# Patient Record
Sex: FEMALE | Race: WHITE | Employment: OTHER | URBAN - METROPOLITAN AREA
[De-identification: names, ages, dates, MRNs, and addresses within clinical notes are randomized per-mention and may not be internally consistent; named-entity substitution may affect disease eponyms.]

---

## 2017-05-26 ENCOUNTER — OFFICE VISIT (OUTPATIENT)
Dept: FAMILY MEDICINE | Facility: CLINIC | Age: 27
End: 2017-05-26
Payer: COMMERCIAL

## 2017-05-26 VITALS
HEIGHT: 63 IN | RESPIRATION RATE: 16 BRPM | WEIGHT: 142.25 LBS | OXYGEN SATURATION: 100 % | TEMPERATURE: 98.5 F | SYSTOLIC BLOOD PRESSURE: 119 MMHG | DIASTOLIC BLOOD PRESSURE: 82 MMHG | HEART RATE: 84 BPM | BODY MASS INDEX: 25.2 KG/M2

## 2017-05-26 DIAGNOSIS — H66.001 ACUTE SUPPURATIVE OTITIS MEDIA OF RIGHT EAR WITHOUT SPONTANEOUS RUPTURE OF TYMPANIC MEMBRANE, RECURRENCE NOT SPECIFIED: ICD-10-CM

## 2017-05-26 DIAGNOSIS — Z23 NEED FOR TDAP VACCINATION: ICD-10-CM

## 2017-05-26 DIAGNOSIS — Z00.00 ROUTINE GENERAL MEDICAL EXAMINATION AT A HEALTH CARE FACILITY: ICD-10-CM

## 2017-05-26 DIAGNOSIS — Z13.0 SCREENING, ANEMIA, DEFICIENCY, IRON: Primary | ICD-10-CM

## 2017-05-26 DIAGNOSIS — Z13.1 SCREENING FOR DIABETES MELLITUS: ICD-10-CM

## 2017-05-26 DIAGNOSIS — Z13.6 ENCOUNTER FOR SCREENING FOR CARDIOVASCULAR DISORDERS: ICD-10-CM

## 2017-05-26 DIAGNOSIS — Z71.85 HPV VACCINE COUNSELING: ICD-10-CM

## 2017-05-26 PROCEDURE — 90715 TDAP VACCINE 7 YRS/> IM: CPT | Performed by: FAMILY MEDICINE

## 2017-05-26 PROCEDURE — 99385 PREV VISIT NEW AGE 18-39: CPT | Mod: 25 | Performed by: FAMILY MEDICINE

## 2017-05-26 PROCEDURE — 90471 IMMUNIZATION ADMIN: CPT | Performed by: FAMILY MEDICINE

## 2017-05-26 PROCEDURE — 99212 OFFICE O/P EST SF 10 MIN: CPT | Mod: 25 | Performed by: FAMILY MEDICINE

## 2017-05-26 RX ORDER — AMOXICILLIN 875 MG
875 TABLET ORAL 2 TIMES DAILY
Qty: 20 TABLET | Refills: 0 | Status: SHIPPED | OUTPATIENT
Start: 2017-05-26 | End: 2017-12-05

## 2017-05-26 NOTE — NURSING NOTE
Screening Questionnaire for Adult Immunization    Are you sick today?   No   Do you have allergies to medications, food, a vaccine component or latex?   No   Have you ever had a serious reaction after receiving a vaccination?   No   Do you have a long-term health problem with heart disease, lung disease, asthma, kidney disease, metabolic disease (e.g. diabetes), anemia, or other blood disorder?   No   Do you have cancer, leukemia, HIV/AIDS, or any other immune system problem?   No   In the past 3 months, have you taken medications that affect  your immune system, such as prednisone, other steroids, or anticancer drugs; drugs for the treatment of rheumatoid arthritis, Crohn s disease, or psoriasis; or have you had radiation treatments?   No   Have you had a seizure, or a brain or other nervous system problem?   No   During the past year, have you received a transfusion of blood or blood     products, or been given immune (gamma) globulin or antiviral drug?   No   For women: Are you pregnant or is there a chance you could become        pregnant during the next month?   No   Have you received any vaccinations in the past 4 weeks?   No     Immunization questionnaire answers were all negative.      MNVFC doesn't apply on this patient    Per orders of Dr. Alvin MD, injection of Tdap (Adacel) given by Fátima Carey. Patient instructed to remain in clinic for 20 minutes afterwards, and to report any adverse reaction to me immediately.       Screening performed by Fátima Carey on 5/26/2017 at 12:00 PM.

## 2017-05-26 NOTE — NURSING NOTE
"Chief Complaint   Patient presents with     Physical     Initial /82 (BP Location: Left arm, Patient Position: Chair)  Pulse 84  Temp 98.5  F (36.9  C) (Oral)  Resp 16  Ht 5' 3\" (1.6 m)  Wt 142 lb 4 oz (64.5 kg)  LMP 05/17/2017 (Exact Date)  SpO2 100%  BMI 25.2 kg/m2 Estimated body mass index is 25.2 kg/(m^2) as calculated from the following:    Height as of this encounter: 5' 3\" (1.6 m).    Weight as of this encounter: 142 lb 4 oz (64.5 kg)..  BP completed using cuff size: rachel Alcazar MA   "

## 2017-05-26 NOTE — Clinical Note
Pap smear done on this date: 03/2015 (approximately), by this group: [place], [place]  results were normal.

## 2017-05-26 NOTE — PROGRESS NOTES
SUBJECTIVE:     CC: Ofelia Will is an 27 year old woman who presents for preventive health visit.     Healthy Habits:  Answers for HPI/ROS submitted by the patient on 5/26/2017   Annual Exam:  Getting at least 3 servings of Calcium per day:: NO  Bi-annual eye exam:: NO  Dental care twice a year:: NO  Sleep apnea or symptoms of sleep apnea:: Daytime drowsiness  Diet:: Regular (no restrictions)  Frequency of exercise:: None  Taking medications regularly:: Yes  Medication side effects:: None  Additional concerns today:: YES  PHQ-2 Score: 0    Moved from [place] in 2015 due to husbands job. 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   Mother had breast caner . Diagnosed age 51 . No genetic testing known     No personal breast issues    Brought in shot record, up to date with HPV, needs tdap , per records not sure if has had Hep A , will await official records    Not sure of coverage will check with her insurance before doing fasting lipids, CMP, cbc    CONCERN  Complain of sore throat 1 week , right ear clogged and painful. No fever or chills, no double or blurry vision, no facial pain, runny nose and sneezing positive. 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     Today's PHQ-2 Score:   PHQ-2 (  [employer]) 2017   Q1: Little interest or pleasure in doing things 0   Q2: Feeling down, depressed or hopeless 0   PHQ-2 Score 0   Q1: Little interest or pleasure in doing things Not at all   Q2: Feeling down, depressed or hopeless Not at all   PHQ-2 Score 0       Abuse: Current or Past(Physical, Sexual or Emotional)- No  Do you feel safe in your environment - Yes    Social History   Substance Use Topics     Smoking status: Not on file     Smokeless tobacco: Not on file     Alcohol use Not on file     The patient does not drink >3 drinks per day nor >7 drinks per week.    No results for input(S): CHOL, HDL, LDL, TRIG, CHOLHDLRATIO, NHDL in the last 18372 hours.    Reviewed orders with patient.  Reviewed health maintenance and updated orders accordingly - Yes    Mammo Decision Support:  Mammogram not appropriate for this patient based on age.    Pertinent mammograms are reviewed under the imaging tab.  History of abnormal Pap smear: NO - age 30- 65 PAP every 3 years recommended  Last 3 Pap Results: No results found for: PAP    Reviewed and updated as needed this visit by clinical staff         Reviewed and updated as needed this visit by Provider        Past Medical History:   Diagnosis Date     ASD (atrial septal defect)     s/p surgical closer age 11 to 13 , asymptomatic       Past Surgical History:   Procedure Laterality Date     REPAIR ATRIAL SEPTAL DEFECT      at age 11 to 13      Obstetric History       T0      L0     SAB0   TAB0   Ectopic0   Multiple0   Live Births0           ROS:  C: NEGATIVE for fever, chills, change in weight  I: NEGATIVE for worrisome rashes, moles or lesions  E: NEGATIVE for vision changes or irritation  ENT: as in HPI   R: NEGATIVE for significant cough or SOB  B: NEGATIVE for masses, tenderness or discharge  CV: NEGATIVE for chest pain, palpitations or peripheral edema  GI: NEGATIVE for nausea, abdominal  pain, heartburn, or change in bowel habits  : NEGATIVE for unusual urinary or vaginal symptoms. Periods are regular.  M: NEGATIVE for significant arthralgias or myalgia  N: NEGATIVE for weakness, dizziness or paresthesias  E: NEGATIVE for temperature intolerance, skin/hair changes  H: NEGATIVE for bleeding problems  P: NEGATIVE for changes in mood or affect    Problem list, Medication list, Allergies, and Medical/Social/Surgical histories reviewed in [facility] and updated as appropriate.  Labs reviewed in EPIC  BP Readings from Last 3 Encounters:   05/26/17 119/82    Wt Readings from Last 3 Encounters:   05/26/17 142 lb 4 oz (64.5 kg)                  There is no problem list on file for this patient.    Past Surgical History:   Procedure Laterality Date     REPAIR ATRIAL SEPTAL DEFECT      at age 11 to 13        Social History   Substance Use Topics     Smoking status: Never Smoker     Smokeless tobacco: Never Used     Alcohol use Yes      Comment: 3 beverages on a average week      Family History   Problem Relation Age of Onset     Breast Cancer Mother      CEREBROVASCULAR DISEASE Father      loss of viison one eye      Myocardial Infarction Maternal Grandfather      Myocardial Infarction Paternal Grandfather          Current Outpatient Prescriptions   Medication Sig Dispense Refill     amoxicillin (AMOXIL) 875 MG tablet Take 1 tablet (875 mg) by mouth 2 times daily 20 tablet 0     No Known Allergies  No lab results found.   OBJECTIVE:     There were no vitals taken for this visit.  EXAM:  GENERAL: healthy, alert and no distress  EYES: Eyes grossly normal to inspection, PERRL and conjunctivae and sclerae normal  HENT: normal cephalic/atraumatic, right ear: erythematous, left ear: clear effusion, nose and mouth without ulcers or lesions, oropharynx clear other than clear post nasal drainage, oral mucous membranes moist and sinuses: not tender  NECK: no adenopathy, no asymmetry, masses, or scars and thyroid normal to  palpation  RESP: lungs clear to auscultation - no rales, rhonchi or wheezes  BREAST: normal without masses, tenderness or nipple discharge and no palpable axillary masses or adenopathy  CV: regular rate and rhythm, normal S1 S2, no S3 or S4, no murmur, click or rub, no peripheral edema and peripheral pulses strong  ABDOMEN: soft, non tender, no hepatosplenomegaly, no masses and bowel sounds normal  MS: no gross musculoskeletal defects noted, no edema  SKIN: no suspicious lesions or rashes  NEURO: Normal strength and tone, mentation intact and speech normal  PSYCH: mentation appears normal, affect normal/bright  LYMPH: no cervical or supraclavicular adenopathy    ASSESSMENT/PLAN:     1. Routine general medical examination at a health care facility  New to me and no records to review prior to apt. apt time limited, here for physical and establish care. [place]. Will Sign a release of information so we can get records. Breast exam done benign. Breast exam monthly. mammogram starting age 40 given family history. To ask mom about if had genetic testing. Amoxil twice a day for ear infection for 10 days. Take with food. Use a probiotic / yoghurt while on the med. Pap not due till 2018. Tdap today. HPV utd . Consider hep a in the future. May try Flonase 1 spray each nose daily 1 to 2 times a day along with antihistamine OTC daily 2 weeks to help decongest sinus and help with eustachian tube dysfunction. Can start this prior to flights to see if will help along wit decongestant if needed and chewing on gum on ascent and descent to prevent eustachian tube dysfunction. If planning to get pregnant start pnv 3 months prior to plan to get [pregnant , no history of congenital or neural tube defects. Stop alcohol few weeks before trying to get pregnant. Fasting labs once checks with her insurance.   - CBC with platelets differential; Future  - Comprehensive metabolic panel; Future  - Lipid panel  reflex to direct LDL; Future  - VACCINE ADMINISTRATION, INITIAL  - TDAP VACCINE (ADACEL)    2. Screening, anemia, deficiency, iron  - CBC with platelets differential; Future    3. Screening for diabetes mellitus  - Comprehensive metabolic panel; Future    4. Encounter for screening for cardiovascular disorders  - Lipid panel reflex to direct LDL; Future    5. Need for Tdap vaccination  - VACCINE ADMINISTRATION, INITIAL  - TDAP VACCINE (ADACEL)    6. HPV vaccine counseling  UTD in [place]     7. Acute suppurative otitis media of right ear without spontaneous rupture of tympanic membrane, recurrence not specified  - amoxicillin (AMOXIL) 875 MG tablet; Take 1 tablet (875 mg) by mouth 2 times daily  Dispense: 20 tablet; Refill: 0  - OFFICE/OUTPT VISIT,EST,LEVL II    COUNSELING:   Reviewed preventive health counseling, as reflected in patient instructions       Regular exercise       Healthy diet/nutrition       Immunizations    Vaccinated for: TDAP           Contraception       Family planning       Safe sex practices/STD prevention       Colon cancer screening       Consider Hep C screening for patients born between 1945 and 1965       HIV screeninx in teen years, 1x in adult years, and at intervals if high risk         has no tobacco history on file.    There is no height or weight on file to calculate BMI.       Counseling Resources:  ATP IV Guidelines  Pooled Cohorts Equation Calculator  Breast Cancer Risk Calculator  FRAX Risk Assessment  ICSI Preventive Guidelines  Dietary Guidelines for Americans,   [employer] My Plate  ASA Prophylaxis  Lung CA Screening    Guerda Esquivel MD  [facility]

## 2017-05-26 NOTE — MR AVS SNAPSHOT
After Visit Summary   5/26/2017    Ofelia Will    MRN: [id number]           Patient Information     Date Of Birth          1990        Visit Information        Provider Department      5/26/2017 11:00 AM Guerda Esquivel MD [facility]        Today's Diagnoses     Screening, anemia, deficiency, iron    -  1    Routine general medical examination at a health care facility        Screening for diabetes mellitus        Encounter for screening for cardiovascular disorders        Need for Tdap vaccination        HPV vaccine counseling        Acute suppurative otitis media of right ear without spontaneous rupture of tympanic membrane, recurrence not specified          Care Instructions    Breast exam monthly   mammogram starting age 40 given family history   Ask mom about if had genetic testing   Amoxil twice a day for ear infection for 10 days  Take with food   Use a probiotic / yoghurt while on the med  Pap not due till 2018   Will await records if sign release of information  Tdap today   HPv utd     Preventive Health Recommendations  Female Ages 26 - 39  Yearly exam:   See your health care provider every year in order to    Review health changes.     Discuss preventive care.      Review your medicines if you your doctor has prescribed any.    Until age 30: Get a Pap test every three years (more often if you have had an abnormal result).    After age 30: Talk to your doctor about whether you should have a Pap test every 3 years or have a Pap test with HPV screening every 5 years.   You do not need a Pap test if your uterus was removed (hysterectomy) and you have not had cancer.  You should be tested each year for STDs (sexually transmitted diseases), if you're at risk.   Talk to your provider about how often to have your cholesterol checked.  If you are at risk for diabetes, you should have a diabetes test (fasting glucose).  Shots: Get a flu shot each year. Get a tetanus shot every 10  years.   Nutrition:     Eat at least 5 servings of fruits and vegetables each day.    Eat whole-grain bread, whole-wheat pasta and brown rice instead of white grains and rice.    Talk to your provider about Calcium and Vitamin D.     Lifestyle    Exercise at least 150 minutes a week (30 minutes a day, 5 days of the week). This will help you control your weight and prevent disease.    Limit alcohol to one drink per day.    No smoking.     Wear sunscreen to prevent skin cancer.    See your dentist every six months for an exam and cleaning.            Follow-ups after your visit        Future tests that were ordered for you today     Open Future Orders        Priority Expected Expires Ordered    CBC with platelets differential Routine 6/9/2017 8/26/2017 5/26/2017    Comprehensive metabolic panel Routine 6/9/2017 8/26/2017 5/26/2017    Lipid panel reflex to direct LDL Routine 6/9/2017 8/26/2017 5/26/2017            Who to contact     If you have questions or need follow up information about today's clinic visit or your schedule please contact [facility] directly at [phone].  Normal or non-critical lab and imaging results will be communicated to you by [employer], letter or phone within 4 business days after the clinic has received the results. If you do not hear from us within 7 days, please contact the clinic through [employer] or phone. If you have a critical or abnormal lab result, we will notify you by phone as soon as possible.  Submit refill requests through [employer] or call your pharmacy and they will forward the refill request to us. Please allow 3 business days for your refill to be completed.          Additional Information About Your Visit        [employer]ate Information     [employer] gives you secure access to your electronic health record. If you see a primary care provider, you can also send messages to your care team and make appointments. If you have questions, please call your primary care clinic.  " If you do not have a primary care provider, please call [phone] and they will assist you.        Care EveryWhere ID     This is your Care EveryWhere ID. This could be used by other organizations to access your [facility] medical records  [id number]        Your Vitals Were     Pulse Temperature Respirations Height Last Period Pulse Oximetry    84 98.5  F (36.9  C) (Oral) 16 5' 3\" (1.6 m) 05/17/2017 (Exact Date) 100%    BMI (Body Mass Index)                   25.2 kg/m2            Blood Pressure from Last 3 Encounters:   05/26/17 119/82    Weight from Last 3 Encounters:   05/26/17 142 lb 4 oz (64.5 kg)              We Performed the Following     TDAP VACCINE (ADACEL)     VACCINE ADMINISTRATION, INITIAL          Today's Medication Changes          These changes are accurate as of: 5/26/17 11:47 AM.  If you have any questions, ask your nurse or doctor.               Start taking these medicines.        Dose/Directions    amoxicillin 875 MG tablet   Commonly known as:  AMOXIL   Used for:  Acute suppurative otitis media of right ear without spontaneous rupture of tympanic membrane, recurrence not specified   Started by:  Guerda Esquivel MD        Dose:  875 mg   Take 1 tablet (875 mg) by mouth 2 times daily   Quantity:  20 tablet   Refills:  0            Where to get your medicines      These medications were sent to [facility] [place], [place] - [address]  [address] [facility] [facility] [address]     Phone:  [phone]     amoxicillin 875 MG tablet                Primary Care Provider    None Specified       No primary provider on file.        Thank you!     Thank you for choosing [facility]  for your care. Our goal is always to provide you with excellent care. Hearing back from our patients is one way we can continue to improve our services. Please take a few minutes to complete the written survey that you may receive in the mail after your visit with us. Thank you!           "   Your Updated Medication List - Protect others around you: Learn how to safely use, store and throw away your medicines at www.disposemymeds.org.          This list is accurate as of: 5/26/17 11:47 AM.  Always use your most recent med list.                   Brand Name Dispense Instructions for use    amoxicillin 875 MG tablet    AMOXIL    20 tablet    Take 1 tablet (875 mg) by mouth 2 times daily

## 2017-12-05 ENCOUNTER — OFFICE VISIT (OUTPATIENT)
Dept: FAMILY MEDICINE | Facility: CLINIC | Age: 27
End: 2017-12-05
Payer: COMMERCIAL

## 2017-12-05 VITALS
SYSTOLIC BLOOD PRESSURE: 128 MMHG | TEMPERATURE: 98.7 F | HEART RATE: 101 BPM | BODY MASS INDEX: 25.51 KG/M2 | OXYGEN SATURATION: 98 % | RESPIRATION RATE: 18 BRPM | WEIGHT: 144 LBS | DIASTOLIC BLOOD PRESSURE: 81 MMHG

## 2017-12-05 DIAGNOSIS — Z86.59 HISTORY OF DEPRESSION: ICD-10-CM

## 2017-12-05 DIAGNOSIS — R53.83 OTHER FATIGUE: ICD-10-CM

## 2017-12-05 DIAGNOSIS — Z86.2 HISTORY OF ANEMIA: ICD-10-CM

## 2017-12-05 DIAGNOSIS — R40.0 DAYTIME SLEEPINESS: ICD-10-CM

## 2017-12-05 DIAGNOSIS — G47.00 INSOMNIA, UNSPECIFIED TYPE: Primary | ICD-10-CM

## 2017-12-05 DIAGNOSIS — Z23 NEED FOR HEPATITIS A IMMUNIZATION: ICD-10-CM

## 2017-12-05 DIAGNOSIS — Z23 NEED FOR PROPHYLACTIC VACCINATION AND INOCULATION AGAINST INFLUENZA: ICD-10-CM

## 2017-12-05 DIAGNOSIS — F43.9 STRESS: ICD-10-CM

## 2017-12-05 DIAGNOSIS — Z86.39 HISTORY OF VITAMIN D DEFICIENCY: ICD-10-CM

## 2017-12-05 DIAGNOSIS — Z13.6 ENCOUNTER FOR SCREENING FOR CARDIOVASCULAR DISORDERS: ICD-10-CM

## 2017-12-05 LAB
BASOPHILS # BLD AUTO: 0 10E9/L (ref 0–0.2)
BASOPHILS NFR BLD AUTO: 0.5 %
DIFFERENTIAL METHOD BLD: NORMAL
EOSINOPHIL # BLD AUTO: 0.1 10E9/L (ref 0–0.7)
EOSINOPHIL NFR BLD AUTO: 0.9 %
ERYTHROCYTE [DISTWIDTH] IN BLOOD BY AUTOMATED COUNT: 11.9 % (ref 10–15)
HCT VFR BLD AUTO: 42.4 % (ref 35–47)
HGB BLD-MCNC: 14.7 G/DL (ref 11.7–15.7)
LYMPHOCYTES # BLD AUTO: 2.2 10E9/L (ref 0.8–5.3)
LYMPHOCYTES NFR BLD AUTO: 27.8 %
MCH RBC QN AUTO: 30.5 PG (ref 26.5–33)
MCHC RBC AUTO-ENTMCNC: 34.7 G/DL (ref 31.5–36.5)
MCV RBC AUTO: 88 FL (ref 78–100)
MONOCYTES # BLD AUTO: 0.5 10E9/L (ref 0–1.3)
MONOCYTES NFR BLD AUTO: 6.3 %
NEUTROPHILS # BLD AUTO: 5.2 10E9/L (ref 1.6–8.3)
NEUTROPHILS NFR BLD AUTO: 64.5 %
PLATELET # BLD AUTO: 316 10E9/L (ref 150–450)
RBC # BLD AUTO: 4.82 10E12/L (ref 3.8–5.2)
WBC # BLD AUTO: 8 10E9/L (ref 4–11)

## 2017-12-05 PROCEDURE — 99214 OFFICE O/P EST MOD 30 MIN: CPT | Mod: 25 | Performed by: FAMILY MEDICINE

## 2017-12-05 PROCEDURE — 36415 COLL VENOUS BLD VENIPUNCTURE: CPT | Performed by: FAMILY MEDICINE

## 2017-12-05 PROCEDURE — 80061 LIPID PANEL: CPT | Performed by: FAMILY MEDICINE

## 2017-12-05 PROCEDURE — 82306 VITAMIN D 25 HYDROXY: CPT | Performed by: FAMILY MEDICINE

## 2017-12-05 PROCEDURE — 90686 IIV4 VACC NO PRSV 0.5 ML IM: CPT | Performed by: FAMILY MEDICINE

## 2017-12-05 PROCEDURE — 90471 IMMUNIZATION ADMIN: CPT | Performed by: FAMILY MEDICINE

## 2017-12-05 PROCEDURE — 80050 GENERAL HEALTH PANEL: CPT | Performed by: FAMILY MEDICINE

## 2017-12-05 RX ORDER — TRAZODONE HYDROCHLORIDE 50 MG/1
50 TABLET, FILM COATED ORAL
Qty: 30 TABLET | Refills: 1 | Status: CANCELLED | OUTPATIENT
Start: 2017-12-05

## 2017-12-05 RX ORDER — LANOLIN ALCOHOL/MO/W.PET/CERES
3 CREAM (GRAM) TOPICAL
Qty: 30 TABLET | Refills: 0 | Status: SHIPPED | OUTPATIENT
Start: 2017-12-05 | End: 2018-03-01

## 2017-12-05 ASSESSMENT — ANXIETY QUESTIONNAIRES
7. FEELING AFRAID AS IF SOMETHING AWFUL MIGHT HAPPEN: NOT AT ALL
5. BEING SO RESTLESS THAT IT IS HARD TO SIT STILL: NOT AT ALL
GAD7 TOTAL SCORE: 4
2. NOT BEING ABLE TO STOP OR CONTROL WORRYING: SEVERAL DAYS
6. BECOMING EASILY ANNOYED OR IRRITABLE: NOT AT ALL
1. FEELING NERVOUS, ANXIOUS, OR ON EDGE: SEVERAL DAYS
IF YOU CHECKED OFF ANY PROBLEMS ON THIS QUESTIONNAIRE, HOW DIFFICULT HAVE THESE PROBLEMS MADE IT FOR YOU TO DO YOUR WORK, TAKE CARE OF THINGS AT HOME, OR GET ALONG WITH OTHER PEOPLE: SOMEWHAT DIFFICULT
3. WORRYING TOO MUCH ABOUT DIFFERENT THINGS: SEVERAL DAYS

## 2017-12-05 ASSESSMENT — PATIENT HEALTH QUESTIONNAIRE - PHQ9
SUM OF ALL RESPONSES TO PHQ QUESTIONS 1-9: 4
5. POOR APPETITE OR OVEREATING: SEVERAL DAYS

## 2017-12-05 NOTE — PROGRESS NOTES
Ilana Ms. Will,  Your results came back and are within acceptable limits. -Normal red blood cell (hgb) levels, normal white blood cell count and normal platelet levels.. If you have any further concerns please do not hesitate to contact us by message, phone or making an appointment.  Have a good day   Dr Alvin HOLDEN

## 2017-12-05 NOTE — MR AVS SNAPSHOT
After Visit Summary   12/5/2017    Ofelia Will    MRN: [id number]           Patient Information     Date Of Birth          1990        Visit Information        Provider Department      12/5/2017 1:20 PM Guerda Esquivel MD [facility]        Today's Diagnoses     Insomnia, unspecified type    -  1    Other fatigue        Stress        Daytime sleepiness        History of depression        History of anemia        History of vitamin D deficiency        Need for prophylactic vaccination and inoculation against influenza        Need for hepatitis A immunization        Encounter for screening for cardiovascular disorders          Care Instructions    Labs today   Try melatonin 3 mg bedtimes, start with 1/2 tab 1 hour prior to sleep   If that patel snot work call about trazodone  If no help see sleep specialist  Stress can do this too so consider counseling   Flu shot today   Pap due 2018  mammogram age 41   Consider hep a shots     General recommendations for sleep problems (Insomnia)  Allow 2-4 weeks to see results     Establish a regular sleep schedule   Go to bed at same time each night, get up at same time each day, avoid sleeping-in on weekends.  Cut down time in bed (if not asleep, get up, go in other room, do something calming and boring such as sorting papers, making warm milk, knitting, dusting)   Avoid trying to force yourself to sleep.  Use your bed only for sleep. Do not read or watch Television in bed.     Make the bedroom comfortable  Keepthe temperature in your bedroom comfortable, keep bedroom quiet when sleeping, and consider ear plugs (silicon) especially if you have partner who snores.  Keep bedroom dark enough:  use dark blinds or wear an eye mask if needed.    Relax at bedtime.    Do not watch tv or play video games or surf on computer right before bed; the full spectrum light actually stimulates your brain!  Relax each muscle group individually ; begin with your  feet, work toward your head. Deal with your worries before bedtime by setting aside a worry time for 30 minutes earlier or journal your thoughts.  Listen to relaxation tapes such as Classical Music or Nature sounds or imagine a tranquil scene (e.g. waterfall or beach)   Back Massage : Gentle 5-minute back rub prior to bedtime can help relax you.    Perform measures to make you tired at bedtime.   Get regular Exercise each day (at least 6 hours before bedtime)   Take medications only as directed   Eat a light bedtime snack or warm drink, such as milk or herbal tea (non-caffeinated)   No Napping during day     Stimulus Control   Do not lie awake for more than 30 minutes.   Get out of bed and perform a quiet activity, then return to bed when sleepy.  Repeat as many times per night as needed     Things to avoid   Do not Exercise just before bedtime   No overstimulating activities just before bed, such as competitive games or exciting Television programs  Avoid caffeine (coffee, tea, soda), chocolate after lunchtime   Do not use alcohol to induce sleep (worsens Insomnia)   Do not take someone else's sleeping pills   Do not look at the clock when awakening   Do not turn on light when getting up to use bathroom     Instructions for treating Delayed Sleep Phase Syndrome:    Delayed Sleep Phase Syndrome (DSPS) means that your body's internal timing is set late compared to the 24 hour day. Therefore, it is often difficult to get up on time for work in the morning and sometimes difficult to fall asleep on time, in order to get enough sleep. People with DSPS often tend to like to stay up late on weekends and sleep in until between 10 AM and noon, sometimes even later.This is actually a bad habit that will perpetuate the problem. It reinforces your body's tendency to be on that later schedule.    You should go to bed when you are sleepy and ready to sleep. During this entire process, you should not engage in activities that may  make it worse, such as watching TV in bed, leaving the TV on all night, drinking any caffeine 6 hours before bed or exercising 1-2 hours before bed.     Start taking Melatonin, 1 mg tablet 3-5 hours before the time that you fall asleep on average (not your desired bedtime or time that you get in bed, but the time you normally fall asleep on your own).      Upon awakening, get exposure to sun-light for about 30-45 minutes. You do not need to look at the sun, in fact, this is dangerous. Reading the paper with the sun shining on you is adequate.  Alternatively, you may use a Seasonal Affective Disorder Lamp (intensity 10,000 Lux) instead of the sun. The lamp should be positioned 1-2 arms lengths away from you. They lamps are sold at [employer] such as [employer] or [employer]. A prescription can be written to get insurance coverage in some cases. They are also sold on [employer].com.    Using the light and melatonin should help march your internal clock (known as your circadian rhythms) gradually earlier. As your bedtime advances, remember to take your melatonin earlier, keeping it 5 hours before your fall asleep time.    Avoid naps and sleeping in because sleeping during the day will delay your body's clock and you will have to start from scratch.     More information about light therapy:  If the cost of any light box is too much, you can also purchase a compact fluorescent all spectrum light bulb at a local hardware store for about $8.  The most commonly available bulb is 1400 lumen.  You would need two of these positioned within 1 meter of yourself to be equivalent to 2,500 lux.  The bulbs can be placed in a standard light fixture.  Additionally, they can be placed in a mountable fixture that is used in greenhouses.  Mountable fixtures are also available at hardware stores for about $9.  Do not look directly at the light.  If you have any concerns regarding the safety of bright light therapy  for you, it is recommended that you consult an ophthalmologist before using a light box.  If you have a condition that makes your eyes very sensitive to light, macular degeneration, a family history of such problems, or diabetic changes to your eyes, consult an ophthalmologist before using a light box. If you have anxiety disorder and have an increase in anxiety discontinue use.  Make a relaxing routine prior to bedtime  Relaxation exercises:              Progressive muscle relaxation: Relax each muscle group individually                Begin with your feet, flex, then relax. Try to imagine your feet feeling heavy and sinking into the bed. Move to your calves, do the same thing. Work through each muscle group toward your head.              Relaxing Mental Imagery: Try to imagine a trip that you took and found relaxing, or imagine a day at the beach. Try to walk yourself through the day in your mind as if you were dreaming it. Try to imagine sensing the different experiences, such as feeling sand between your toes, the heat of the sun on your skin, seeing the waves crashing the shore, the smell of the salt water, etc.     Deal with your worries before bedtime                Set aside a worry time around dinner time for 10-15 minutes. Write down the                things that are on your mind. Plan time in the coming days to address those                issues. Brainstorm ideas on how you will deal with them. Try to identify issues                that are out of your control, and try to let those issues go.  Listen to relaxation tapes    Classical Music or Nature sounds    Back Massage    Get regular exercise each day (at least 1-2 hours before bedtime)    Take medications only as directed    Eat a light bedtime snack or warm drink    Warm milk    Warm herbal tea (non-caffeinated)       Things to avoid    No overstimulating activities just before bed    No competitive games before bedtime    No exciting television  programs before bedtime    Avoid caffeine after lunchtime    Avoid chocolate    Do not use alcohol to induce sleep (worsens Insomnia)    Do not take someone else's sleeping pills    Do not look at the clock when awakening    Do not turn on light when getting up to use bathroom, use a nightlight     Online Programs     www.SHUTi.me (pronounced shut eye). There is a fee for this program. Enter the code  [place]  if you decide to enroll in this program.        www.sleepIO.com (pronounced sleep ee oh). There is a fee for this program. Enter the code  [place]  if you decide to enroll in this program.    Suggested Resources  Insomnia Treatment Books      Overcoming Insomnia by Jean Marie Garay and Wendie Oliver (2008)    No More Sleepless Nights by Oziel Ortiz and Ching Zaragoza (1996)    Say Debbie to Insomnia by David Padgett (2009)    The Insomnia Workbook by Adri Lima and Harish Davis (2009)    The Insomnia Answer by Alvin Stephenson and German Santana (2006)      Stress Management and Relaxation Books    The Relaxation and Stress Reduction Workbook by Desirae Mendez, Nery Angeles and Jeremiah Feldman (2008)    Stress Management Workbook: Techniques and Self-Assessment Procedures by Kat Boo and Desmond Palencia (1997)    A Mindfulness-Based Stress Reduction Workbook by Andreas Asher and Charlene Ortiz (2010)    The Complete Stress Management Workbook by Cas Kelly and Aric Harvey (1996)    Assert Yourself by Denice Dawson and Benjamin Dawson (1977)    Relaxation Resources for Computer Download   These websites offer resources to help you relax. This list is for information only. [facility] is not responsible for the quality of services or the actions of any person or organization.  Progressive Muscle Relaxation (PMR):       http://www.[employer]/progressive-muscle-relaxation-exercise.html    http://studentsupport.[facility]/counseling/resources/self-help/relaxation-and-stress-management/  Deep Breathing Exercises:    http://www.[employer]/breathing-awareness.html    Meditation:         wwwMedgenome Labs    www.[employer] InnovationsguidedUSEUMmeditationUSEUMsite.[employer] You may have to pay for some of these resources.    Guided Imagery:    http://www.[employer]/guided-imagery-scripts.html      http://[employer]/library/kedoblgpnu-cxfddo-ayzwhsf/    Counseling / Behavioral Health  [facility]  Visit [email or web address] or call [phone] to find a clinic close to you.  Or call [phone] for [facility].              Follow-ups after your visit        Additional Services     MENTAL HEALTH REFERRAL  - Adult; Assessments and Testing; General Psychological Testing; [facility]: [facility] [phone]; We will contact you to schedule the appointment or please call with any questions       All scheduling is subject to the client's specific insurance plan & benefits, provider/location availability, and provider clinical specialities.  Please arrive 15 minutes early for your first appointment and bring your completed paperwork. THERAPY     Please be aware that coverage of these services is subject to the terms and limitations of your health insurance plan.  Call member services at your health plan with any benefit or coverage questions.            SLEEP EVALUATION & MANAGEMENT REFERRAL - ADULT -[facility] [phone]  (Age 18 and up)       Please be aware that coverage of these services is subject to the terms and limitations of your health insurance plan.  Call member services at your health plan with any benefit or coverage questions.      Please bring the following to your appointment:    >>   List of current medications   >>    This referral request   >>   Any documents/labs given to you for this referral                      Future tests that were ordered for you today     Open Future Orders        Priority Expected Expires Ordered    SLEEP EVALUATION & MANAGEMENT REFERRAL - ADULT -[facility] - [place] [phone]  (Age 18 and up) Routine  12/5/2018 12/5/2017            Who to contact     If you have questions or need follow up information about today's clinic visit or your schedule please contact [facility] directly at [phone].  Normal or non-critical lab and imaging results will be communicated to you by Drive YOYOhart, letter or phone within 4 business days after the clinic has received the results. If you do not hear from us within 7 days, please contact the clinic through [employer] or phone. If you have a critical or abnormal lab result, we will notify you by phone as soon as possible.  Submit refill requests through [employer]-Squared or call your pharmacy and they will forward the refill request to us. Please allow 3 business days for your refill to be completed.          Additional Information About Your Visit        Drive YOYOhart Information     [employer]ared gives you secure access to your electronic health record. If you see a primary care provider, you can also send messages to your care team and make appointments. If you have questions, please call your primary care clinic.  If you do not have a primary care provider, please call [phone] and they will assist you.        Care EveryWhere ID     This is your Care EveryWhere ID. This could be used by other organizations to access your [place] medical records  [id number]        Your Vitals Were     Pulse Temperature Respirations Pulse Oximetry BMI (Body Mass Index)       101 98.7  F (37.1  C) (Oral) 18 98% 25.51 kg/m2        Blood Pressure from Last 3 Encounters:   12/05/17 128/81   05/26/17 119/82    Weight from Last 3 Encounters:   12/05/17 144 lb (65.3 kg)   05/26/17  142 lb 4 oz (64.5 kg)              We Performed the Following     CBC with platelets differential     Comprehensive metabolic panel     HC FLU VAC PRESRV FREE QUAD SPLIT VIR 3+YRS IM     Lipid panel reflex to direct LDL Fasting     MENTAL HEALTH REFERRAL  - Adult; Assessments and Testing; General Psychological Testing; [facility] [phone]; We will contact you to schedule the appointment or please call with any questions     TSH     VACCINE ADMINISTRATION, INITIAL     Vitamin D deficiency screening          Today's Medication Changes          These changes are accurate as of: 12/5/17  2:05 PM.  If you have any questions, ask your nurse or doctor.               Start taking these medicines.        Dose/Directions    melatonin 3 MG tablet   Used for:  Insomnia, unspecified type   Started by:  Guerda Esquivel MD        Dose:  3 mg   Take 1 tablet (3 mg) by mouth nightly as needed for sleep   Quantity:  30 tablet   Refills:  0            Where to get your medicines      [facility] [address] [place] [address] AT [address] [address]     Phone:  [phone]     melatonin 3 MG tablet                Primary Care Provider Office Phone # Fax #    Guerda Esquivel [phone] [address] [facility] [address]        Equal Access to Services     MAHIN STEARNS AH: Frida vasqueso Sokg, waaxda luqadaha, qaybta kaalmada adeegyada, viki gonzalez. So [facility] [phone].    ATENCIÓN: Si habla español, tiene a ferris disposición servicios gratuitos de asistencia lingüística. Llame al [phone].    We comply with applicable federal civil rights laws and [place] laws. We do not discriminate on the basis of race, color, national origin, age, disability, sex, sexual orientation, or gender identity.            Thank you!     Thank you for choosing  [facility]  for your care. Our goal is always to provide you with excellent care. Hearing back from our patients is one way we can continue to improve our services. Please take a few minutes to complete the written survey that you may receive in the mail after your visit with us. Thank you!             Your Updated Medication List - Protect others around you: Learn how to safely use, store and throw away your medicines at www.disposemymeds.org.          This list is accurate as of: 12/5/17  2:05 PM.  Always use your most recent med list.                   Brand Name Dispense Instructions for use Diagnosis    melatonin 3 MG tablet     30 tablet    Take 1 tablet (3 mg) by mouth nightly as needed for sleep    Insomnia, unspecified type

## 2017-12-05 NOTE — NURSING NOTE
Ofelia Will      1.  Has the patient received the information for the influenza vaccine? YES    2.  Does the patient have any of the following contraindications?     Allergy to eggs? No     Allergic reaction to previous influenza vaccines? No     Any other problems to previous influenza vaccines? No     Paralyzed by Guillain-[place] syndrome? No     Currently pregnant? NO     Current moderate or severe illness? No     Allergy to contact lens solution? No    3.  The vaccine has been administered in the usual fashion and the patient was instructed to wait 20 minutes before leaving the building in the event of an allergic reaction: YES    Vaccination given by Lilia Cisse MA  .  Recorded by Lilia Cisse

## 2017-12-05 NOTE — PATIENT INSTRUCTIONS
Labs today   Try melatonin 3 mg bedtimes, start with 1/2 tab 1 hour prior to sleep   If that patel snot work call about trazodone  If no help see sleep specialist  Stress can do this too so consider counseling   Flu shot today   Pap due 2018  mammogram age 41   Consider hep a shots     General recommendations for sleep problems (Insomnia)  Allow 2-4 weeks to see results     Establish a regular sleep schedule   Go to bed at same time each night, get up at same time each day, avoid sleeping-in on weekends.  Cut down time in bed (if not asleep, get up, go in other room, do something calming and boring such as sorting papers, making warm milk, knitting, dusting)   Avoid trying to force yourself to sleep.  Use your bed only for sleep. Do not read or watch Television in bed.     Make the bedroom comfortable  Keepthe temperature in your bedroom comfortable, keep bedroom quiet when sleeping, and consider ear plugs (silicon) especially if you have partner who snores.  Keep bedroom dark enough:  use dark blinds or wear an eye mask if needed.    Relax at bedtime.    Do not watch tv or play video games or surf on computer right before bed; the full spectrum light actually stimulates your brain!  Relax each muscle group individually ; begin with your feet, work toward your head. Deal with your worries before bedtime by setting aside a worry time for 30 minutes earlier or journal your thoughts.  Listen to relaxation tapes such as Classical Music or Nature sounds or imagine a tranquil scene (e.g. waterfall or beach)   Back Massage : Gentle 5-minute back rub prior to bedtime can help relax you.    Perform measures to make you tired at bedtime.   Get regular Exercise each day (at least 6 hours before bedtime)   Take medications only as directed   Eat a light bedtime snack or warm drink, such as milk or herbal tea (non-caffeinated)   No Napping during day     Stimulus Control   Do not lie awake for more than 30 minutes.   Get out of  bed and perform a quiet activity, then return to bed when sleepy.  Repeat as many times per night as needed     Things to avoid   Do not Exercise just before bedtime   No overstimulating activities just before bed, such as competitive games or exciting Television programs  Avoid caffeine (coffee, tea, soda), chocolate after lunchtime   Do not use alcohol to induce sleep (worsens Insomnia)   Do not take someone else's sleeping pills   Do not look at the clock when awakening   Do not turn on light when getting up to use bathroom     Instructions for treating Delayed Sleep Phase Syndrome:    Delayed Sleep Phase Syndrome (DSPS) means that your body's internal timing is set late compared to the 24 hour day. Therefore, it is often difficult to get up on time for work in the morning and sometimes difficult to fall asleep on time, in order to get enough sleep. People with DSPS often tend to like to stay up late on weekends and sleep in until between 10 AM and noon, sometimes even later.This is actually a bad habit that will perpetuate the problem. It reinforces your body's tendency to be on that later schedule.    You should go to bed when you are sleepy and ready to sleep. During this entire process, you should not engage in activities that may make it worse, such as watching TV in bed, leaving the TV on all night, drinking any caffeine 6 hours before bed or exercising 1-2 hours before bed.     Start taking Melatonin, 1 mg tablet 3-5 hours before the time that you fall asleep on average (not your desired bedtime or time that you get in bed, but the time you normally fall asleep on your own).      Upon awakening, get exposure to sun-light for about 30-45 minutes. You do not need to look at the sun, in fact, this is dangerous. Reading the paper with the sun shining on you is adequate.  Alternatively, you may use a Seasonal Affective Disorder Lamp (intensity 10,000 Lux) instead of the sun. The lamp should be positioned 1-2  arms lengths away from you. They lamps are sold at Home [employer] Companies such as [employer], [employer] or [employer]. A prescription can be written to get insurance coverage in some cases. They are also sold on [employer].com.    Using the light and melatonin should help march your internal clock (known as your circadian rhythms) gradually earlier. As your bedtime advances, remember to take your melatonin earlier, keeping it 5 hours before your fall asleep time.    Avoid naps and sleeping in because sleeping during the day will delay your body's clock and you will have to start from scratch.     More information about light therapy:  If the cost of any light box is too much, you can also purchase a compact fluorescent all spectrum light bulb at a local hardware store for about $8.  The most commonly available bulb is 1400 lumen.  You would need two of these positioned within 1 meter of yourself to be equivalent to 2,500 lux.  The bulbs can be placed in a standard light fixture.  Additionally, they can be placed in a mountable fixture that is used in greenhouses.  Mountable fixtures are also available at hardware stores for about $9.  Do not look directly at the light.  If you have any concerns regarding the safety of bright light therapy for you, it is recommended that you consult an ophthalmologist before using a light box.  If you have a condition that makes your eyes very sensitive to light, macular degeneration, a family history of such problems, or diabetic changes to your eyes, consult an ophthalmologist before using a light box. If you have anxiety disorder and have an increase in anxiety discontinue use.  Make a relaxing routine prior to bedtime  Relaxation exercises:              Progressive muscle relaxation: Relax each muscle group individually                Begin with your feet, flex, then relax. Try to imagine your feet feeling heavy and sinking into the bed. Move to your calves, do the same thing.  Work through each muscle group toward your head.              Relaxing Mental Imagery: Try to imagine a trip that you took and found relaxing, or imagine a day at the beach. Try to walk yourself through the day in your mind as if you were dreaming it. Try to imagine sensing the different experiences, such as feeling sand between your toes, the heat of the sun on your skin, seeing the waves crashing the shore, the smell of the salt water, etc.     Deal with your worries before bedtime                Set aside a worry time around dinner time for 10-15 minutes. Write down the                things that are on your mind. Plan time in the coming days to address those                issues. Brainstorm ideas on how you will deal with them. Try to identify issues                that are out of your control, and try to let those issues go.  Listen to relaxation tapes    Classical Music or Nature sounds    Back Massage    Get regular exercise each day (at least 1-2 hours before bedtime)    Take medications only as directed    Eat a light bedtime snack or warm drink    Warm milk    Warm herbal tea (non-caffeinated)       Things to avoid    No overstimulating activities just before bed    No competitive games before bedtime    No exciting television programs before bedtime    Avoid caffeine after lunchtime    Avoid chocolate    Do not use alcohol to induce sleep (worsens Insomnia)    Do not take someone else's sleeping pills    Do not look at the clock when awakening    Do not turn on light when getting up to use bathroom, use a nightlight     Online Programs     www.[employer].me (pronounced shut eye). There is a fee for this program. Enter the code  Rusk  if you decide to enroll in this program.        www.sleepIO.com (pronounced sleep ee oh). There is a fee for this program. Enter the code  Rusk  if you decide to enroll in this program.    Suggested Resources  Insomnia Treatment Books      Overcoming Insomnia by Jean Marie CHA  Jarrod and Wendie Oliver (2008)    No More Sleepless Nights by Oziel Ortiz and Ching Zaragoza (1996)    Say Debbie to Insomnia by David Padgett (2009)    The Insomnia Workbook by Adri Lima and Harish Davis (2009)    The Insomnia Answer by Alvin Stephenson and German Santana (2006)      Stress Management and Relaxation Books    The Relaxation and Stress Reduction Workbook by Desirae Mendez, Nery Angeles and Jeremiah Feldman (2008)    Stress Management Workbook: Techniques and Self-Assessment Procedures by Kat Boo and Desmond Palencia (1997)    A Mindfulness-Based Stress Reduction Workbook by Andreas Asher and Charlene Ortiz (2010)    The Complete Stress Management Workbook by Juliocesar Olson, Cas Skelton and Aric Harvey (1996)    Assert Yourself by Denice Dawson and Benjamin Dawson (1977)    Relaxation Resources for Computer Download   These websites offer resources to help you relax. This list is for information only. [facility] is not responsible for the quality of services or the actions of any person or organization.  Progressive Muscle Relaxation (PMR):      http://www.Rypple/progressive-muscle-relaxation-exercise.html    http://studentsupport.[facility]/counseling/resources/self-help/relaxation-and-stress-management/  Deep Breathing Exercises:    http://www.InVivioLink.Panorama9/breathing-awareness.html    Meditation:         www.Brayola    www.theZignal LabsguidedZignal Labsmeditation-site.com You may have to pay for some of these resources.    Guided Imagery:    http://www.Rypple/guided-imagery-scripts.html      http://Surfkitchen/library/xtvilkmvuy-estgii-vgroshj/    Counseling / Behavioral Health  [facility]  Visit [email or web address] or call [phone] to find a clinic close to you.  Or call [phone] for [facility].

## 2017-12-06 LAB
ALBUMIN SERPL-MCNC: 4.4 G/DL (ref 3.4–5)
ALP SERPL-CCNC: 73 U/L (ref 40–150)
ALT SERPL W P-5'-P-CCNC: 21 U/L (ref 0–50)
ANION GAP SERPL CALCULATED.3IONS-SCNC: 7 MMOL/L (ref 3–14)
AST SERPL W P-5'-P-CCNC: 19 U/L (ref 0–45)
BILIRUB SERPL-MCNC: 0.6 MG/DL (ref 0.2–1.3)
BUN SERPL-MCNC: 9 MG/DL (ref 7–30)
CALCIUM SERPL-MCNC: 9.5 MG/DL (ref 8.5–10.1)
CHLORIDE SERPL-SCNC: 106 MMOL/L (ref 94–109)
CHOLEST SERPL-MCNC: 186 MG/DL
CO2 SERPL-SCNC: 26 MMOL/L (ref 20–32)
CREAT SERPL-MCNC: 0.66 MG/DL (ref 0.52–1.04)
DEPRECATED CALCIDIOL+CALCIFEROL SERPL-MC: 34 UG/L (ref 20–75)
GFR SERPL CREATININE-BSD FRML MDRD: >90 ML/MIN/1.7M2
GLUCOSE SERPL-MCNC: 84 MG/DL (ref 70–99)
HDLC SERPL-MCNC: 65 MG/DL
LDLC SERPL CALC-MCNC: 101 MG/DL
NONHDLC SERPL-MCNC: 121 MG/DL
POTASSIUM SERPL-SCNC: 3.9 MMOL/L (ref 3.4–5.3)
PROT SERPL-MCNC: 8.4 G/DL (ref 6.8–8.8)
SODIUM SERPL-SCNC: 139 MMOL/L (ref 133–144)
TRIGL SERPL-MCNC: 98 MG/DL
TSH SERPL DL<=0.005 MIU/L-ACNC: 1.18 MU/L (ref 0.4–4)

## 2017-12-06 ASSESSMENT — ANXIETY QUESTIONNAIRES: GAD7 TOTAL SCORE: 4

## 2017-12-06 NOTE — PROGRESS NOTES
Ilana Ms. Will,  Your results came back and are within acceptable limits. -Vitamin D level is normal, 1000 IU daily in diet or supplements is recommended. . If you have any further concerns please do not hesitate to contact us by message, phone or making an appointment.  Have a good day   Dr Alvin HOLDEN

## 2017-12-06 NOTE — PROGRESS NOTES
Ilana Ms. Will,  Your results came back and are within acceptable limits. -Liver and gallbladder tests are normal. (ALT,AST, Alk phos, bilirubin), kidney function is normal (Cr, GFR), Sodium is normal, Potassium is normal, Calcium is normal, Glucose is normal (diabetes screening test).   -Cholesterol levels (LDL,HDL, Triglycerides) are normal.  ADVISE: rechecking in 1 year.  -TSH (thyroid stimulating hormone) level is normal which indicates normal thyroid function.. If you have any further concerns please do not hesitate to contact us by message, phone or making an appointment.  Have a good day   Dr Alvin HOLDEN

## 2017-12-07 ENCOUNTER — TELEPHONE (OUTPATIENT)
Dept: SLEEP MEDICINE | Facility: CLINIC | Age: 27
End: 2017-12-07

## 2017-12-07 NOTE — PROGRESS NOTES
SUBJECTIVE:   Ofelia Will is a 27 year old female who presents to clinic today for the following health issues:      Insomnia      Duration: sep 2017    Description  Frequency of insomnia:  several times a week  Time to fall asleep: 6 hrs  Middle of night awakening:  several times a week  Early morning awakening:  Not sure pt have an alarm to wake her up to work    Accompanying signs and symptoms:  excessive daytime sleepiness, fatigue, morning headache and no recent weight gain    History  Similar episodes in past:  YES- not as bad  Previous evaluation/sleep study:  no     Precipitating or alleviating factors:  New stressful situation: YES  Caffeine intake after lunchtime: no   OTC decongestants: no   Any new medications: no     Therapies tried and outcome: OCT sleep aid    Stressed. Buying and closing on new house in [facility], Interviewing for jobs in the area Trying to get pregnant last 5 months. Tried oct sleep aid like Nyquil which sometimes worked but getting expensive.   LMP 2 weeks ago. Not pregnant currently     usually has TV going for noise at night as when not on feels thinks too much .   Tries to go to bed 8 rs to sleep and 1 hour to fall asleep    Would like flu shot     Agreeable to getting lipids today not done earlier    PHQ-9 ([employer]) 12/5/2017   1.  Little interest or pleasure in doing things 0   2.  Feeling down, depressed, or hopeless 0   3.  Trouble falling or staying asleep, or sleeping too much 2   4.  Feeling tired or having little energy 2   5.  Poor appetite or overeating 0   6.  Feeling bad about yourself 0   7.  Trouble concentrating 0   8.  Moving slowly or restless 0   9.  Suicidal or self-harm thoughts 0   PHQ-9 Total Score 4   Difficulty at work, home, or with people Somewhat difficult   TATE-7   [employer], 2002; Used with Permission) 12/5/2017   1. Feeling nervous, anxious, or on edge 1   2. Not being able to stop or control worrying 1   3. Worrying too much about different  things 1   4. Trouble relaxing 1   5. Being so restless that it is hard to sit still 0   6. Becoming easily annoyed or irritable 0   7. Feeling afraid, as if something awful might happen 0   TATE-7 Total Score 4   If you checked any problems, how difficult have they made it for you to do your work, take care of things at home, or get along with other people? Somewhat difficult       Problem list and histories reviewed & adjusted, as indicated.  Additional history: as documented    There is no problem list on file for this patient.    Past Surgical History:   Procedure Laterality Date     REPAIR ATRIAL SEPTAL DEFECT      at age 11 to 13        Social History   Substance Use Topics     Smoking status: Never Smoker     Smokeless tobacco: Never Used     Alcohol use Yes      Comment: 3 beverages on a average week      Family History   Problem Relation Age of Onset     Breast Cancer Mother      Chronic Obstructive Pulmonary Disease Mother      Hypertension Mother      Depression Mother      CEREBROVASCULAR DISEASE Father      loss of vision one eye , age 54     Hyperlipidemia Father      Myocardial Infarction Maternal Grandfather      age 50     Myocardial Infarction Paternal Grandfather          Current Outpatient Prescriptions   Medication Sig Dispense Refill     melatonin 3 MG tablet Take 1 tablet (3 mg) by mouth nightly as needed for sleep 30 tablet 0     No Known Allergies  Recent Labs   Lab Test  12/05/17   1414   LDL  101*   HDL  65   TRIG  98   ALT  21   CR  0.66   GFRESTIMATED  >90   GFRESTBLACK  >90   POTASSIUM  3.9   TSH  1.18      BP Readings from Last 3 Encounters:   12/05/17 128/81   05/26/17 119/82    Wt Readings from Last 3 Encounters:   12/05/17 144 lb (65.3 kg)   05/26/17 142 lb 4 oz (64.5 kg)                  Labs reviewed in EPIC          Reviewed and updated as needed this visit by clinical staff     Reviewed and updated as needed this visit by Provider         ROS:  Constitutional, HEENT,  cardiovascular, pulmonary, GI, , musculoskeletal, neuro, skin, endocrine and psych systems are negative, except as otherwise noted.      OBJECTIVE:   /81 (BP Location: Left arm, Patient Position: Chair, Cuff Size: Adult Regular)  Pulse 101  Temp 98.7  F (37.1  C) (Oral)  Resp 18  Wt 144 lb (65.3 kg)  SpO2 98%  BMI 25.51 kg/m2  Body mass index is 25.51 kg/(m^2).  GENERAL: healthy, alert and no distress  EYES: Eyes grossly normal to inspection, PERRL and conjunctivae and sclerae normal  HENT: ear canals and TM's normal, nose and mouth without ulcers or lesions  NECK: no adenopathy, no asymmetry, masses, or scars and thyroid normal to palpation  RESP: lungs clear to auscultation - no rales, rhonchi or wheezes  BREAST: normal without masses, tenderness or nipple discharge and no palpable axillary masses or adenopathy  CV: regular rate and rhythm, normal S1 S2, no S3 or S4, no murmur, click or rub, no peripheral edema and peripheral pulses strong  ABDOMEN: soft, non tender, no hepatosplenomegaly, no masses and bowel sounds normal  MS: no gross musculoskeletal defects noted, no edema  SKIN: no suspicious lesions or rashes  NEURO: Normal strength and tone, mentation intact and speech normal  PSYCH: mentation appears normal, affect normal/bright    Diagnostic Test Results:  No results found for this or any previous visit (from the past 24 hour(s)).    ASSESSMENT/PLAN:   Moved from [place] in 2015 and works with [employer], Prior ASD repair, left otitis media, vit d deficiency, hx of depression & anemia,Fh breast cancer mother, UTD with HPV shots out of state, pap 2015 normal due in 2018, seen for a physical 5/26/17, tdap given, labs ordered not done, treated for right otitis media with amoxil, has dry skin, chronically decreased sense of smell. Rare headaches, here for     1. Insomnia, unspecified type  Suspect stress regarding new house job hunt, trying to get pregnant all playing a role in insomnia. Maybe some  anxiety too given thoughts at night requiring TV to prevent thinking too much. Blue light from TV and sleep habits likely too affecting sleep. Sleep hygiene discussed & resources given as below. .check Labs to check other causes.today. Try melatonin 3 mg bedtimes, start with 1/2 tab 1 hour prior to sleep. If that does not work call about trazodone. Neither should affect ovulation but may need to be stopped once gets pregnant.If no help see sleep specialist. Advised that Stress could be making insomnia worse and may benefit from counseling. Flu shot today . Pap due 2018. mammogram age 41 given family hx. Records from [place] received did not show immunization status. To Consider hep a shots     - [place] [phone]  (Age 18 and [place] Future  - TSH  - Vitamin D deficiency screening  - CBC with platelets differential  - Comprehensive metabolic panel  - MENTAL HEALTH REFERRAL  - Adult; Assessments and Testing; General Psychological Testing; [facility]: [facility] [phone]; We will contact you to schedule the appointment or please call with any questions  - melatonin 3 MG tablet; Take 1 tablet (3 mg) by mouth nightly as needed for sleep  Dispense: 30 tablet; Refill: 0    2. Other fatigue  Unclear etiology/diagnosis with uncertain prognosis requiring further workup below.  - SLEEP [place]ALUATION & MANAG[place] [phone]  (Age 18 and up); Future  - TSH  - Vitamin D deficiency screening  - CBC with platelets differential  - Comprehensive metabolic panel  - [facility] HEALTH REFERRAL  - Adult; Assessments and Testing; General Psychological Testing; [facility]: [facility] [phone]; We will contact you to schedule the appointment or please call with any questions    3. Stress  - TSH  - MENTAL HEALTH REFERRAL  - Adult; Assessments and Testing; General Psychological Testing;  [facility]: [facility] [phone]; We will contact you to schedule the appointment or please call with any questions    4. Daytime sleepiness  - SLEEP EVALUATION & MANAGEMENT REFERRAL - ADULT -[facility] [place] [phone]  (Age 18 and up); Future    5. History of depression  - TSH    6. History of anemia  - CBC with platelets differential    7. History of vitamin D deficiency  - Vitamin D deficiency screening    8. Need for prophylactic vaccination and inoculation against influenza  - VACCINE ADMINISTRATION, INITIAL  - HC FLU VAC PRESRV FREE QUAD SPLIT VIR 3+YRS IM    9. Need for hepatitis A immunization  Will think about it     10. Encounter for screening for cardiovascular disorders  - Lipid panel reflex to direct LDL Fasting    See Patient Instructions  General recommendations for sleep problems (Insomnia)  Allow 2-4 weeks to see results     Establish a regular sleep schedule   Go to bed at same time each night, get up at same time each day, avoid sleeping-in on weekends.  Cut down time in bed (if not asleep, get up, go in other room, do something calming and boring such as sorting papers, making warm milk, knitting, dusting)   Avoid trying to force yourself to sleep.  Use your bed only for sleep. Do not read or watch Television in bed.     Make the bedroom comfortable  Keepthe temperature in your bedroom comfortable, keep bedroom quiet when sleeping, and consider ear plugs (silicon) especially if you have partner who snores.  Keep bedroom dark enough:  use dark blinds or wear an eye mask if needed.    Relax at bedtime.    Do not watch TV or play video games or surf on computer right before bed; the full spectrum light actually stimulates your brain!  Relax each muscle group individually ; begin with your feet, work toward your head. Deal with your worries before bedtime by setting aside a worry time for 30 minutes earlier or journal your thoughts.  Listen to relaxation tapes such  as Classical Music or Nature sounds or imagine a tranquil scene (e.G. waterfall or beach)   Back Massage : Gentle 5-minute back rub prior to bedtime can help relax you.    Perform measures to make you tired at bedtime.   Get regular Exercise each day (at least 6 hours before bedtime)   Take medications only as directed   Eat a light bedtime snack or warm drink, such as milk or herbal tea (non-caffeinated)   No Napping during day     Stimulus Control   Do not lie awake for more than 30 minutes.   Get out of bed and perform a quiet activity, then return to bed when sleepy.  Repeat as many times per night as needed     Things to avoid   Do not Exercise just before bedtime   No overstimulating activities just before bed, such as competitive games or exciting Television programs  Avoid caffeine (coffee, tea, soda), chocolate after lunchtime   Do not use alcohol to induce sleep (worsens Insomnia)   Do not take someone else's sleeping pills   Do not look at the clock when awakening   Do not turn on light when getting up to use bathroom     Instructions for treating Delayed Sleep Phase Syndrome:    Delayed Sleep Phase Syndrome (DSPS) means that your body's internal timing is set late compared to the 24 hour day. Therefore, it is often difficult to get up on time for work in the morning and sometimes difficult to fall asleep on time, in order to get enough sleep. People with DSPS often tend to like to stay up late on weekends and sleep in until between 10 AM and noon, sometimes even later.This is actually a bad habit that will perpetuate the problem. It reinforces your body's tendency to be on that later schedule.    You should go to bed when you are sleepy and ready to sleep. During this entire process, you should not engage in activities that may make it worse, such as watching TV in bed, leaving the TV on all night, drinking any caffeine 6 hours before bed or exercising 1-2 hours before bed.     Start taking Melatonin, 1  mg tablet 3-5 hours before the time that you fall asleep on average (not your desired bedtime or time that you get in bed, but the time you normally fall asleep on your own).      Upon awakening, get exposure to sun-light for about 30-45 minutes. You do not need to look at the sun, in fact, this is dangerous. Reading the paper with the sun shining on you is adequate.  Alternatively, you may use a Seasonal Affective Disorder Lamp (intensity 10,000 Lux) instead of the sun. The lamp should be positioned 1-2 arms lengths away from you. They lamps are sold at [employer] such as [employer], [employer] or [employer]. A prescription can be written to get insurance coverage in some cases. They are also sold on Amazon.com.    Using the light and melatonin should help march your internal clock (known as your circadian rhythms) gradually earlier. As your bedtime advances, remember to take your melatonin earlier, keeping it 5 hours before your fall asleep time.    Avoid naps and sleeping in because sleeping during the day will delay your body's clock and you will have to start from scratch.     More information about light therapy:  If the cost of any light box is too much, you can also purchase a compact fluorescent all spectrum light bulb at a local hardware store for about $8.  The most commonly available bulb is 1400 lumen.  You would need two of these positioned within 1 meter of yourself to be equivalent to 2,500 lux.  The bulbs can be placed in a standard light fixture.  Additionally, they can be placed in a mountable fixture that is used in greenhouses.  Mountable fixtures are also available at hardware stores for about $9.  Do not look directly at the light.  If you have any concerns regarding the safety of bright light therapy for you, it is recommended that you consult an ophthalmologist before using a light box.  If you have a condition that makes your eyes very sensitive to light, macular  degeneration, a family history of such problems, or diabetic changes to your eyes, consult an ophthalmologist before using a light box. If you have anxiety disorder and have an increase in anxiety discontinue use.  Make a relaxing routine prior to bedtime  Relaxation exercises:              Progressive muscle relaxation: Relax each muscle group individually                Begin with your feet, flex, then relax. Try to imagine your feet feeling heavy and sinking into the bed. Move to your calves, do the same thing. Work through each muscle group toward your head.              Relaxing Mental Imagery: Try to imagine a trip that you took and found relaxing, or imagine a day at the beach. Try to walk yourself through the day in your mind as if you were dreaming it. Try to imagine sensing the different experiences, such as feeling sand between your toes, the heat of the sun on your skin, seeing the waves crashing the shore, the smell of the salt water, etc.     Deal with your worries before bedtime                Set aside a worry time around dinner time for 10-15 minutes. Write down the                things that are on your mind. Plan time in the coming days to address those                issues. Brainstorm ideas on how you will deal with them. Try to identify issues                that are out of your control, and try to let those issues go.  Listen to relaxation tapes    Classical Music or Nature sounds    Back Massage    Get regular exercise each day (at least 1-2 hours before bedtime)    Take medications only as directed    Eat a light bedtime snack or warm drink    Warm milk    Warm herbal tea (non-caffeinated)       Things to avoid    No overstimulating activities just before bed    No competitive games before bedtime    No exciting television programs before bedtime    Avoid caffeine after lunchtime    Avoid chocolate    Do not use alcohol to induce sleep (worsens Insomnia)    Do not take someone else's  sleeping pills    Do not look at the clock when awakening    Do not turn on light when getting up to use bathroom, use a nightlight     Online Programs     www.SHUTi.me (pronounced shut eye). There is a fee for this program. Enter the code  [place]  if you decide to enroll in this program.        www.sleepIO.com (pronounced sleep ee oh). There is a fee for this program. Enter the code  [place]  if you decide to enroll in this program.    Suggested Resources  Insomnia Treatment Books      Overcoming Insomnia by Jean Marie Garay and Wendie Oliver (2008)    No More Sleepless Nights by Oziel Ortiz and Ching Zaragoza (1996)    Say Debbie to Insomnia by David Padgett (2009)    The Insomnia Workbook by Adri Lima and Harish Davis (2009)    The Insomnia Answer by Alvin Stephenson and German Santana (2006)      Stress Management and Relaxation Books    The Relaxation and Stress Reduction Workbook by Desirae Mendez, Nery Angeles and Jeremiah Feldman (2008)    Stress Management Workbook: Techniques and Self-Assessment Procedures by Kat Boo and Desmond Palencia (1997)    A Mindfulness-Based Stress Reduction Workbook by Andreas Asher and Charlene Ortiz (2010)    The Complete Stress Management Workbook by Juliocesar Olson, Cas Skelton and Aric Harvey (1996)    Assert Yourself by Denice Dawson and Benjamin Dawson (1977)    Relaxation Resources for Computer Download   These websites offer resources to help you relax. This list is for information only. [place] is not responsible for the quality of services or the actions of any person or organization.  Progressive Muscle Relaxation (PMR):      http://www.[employer].com/progressive-muscle-relaxation-exercise.html    http://student support.[facility]/counseling/resources/self-help/relaxation-and-stress-management/  Deep Breathing Exercises:    http://www.[employer]/breathing-awareness.html    Meditation:          www.[employer]    www.the-guided-meditation-site.com You may have to pay for some of these resources.    Guided Imagery:    http://www.[employer]/guided-imagery-scripts.html      http://[employer]/library/uyryoymiux-fewxgx-ivqjjyj/    Counseling / Behavioral Health  [facility]  Visit www.[email or web address].[email or web address] or call [phone] to find a clinic close to you.  Or call [phone] for [facility].      Guerda Esquivel MD  [facility]

## 2017-12-07 NOTE — TELEPHONE ENCOUNTER
I called the patient to schedule a consult patient asked that we not call her back as she will call us to schedule. That is, if she wants to come in.    Bibiana Rice

## 2018-02-19 DIAGNOSIS — Z32.01 PREGNANCY TEST POSITIVE: Primary | ICD-10-CM

## 2018-03-01 ENCOUNTER — PRENATAL OFFICE VISIT (OUTPATIENT)
Dept: NURSING | Facility: CLINIC | Age: 28
End: 2018-03-01
Payer: COMMERCIAL

## 2018-03-01 DIAGNOSIS — Z34.00 SUPERVISION OF NORMAL FIRST PREGNANCY, ANTEPARTUM: ICD-10-CM

## 2018-03-01 DIAGNOSIS — Z32.01 PREGNANCY TEST POSITIVE: Primary | ICD-10-CM

## 2018-03-01 LAB
ABO + RH BLD: NORMAL
ABO + RH BLD: NORMAL
BETA HCG QUAL IFA URINE: POSITIVE
BLD GP AB SCN SERPL QL: NORMAL
BLOOD BANK CMNT PATIENT-IMP: NORMAL
ERYTHROCYTE [DISTWIDTH] IN BLOOD BY AUTOMATED COUNT: 11.8 % (ref 10–15)
HCT VFR BLD AUTO: 36.8 % (ref 35–47)
HGB BLD-MCNC: 12.3 G/DL (ref 11.7–15.7)
MCH RBC QN AUTO: 30.2 PG (ref 26.5–33)
MCHC RBC AUTO-ENTMCNC: 33.4 G/DL (ref 31.5–36.5)
MCV RBC AUTO: 90 FL (ref 78–100)
PLATELET # BLD AUTO: 271 10E9/L (ref 150–450)
RBC # BLD AUTO: 4.07 10E12/L (ref 3.8–5.2)
SPECIMEN EXP DATE BLD: NORMAL
WBC # BLD AUTO: 8.9 10E9/L (ref 4–11)

## 2018-03-01 PROCEDURE — 84703 CHORIONIC GONADOTROPIN ASSAY: CPT | Performed by: OBSTETRICS & GYNECOLOGY

## 2018-03-01 PROCEDURE — 86850 RBC ANTIBODY SCREEN: CPT | Performed by: OBSTETRICS & GYNECOLOGY

## 2018-03-01 PROCEDURE — 87086 URINE CULTURE/COLONY COUNT: CPT | Performed by: OBSTETRICS & GYNECOLOGY

## 2018-03-01 PROCEDURE — 87340 HEPATITIS B SURFACE AG IA: CPT | Performed by: OBSTETRICS & GYNECOLOGY

## 2018-03-01 PROCEDURE — 87389 HIV-1 AG W/HIV-1&-2 AB AG IA: CPT | Performed by: OBSTETRICS & GYNECOLOGY

## 2018-03-01 PROCEDURE — 86780 TREPONEMA PALLIDUM: CPT | Performed by: OBSTETRICS & GYNECOLOGY

## 2018-03-01 PROCEDURE — 86901 BLOOD TYPING SEROLOGIC RH(D): CPT | Performed by: OBSTETRICS & GYNECOLOGY

## 2018-03-01 PROCEDURE — 86900 BLOOD TYPING SEROLOGIC ABO: CPT | Performed by: OBSTETRICS & GYNECOLOGY

## 2018-03-01 PROCEDURE — 85027 COMPLETE CBC AUTOMATED: CPT | Performed by: OBSTETRICS & GYNECOLOGY

## 2018-03-01 PROCEDURE — 36415 COLL VENOUS BLD VENIPUNCTURE: CPT | Performed by: OBSTETRICS & GYNECOLOGY

## 2018-03-01 PROCEDURE — 86762 RUBELLA ANTIBODY: CPT | Performed by: OBSTETRICS & GYNECOLOGY

## 2018-03-01 PROCEDURE — 99207 ZZC NO CHARGE NURSE ONLY: CPT

## 2018-03-01 RX ORDER — PRENATAL VIT/IRON FUM/FOLIC AC 27MG-0.8MG
1 TABLET ORAL DAILY
Qty: 100 TABLET | Refills: 3 | COMMUNITY
Start: 2018-03-01

## 2018-03-01 NOTE — NURSING NOTE
NPN nurse visit. 1st dr visit scheduled for 3/5/18 with Kaila Bacon D.O. Pap due. Last pap 3/2015.  10w1d.   FRANKIE Martinez RN

## 2018-03-02 LAB
BACTERIA SPEC CULT: NORMAL
HBV SURFACE AG SERPL QL IA: NONREACTIVE
HIV 1+2 AB+HIV1 P24 AG SERPL QL IA: NONREACTIVE
RUBV IGG SERPL IA-ACNC: 8 IU/ML
SPECIMEN SOURCE: NORMAL
T PALLIDUM IGG+IGM SER QL: NEGATIVE

## 2018-03-05 ENCOUNTER — PRENATAL OFFICE VISIT (OUTPATIENT)
Dept: OBGYN | Facility: CLINIC | Age: 28
End: 2018-03-05
Payer: COMMERCIAL

## 2018-03-05 ENCOUNTER — RADIANT APPOINTMENT (OUTPATIENT)
Dept: ULTRASOUND IMAGING | Facility: CLINIC | Age: 28
End: 2018-03-05
Payer: COMMERCIAL

## 2018-03-05 VITALS — BODY MASS INDEX: 25.86 KG/M2 | WEIGHT: 146 LBS | DIASTOLIC BLOOD PRESSURE: 62 MMHG | SYSTOLIC BLOOD PRESSURE: 118 MMHG

## 2018-03-05 DIAGNOSIS — M54.41 ACUTE BILATERAL LOW BACK PAIN WITH RIGHT-SIDED SCIATICA: ICD-10-CM

## 2018-03-05 DIAGNOSIS — K21.9 GASTROESOPHAGEAL REFLUX DISEASE WITHOUT ESOPHAGITIS: ICD-10-CM

## 2018-03-05 DIAGNOSIS — O21.9 NAUSEA/VOMITING IN PREGNANCY: ICD-10-CM

## 2018-03-05 DIAGNOSIS — Z86.59 H/O: DEPRESSION: ICD-10-CM

## 2018-03-05 DIAGNOSIS — Z34.01 SUPERVISION OF NORMAL FIRST PREGNANCY IN FIRST TRIMESTER: Primary | ICD-10-CM

## 2018-03-05 DIAGNOSIS — Z87.74 HX OF ATRIAL SEPTAL DEFECT: ICD-10-CM

## 2018-03-05 DIAGNOSIS — Z32.01 PREGNANCY TEST POSITIVE: ICD-10-CM

## 2018-03-05 LAB — MISCELLANEOUS TEST: NORMAL

## 2018-03-05 PROCEDURE — 99207 ZZC FIRST OB VISIT: CPT | Performed by: OBSTETRICS & GYNECOLOGY

## 2018-03-05 PROCEDURE — 76815 OB US LIMITED FETUS(S): CPT | Performed by: OBSTETRICS & GYNECOLOGY

## 2018-03-05 PROCEDURE — 87491 CHLMYD TRACH DNA AMP PROBE: CPT | Performed by: OBSTETRICS & GYNECOLOGY

## 2018-03-05 PROCEDURE — 40000791 ZZHCL STATISTIC VERIFI PRENATAL TRISOMY 21,18,13: Mod: 90 | Performed by: OBSTETRICS & GYNECOLOGY

## 2018-03-05 PROCEDURE — 87591 N.GONORRHOEAE DNA AMP PROB: CPT | Performed by: OBSTETRICS & GYNECOLOGY

## 2018-03-05 PROCEDURE — 99000 SPECIMEN HANDLING OFFICE-LAB: CPT | Performed by: OBSTETRICS & GYNECOLOGY

## 2018-03-05 PROCEDURE — G0145 SCR C/V CYTO,THINLAYER,RESCR: HCPCS | Performed by: OBSTETRICS & GYNECOLOGY

## 2018-03-05 PROCEDURE — 36415 COLL VENOUS BLD VENIPUNCTURE: CPT | Performed by: OBSTETRICS & GYNECOLOGY

## 2018-03-05 NOTE — NURSING NOTE
"Chief Complaint   Patient presents with     Prenatal Care       Initial /62  Wt 146 lb (66.2 kg)  LMP 2017  BMI 25.86 kg/m2 Estimated body mass index is 25.86 kg/(m^2) as calculated from the following:    Height as of 17: 5' 3\" (1.6 m).    Weight as of this encounter: 146 lb (66.2 kg).  Medication Reconciliation: complete         +nausea and heartburn    Tracee Barron CMA      "

## 2018-03-05 NOTE — MR AVS SNAPSHOT
After Visit Summary   3/5/2018    Ofelia Will    MRN: [id number]           Patient Information     Date Of Birth          1990        Visit Information        Provider Department      3/5/2018 10:45 AM Kaila Bacon DO [facility]        Today's Diagnoses     Supervision of normal first pregnancy in first trimester    -  1    Acute bilateral low back pain with right-sided sciatica        Gastroesophageal reflux disease without esophagitis        Hx of atrial septal defect          Care Instructions    Unisom, B6  - for nausea          Follow-ups after your visit        Additional Services     CARDIOLOGY EVAL ADULT REFERRAL       Preferred location:  [facility] [phone]   [email or web address]    Please be aware that coverage of these services is subject to the terms and limitations of your health insurance plan.  Call member services at your health plan with any benefit or coverage questions.      Please bring the following to your appointment:  Any x-rays, CTs or MRIs which have been performed. Contact the facility where they were done to arrange for  prior to your scheduled appointment.    List of current medications  This referral request   Any documents/labs given to you for this referral            [facility] PT, HAND, AND CHIROPRACTIC REFERRAL       **This order will print in the [facility] Scheduling Office**    Physical Therapy, Hand Therapy and Chiropractic Care are available through:    *[facility]  *[facility]  *[facility]    Call one number to schedule at any of the above locations: [phone].    Your provider has referred you to: Chiropractic at [facility] or [facility]    Indication/Reason for Referral: Low Back Pain  Onset of Illness: 2 months ago  Therapy Orders: Evaluate and Treat  Special Programs: None  Special Request:  None    Donte Kent      Additional Comments for the Therapist or Chiropractor: With Sciatica    Please be aware that coverage of these services is subject to the terms and limitations of your health insurance plan.  Call member services at your health plan with any benefit or coverage questions.      Please bring the following to your appointment:    *Your personal calendar for scheduling future appointments  *Comfortable clothing                  Follow-up notes from your care team     Return in about 4 weeks (around 4/2/2018).      Who to contact     If you have questions or need follow up information about today's clinic visit or your schedule please contact [facility] directly at [phone].  Normal or non-critical lab and imaging results will be communicated to you by EarthLinkhart, letter or phone within 4 business days after the clinic has received the results. If you do not hear from us within 7 days, please contact the clinic through frentingt or phone. If you have a critical or abnormal lab result, we will notify you by phone as soon as possible.  Submit refill requests through Crowdnetic or call your pharmacy and they will forward the refill request to us. Please allow 3 business days for your refill to be completed.          Additional Information About Your Visit        EarthLinkhart Information     Crow[employer]etic gives you secure access to your electronic health record. If you see a primary care provider, you can also send messages to your care team and make appointments. If you have questions, please call your primary care clinic.  If you do not have a primary care provider, please call [phone] and they will assist you.        Care EveryWhere ID     This is your Care EveryWhere ID. This could be used by other organizations to access your [place] medical records  [id number]        Your Vitals Were     Last Period BMI (Body Mass Index)                12/20/2017 25.86 kg/m2           Blood  Pressure from Last 3 Encounters:   03/05/18 118/62   12/05/17 128/81   05/26/17 119/82    Weight from Last 3 Encounters:   03/05/18 146 lb (66.2 kg)   12/05/17 144 lb (65.3 kg)   05/26/17 142 lb 4 oz (64.5 kg)              We Performed the Following     CARDIOLOGY EVAL ADULT REFERRAL     [facility] PT, HAND, AND CHIROPRACTIC REFERRAL          Today's Medication Changes          These changes are accurate as of 3/5/18 11:24 AM.  If you have any questions, ask your nurse or doctor.               Start taking these medicines.        Dose/Directions    ranitidine 75 MG tablet   Commonly known as:  ZANTAC   Used for:  Gastroesophageal reflux disease without esophagitis   Started by:  Kaila Bacon,         Dose:  150 mg   Take 2 tablets (150 mg) by mouth 2 times daily   Quantity:  30 tablet   Refills:  3            Where to get your medicines      These medications were sent to [facility] [address] [place] [address] 42  AT [address] [address]     Phone:  [phone]     ranitidine 75 MG tablet                Primary Care Provider Office Phone # Fax #    Guerda Esquivel [phone] [address] [place] [address]        Equal Access to Services     MAHIN STEARNS AH: Hadii cierra grace hadasho Sokg, waaxda luqadaha, qaybta kaalmada adeegyada, viki gamez haypatricio gonzalez. So [facility] [phone].    ATENCIÓN: Si habla español, tiene a ferris disposición servicios gratuitos de asistencia lingüística. Llame al [phone].    We comply with applicable federal civil rights laws and [place] laws. We do not discriminate on the basis of race, color, national origin, age, disability, sex, sexual orientation, or gender identity.            Thank you!     Thank you for choosing [facility]  for your care. Our goal is always to provide you with excellent care. Hearing back from our patients is one way we can  continue to improve our services. Please take a few minutes to complete the written survey that you may receive in the mail after your visit with us. Thank you!             Your Updated Medication List - Protect others around you: Learn how to safely use, store and throw away your medicines at www.disposemymeds.org.          This list is accurate as of 3/5/18 11:24 AM.  Always use your most recent med list.                   Brand Name Dispense Instructions for use Diagnosis    prenatal multivitamin plus iron 27-0.8 MG Tabs per tablet     100 tablet    Take 1 tablet by mouth daily    Supervision of normal first pregnancy, antepartum       ranitidine 75 MG tablet    ZANTAC    30 tablet    Take 2 tablets (150 mg) by mouth 2 times daily    Gastroesophageal reflux disease without esophagitis

## 2018-03-06 LAB
C TRACH DNA SPEC QL NAA+PROBE: NEGATIVE
N GONORRHOEA DNA SPEC QL NAA+PROBE: NEGATIVE
SPECIMEN SOURCE: NORMAL
SPECIMEN SOURCE: NORMAL

## 2018-03-06 NOTE — PROGRESS NOTES
Initial Obstetrics Visit    Subjective: 27 year old  at 10w6d dated by LMP here today for initial OB visit. Patient reports Nausea, Symptoms of sciatica and acid reflux. Denies cramping and vaginal spotting.     OB History:   None, 1st preganncy    Gyn History:   Menses: LMP: Patient's last menstrual period was 2017.   Sexually transmitted disease history:None    Exercise: Occasional  Diet: Well balanced per patient  Immunizations: Received  H/o Chicken Pox or Varicella Vaccination: Yes (Vaccination)    Past Medical History:   Diagnosis Date     ASD (atrial septal defect)     s/p surgical closer age 11 to 13 in [place], asymptomatic several follow ups , last one  neg no further follow up      History of anemia     treatred with iron      History of depression     resolved     History of vitamin D deficiency      Initiation of Depo Provera      Left otitis media 2014    treated iwth augmentin     Past Surgical History:   Procedure Laterality Date     REPAIR ATRIAL SEPTAL DEFECT      at age 11 to 13      family history includes Breast Cancer in her mother; CEREBROVASCULAR DISEASE in her father; Chronic Obstructive Pulmonary Disease in her mother; Depression in her mother; Hyperlipidemia in her father; Hypertension in her mother; Myocardial Infarction in her maternal grandfather and paternal grandfather.  Social History     Social History     Marital status:      Spouse name: Chris     Number of children: N/A     Years of education: N/A     Occupational History     Teller      Social History Main Topics     Smoking status: Never Smoker     Smokeless tobacco: Never Used     Alcohol use No     Drug use: No     Sexual activity: Yes     Partners: Male     Other Topics Concern     Parent/Sibling W/ Cabg, Mi Or Angioplasty Before 65f 55m? No     Social History Narrative     to  , 2 cats macrotabby , long hair black cat , Moved from [place] in  due to husbands job. 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    Current Outpatient Prescriptions on File Prior to Visit:  Prenatal Vit-Fe Fumarate-FA (PRENATAL MULTIVITAMIN PLUS IRON) 27-0.8 MG TABS per tablet Take 1 tablet by mouth daily     No current facility-administered medications on file prior to visit.      Review Of Systems: Negative except as mentioned in HPI    Exam:  Vitals: /62  Wt 146 lb (66.2 kg)  LMP 2017  BMI 25.86 kg/m2  BMI= Body mass index is 25.86 kg/(m^2).    Constitutional: Healthy appearing  female. No acute distress.  HEENT: Normal appearance. Neck supple, thyroid normal in size without nodularity or masses.  Cardiovascular: Regular rate and rhythm without murmurs, clicks, gallops or rub.  Respiratory: Clear to auscultation bilaterally without crackles or wheeze.  Breast Exam: No visible masses or suspicious skin changes.  No discrete or dominant masses to palpation.  No axillary lymphadenopathy.  Gastrointestinal: Abdomen soft, non-tender. BS normal. No masses or organomegaly.  Pelvic Exam -    Vulva: Normal genitalia   Vagina: Normal mucosa, no abnormal discharge   Cervix: Nulliparous, closed, mobile,  no discharge, thin prep PAP and GC/Chlamydia obtained   Uterus: mildly enlarged   Adnexa: No masses, nodularity, tenderness  Skin: No suspicious lesions or rashes  Psychiatric: Mentation appears normal and affect normal      Lab Results   Component Value Date    HGB 12.3 2018    HGB 14.7 2017       Recent Labs   Lab Test  18   0949   ABO  O   RH  Pos   AS  Neg     Rhogam not indicated     Recent Labs   Lab Test  18   0949   HEPBANG  Nonreactive   HIAGAB  Nonreactive   RUQIGG  8         Assessment: 27 year old  at 10w6d here today for initial prenatal visit. Doing well overall.       1. Supervision of normal first pregnancy in first trimester  - Flu received, Tdap at 27+ weeks  -  CARDIOLOGY EVAL ADULT REFERRAL  - Has paternal uncle with Down Syndrome -- wants testing:  - Non Invasive Prenatal Test Cell Free DNA  - Trio panel: Laboratory Miscellaneous Order  - NEISSERIA GONORRHOEA PCR  - CHLAMYDIA TRACHOMATIS PCR  - Pap imaged thin layer screen reflex to HPV if ASCUS - recommended age 25 - 29 years  - Send outs misc test    2. Acute bilateral low back pain with right-sided sciatica  - reviewed stretches for improvement of syptoms  - desires chiropractic referral  - [facility] PT, HAND, AND CHIROPRACTIC REFERRAL    3. Gastroesophageal reflux disease without esophagitis  - Tums with minimal improvement  - ranitidine (ZANTAC) 75 MG tablet; Take 2 tablets (150 mg) by mouth 2 times daily  Dispense: 30 tablet; Refill: 3    4. Hx of atrial septal defect  - Had repair with mesh in [facility]  - CARDIOLOGY EVAL ADULT REFERRAL  - plan referral to [facility] for fetal echo    5. H/o Depression  - no current medications    6. Nausea in pregnancy  Unisom, B6 PRN    Additional Plan:  - Labs: Prenatal labs reviewed. GC/Chlam, PAP collected.    - Prenatal testing: Discussed options for screening for and diagnosis of chromosomal anomalies, including first trimester screen, noninvasive prenatal testing/cell-free fetal DNA testing, CVS/amniocentesis, quad screen, and ultrasound or comprehensive Level II US at 18-20 weeks. She is electing noninvasive prenatal testing and ultrasound at 18-20 weeks with fetal echo due to h/o ASD.    - Education: Reviewed early pregnancy education, diet, exercise, prenatal vitamins, intercourse. Reviewed the call schedule, labor and delivery, and the schedule of prenatal visits.     - Other/Follow-up: Follow up in 4 weeks.  Normal exercise.  Normal sexual activity.  Prenatal vitamins..         Kaila Bacon, DO  Obstetrics and Gynecology

## 2018-03-07 DIAGNOSIS — Q21.10 ASD (ATRIAL SEPTAL DEFECT): Primary | ICD-10-CM

## 2018-03-07 LAB
COPATH REPORT: NORMAL
PAP: NORMAL

## 2018-03-12 ENCOUNTER — TELEPHONE (OUTPATIENT)
Dept: OBGYN | Facility: CLINIC | Age: 28
End: 2018-03-12

## 2018-03-12 DIAGNOSIS — Z14.1 CYSTIC FIBROSIS CARRIER: ICD-10-CM

## 2018-03-12 DIAGNOSIS — Z87.74 H/O ATRIAL SEPTAL DEFECT: Primary | ICD-10-CM

## 2018-03-12 NOTE — TELEPHONE ENCOUNTER
Discussed results with patient. + for Cystic Fibrosis Carrier. Will have her see MFM & .     Gender placed in envelope by MA and brought to  for patient to .     Kaila Bacon, DO  OB/GYN

## 2018-03-12 NOTE — TELEPHONE ENCOUNTER
Results received from [facility] testing in [place] triage.  Testing done:  Innatal Prenatal Screen and Preparent Carrier Screen    Action:  Made a copy of ABNORMAL results (preparent) and placed them on provider desk (a copy will remain in triage).  Please advise.    Gender: **GIRL**  Will ask pt if they wish to know the gender.    FRANKIE Martinez RN

## 2018-04-03 PROBLEM — Q24.9 CONGENITAL HEART ANOMALY: Status: ACTIVE | Noted: 2018-04-03

## 2018-04-05 ENCOUNTER — OFFICE VISIT (OUTPATIENT)
Dept: PEDIATRIC CARDIOLOGY | Facility: CLINIC | Age: 28
End: 2018-04-05
Payer: COMMERCIAL

## 2018-04-05 ENCOUNTER — PRENATAL OFFICE VISIT (OUTPATIENT)
Dept: OBGYN | Facility: CLINIC | Age: 28
End: 2018-04-05
Payer: COMMERCIAL

## 2018-04-05 ENCOUNTER — HOSPITAL ENCOUNTER (OUTPATIENT)
Dept: CARDIOLOGY | Facility: CLINIC | Age: 28
Discharge: HOME | End: 2018-04-05
Payer: COMMERCIAL

## 2018-04-05 VITALS — BODY MASS INDEX: 25.69 KG/M2 | DIASTOLIC BLOOD PRESSURE: 60 MMHG | SYSTOLIC BLOOD PRESSURE: 114 MMHG | WEIGHT: 145 LBS

## 2018-04-05 VITALS
DIASTOLIC BLOOD PRESSURE: 78 MMHG | HEIGHT: 63 IN | SYSTOLIC BLOOD PRESSURE: 116 MMHG | OXYGEN SATURATION: 100 % | HEART RATE: 85 BPM | WEIGHT: 145.06 LBS | BODY MASS INDEX: 25.7 KG/M2

## 2018-04-05 DIAGNOSIS — Z34.92 PRENATAL CARE IN SECOND TRIMESTER: Primary | ICD-10-CM

## 2018-04-05 DIAGNOSIS — Z87.74 H/O ATRIAL SEPTAL DEFECT REPAIR: Primary | ICD-10-CM

## 2018-04-05 DIAGNOSIS — Z87.74 H/O ATRIAL SEPTAL DEFECT REPAIR: ICD-10-CM

## 2018-04-05 PROCEDURE — 99207 ZZC PRENATAL VISIT: CPT | Performed by: FAMILY MEDICINE

## 2018-04-05 PROCEDURE — 93005 ELECTROCARDIOGRAM TRACING: CPT | Mod: ZF

## 2018-04-05 PROCEDURE — 93320 DOPPLER ECHO COMPLETE: CPT

## 2018-04-05 PROCEDURE — G0463 HOSPITAL OUTPT CLINIC VISIT: HCPCS | Mod: ZF

## 2018-04-05 PROCEDURE — 94760 N-INVAS EAR/PLS OXIMETRY 1: CPT | Mod: ZF

## 2018-04-05 ASSESSMENT — PAIN SCALES - GENERAL: PAINLEVEL: NO PAIN (0)

## 2018-04-05 NOTE — NURSING NOTE
"Informant-    Ofelia is accompanied by self    Reason for Visit-  F/u ASD    Vitals signs-  /78 (BP Location: Right arm)  Pulse 85  Ht 1.596 m (5' 2.84\")  Wt 65.8 kg (145 lb 1 oz)  LMP 12/20/2017  SpO2 100%  BMI 25.83 kg/m2    There are concerns about the child's exposure to violence in the home: No    Face to Face time:  5 minutes    LUCERO Hunter, RN, CPN        "

## 2018-04-05 NOTE — NURSING NOTE
Chief Complaint   Patient presents with     RECHECK     f/u ASD        Med Reconcile: Reviewed and verified all current medications with the patient. The updated medication list was printed and given to the patient.  Return Appointment: Patient given instructions regarding scheduling next clinic visit. Patient demonstrated understanding of this information and agreed to call with further questions or concerns.  Patient stated she understood all health information given and agreed to call with further questions or concerns.     Elio Singh RN, BSN  Cardiology Care Coordinator  [facility]  [email or web address]  [phone]

## 2018-04-05 NOTE — PROGRESS NOTES
Adult Congenital Cardiology Clinic Note    Patient:  Ofelia Will MRN:  [id number]   YOB: 1990 Age:  27 year old   Date of Visit:  Apr 5, 2018 PCP:  Guerda Esquivel MD     Dear Dr. Esquivel,    I had the pleasure of seeing your patient Ofelia Will at the [facility] for a consultation on Apr 5, 2018 for follow up for ASD following repair.      History of Present Illness:     Ofelia Will is a 27 year old female with:     1. History of ASD, status post Amplatz septal occluder placed in 2006  2. Currently 15 weeks pregnant with first child    Ofelia is a 27-year-old female with past medical history of what sounds like fenestrated atrial septal defects which was diagnosed at birth by echocardiogram.  This was followed for a number of years as an outpatient and she underwent device closure in 2006 at the [facility] due to what sounds like  Right heart  heart enlargement. We do not have those records available.   Ofelia has done well since that time.  She denies chest pain, dizziness, fainting, shortness of breath, exertional dyspnea or cyanosis. There have been no recent infections or prolonged fever.  She does note that she has had a few episodes of what feels like irregular heart rhythm over the past several years.  This occurs only for a few seconds every few months.  There has been one episodes that occurred 2-3 years ago that lasted 30 minutes.  She does not participate in significant exercise but does note that she does not feel limited in ADL by cardiac/respiratory symptoms.  She was followed for a few years after device closure and was told she no longer needed cardiology follow up.  She is referred back to cardiology clinic today due to her first pregnancy.     Ofelia had significant nausea and vomiting during her first trimester. She has had minimal weight gain.    She is currently working as a  and is excited about her pregnancy with her .      Past Medical History:     PMH:  Past medical history was reviewed with Ofelia and updated. She has been healthy since her ASD closure with no serious illnesses. Hospitalizations or surgery. She took no medications prior to her pregnancy and did not use antibiotic prophylaxis for SBE. No recent dental evaluation.   Past Medical History:   Diagnosis Date     ASD (atrial septal defect)     s/p surgical closer age 11 to 13 in [place], asymptomatic several follow ups , last one 2010 neg no further follow up      History of anemia     treatred with iron      History of depression 2014    resolved     History of vitamin D deficiency      Initiation of Depo Provera 2014     Left otitis media 2014    treated iwth augmentin     Past surgical Hx: No previous surgical history  We do not have cath report or other cardiology records from [facility].     She has a current medication list which includes the following prescription(s): ranitidine and prenatal multivitamin plus iron. Shehas No Known Allergies.   Prescription Medications as of 4/6/2018             ranitidine (ZANTAC) 75 MG tablet Take 2 tablets (150 mg) by mouth 2 times daily    Prenatal Vit-Fe Fumarate-FA (PRENATAL MULTIVITAMIN PLUS IRON) 27-0.8 MG TABS per tablet Take 1 tablet by mouth daily          Family and Social History:     Family History   Problem Relation Age of Onset     Breast Cancer Mother      Chronic Obstructive Pulmonary Disease Mother      Hypertension Mother      Depression Mother      CEREBROVASCULAR DISEASE Father      loss of vision one eye , age 54     Hyperlipidemia Father      Myocardial Infarction Maternal Grandfather      age 50     Myocardial Infarction Paternal Grandfather    There is no family history of WPW syndrome, Brugada syndrome, or long QT syndrome.    ? h/o aneuysm in a maternal great aunt.     Social History  "    Social History     Marital status:      Spouse name: Chris     Number of children: N/A     Years of education: N/A     Occupational History     Teller      Social History Main Topics     Smoking status: Never Smoker     Smokeless tobacco: Never Used     Alcohol use No     Drug use: No     Sexual activity: Yes     Partners: Male     Other Topics Concern     Parent/Sibling W/ Cabg, Mi Or Angioplasty Before 65f 55m? No     Social History Narrative     to  , 2 cats macrotabby , long hair black cat , Moved from [place] in 2015 due to husbands job. 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 Her blood pressure is 116/78 and her pulse is 85. Her oxygen saturation is 100%.   Her body mass index is 25.83 kg/(m^2).  Her body surface area is 1.71 meters squared.  There is no central or peripheral cyanosis. Pupils are reactive and sclera are not jaundiced. No JVD. There is no conjunctival injection or discharge. EOMI. Mucous membranes are moist and pink. Dentition appears healthy. Neck is supple with no thyroid enlargement.  Lungs are clear to ausculation bilaterally with no wheezes, rales or rhonchi. There is no increased work of breathing. Precordium is quiet with a normally placed apical impulse. On auscultation, heart sounds are regular with normal S1 and physiologically split S2. There are no murmurs, rubs or gallops.  Abdomen is soft and non-tender without masses or hepatomegaly. Femoral pulses are normal with no brachial femoral delay. Skin is without rashes, lesions, or significant bruising. Extremities are warm and well-perfused with no cyanosis, clubbing or edema. Peripheral pulses are normal and there is < 2 sec capillary refill. Patient is alert and oriented " and moves all extremities equally with normal tone.          Investigations and lab work:     12 Lead EKG performed today  shows normal sinus rhythm at a rate of 77 with normal intervals and no chamber enlargement or hypertrophy.    An echocardiogram performed today is notable for s/p device closure of secundum atrial septal defect with Amplatzer atrial septal occluder (1/24/2006). The device is well seated in the atrial septum. There is no obvious residual atrial level shunt, although tiny shunt can not be ruled out. The left and right ventricles have normal chamber size, wall thickness, and systolic function. The calculated single plane left ventricular ejection fraction from the 4 chamber view is 64 %. No pericardial effusion.         Assessment and Plan:     In summary, Ofelia is a 27 year old with:     1. History of ASD s/p repair, status post Amplatz septal occluder placed in 2006  2. Currently 15 weeks pregnant with first child  3. Palpitations  4. Family history of aortic rupture    Ofelia is doing well since her device closure.  Her echocardiogram today shows her device in good position with no impingement on her surrounding structures or AV valves with normal biventricular size and systolic function.  In regards to her pregnancy, I don't expect any complications or concerns associated with her pregnancy in regards to her ASD s/p device closure.  We will send for her records from the [facility].  She does not have any contraindications for routine delivery, other than routine obstetrical indications.  No indication for SBE prophylaxis for vaginal delivery.  We would recommend a fetal echocardiogram being performed between 18-24 weeks gestation after level 2 fetal ultrasound, which is scheduled later this month.      In regards to her palpitations, they are very infrequent, brief, and she does not experience any lightheadedness/dizziness.  As such, further evaluation with a rhythm monitor would  unlikely be low yield.  However, if she were to experience worsening of her symptoms then we would like to re-evaluate that given the potential risk for atrial arrhythmias following device closure.  Given that Ofelia's great-aunt experienced aortic aneurysm and rupture we would recommend a MR angiogram of the chest 6-12 months following delivery.     We counseled Ms Will on the need for a healthy diet, routine healthy dental care, and exercise going forwards.  We would like to she her back in  Clinic at 34 weeks gestation for repeat evaluation eith no scheduled testing, sooner if there are new concerns or cardiac symptoms.      Thank you for the opportunity to participate in the care of Ofelia Will . Please do not hesitate to call with questions or concerns.    Sincerely,  Braden Triplett, DO  Pediatric Cardiology Fellow  [facility], [facility].   Email: [email or web address]    ISacha MD, saw this patient with Dr. Triplett and agree with the findings and plan of care as documented in this  note.I have reviewed this patient's history, examined the patient and reviewed relevant laboratory findings and diagnostic testing. I have discussed the plan of care with the patient and family members who are present at the time of this visit.     Sacha Fernandez M.D.      of Pediatrics  Pediatric and Adult Congenital Cardiology  [facility]  Adult Congenital and [facility]      CC:   Guerda Esquivel    2.  CC  Patient Care Team:  Guerda Esquivel MD as PCP - General (Family Practice)  Sacha Fernandez MD as MD (Pediatrics)  Elio Singh RN as Nurse Coordinator (Cardiology)  GUERDA ESQUIVEL

## 2018-04-05 NOTE — PATIENT INSTRUCTIONS
Return in 4 weeks     Dr. Anupama Bardales,     Obstetrics and Gynecology  [facility]

## 2018-04-05 NOTE — MR AVS SNAPSHOT
"              After Visit Summary   4/5/2018    Ofelia Will    MRN: [id number]           Patient Information     Date Of Birth          1990        Visit Information        Provider Department      4/5/2018 10:00 AM Sacha Fernandez MD [facility]        Today's Diagnoses     H/O atrial septal defect repair    -  1      Care Instructions    You were seen today in the Adult Congenital and Cardiovascular Genetics Clinic at the [facility].    Cardiology Providers you saw during your visit:  Dr. Sacha Fernandez    Diagnosis:  ASD currently 15 weeks pregnant    Results:  The results of your echo was discussed with you today    Recommendations:    1.  Continue to eat a heart healthy, low salt diet.  2.  Continue to get 20-30 minutes of aerobic activity, 4-5 days per week.  Examples of aerobic activity include walking, running, swimming, cycling, etc.  3.  Continue to observe good oral hygiene, with regular dental visits.  4.  Please have a fetal echo at 19-20 weeks of gestation.  Please call [phone] to schedule.  5. Please wear compressions stocking.      Vitals:    04/05/18 0921   BP: 116/78   BP Location: Right arm   Pulse: 85   SpO2: 100%   Weight: 65.8 kg (145 lb 1 oz)   Height: 1.596 m (5' 2.84\")       Exercise restrictions:   Yes__X__  No____         If yes, list restrictions:  Must be allowed to rest if fatigued or SOB      Work restrictions:  Yes____  No__X__         If yes, list restrictions:      Follow-up:  Follow up with Dr. Fernandez at 34 weeks. ( August 2nd).    For after hours urgent needs, call [phone] and ask to speak to the Adult Congenital Physician on call.  Mention Job Code 0401.    For emergencies call 911.    For any scheduling needs and to contact your nurse in the Adult Congenital and Cardiovascular Genetics Clinic, please call Corky Singh, Procedure , at [phone].    Thank you for your visit today!  If you have questions " or concerns about today's visit, please call me.    Elio Singh, RN, BSN  Cardiology Care Coordinator  [facility]  [email or web address]  Ph.[phone]    [facility]  [facility]  Mail Code [id number]  [address] [place] [address]  [address]            Follow-ups after your visit        Your next 10 appointments already scheduled     Apr 09, 2018  4:00 PM CDT   (Arrive by 3:45 PM)   DISHA Chiropractor with ALANA Robb [place] [facility] (DISHA [place]  )    [address]  [address]  [place] [id number] [phone]            Apr 27, 2018  9:30 AM CDT   Genetic Counseling with  GEN COUNSELOR 2   [facility] Maternal Fetal Medicine [facility])    [address] [facility] [address]  [place] [id number] [phone]            Apr 27, 2018 10:15 AM CDT   (Arrive by 10:00 AM)   ALKA US COMP with [email or web address]   [facility] Maternal Fetal Medicine Ultrasound - [facility] ([facility])    [address] [facility] [address]  [facility] [address] [phone]           Wear comfortable clothes and leave your valuables at home.            Apr 27, 2018 10:45 AM CDT   Radiology MD with  ADRIENNE HOLDEN   [facility]etal [facility])    [address] [facility] [address]  [facility] [address] [phone]           Please arrive at the time given for your first appointment. This visit is used internally to schedule the physician's time during your ultrasound.            May 03, 2018  2:15 PM CDT   ESTABLISHED PRENATAL with Kelsi Jane MD   [facility] ([facility])    [address]  [facility] [address]   [phone]              Future tests that were ordered for you today     Open Future Orders        Priority Expected Expires Ordered    Echo pediatric congenital  "Routine  4/2/2019 4/2/2018            Who to contact     If you have questions or need follow up information about today's clinic visit or your schedule please contact [facility] directly at [phone].  Normal or non-critical lab and imaging results will be communicated to you by MyChart, letter or phone within 4 business days after the clinic has received the results. If you do not hear from us within 7 days, please contact the clinic through [employer] Inchart or phone. If you have a critical or abnormal lab result, we will notify you by phone as soon as possible.  Submit refill requests through [employer] or call your pharmacy and they will forward the refill request to us. Please allow 3 business days for your refill to be completed.          Additional Information About Your Visit        [employer] IncharENJORE Information     [employer] gives you secure access to your electronic health record. If you see a primary care provider, you can also send messages to your care team and make appointments. If you have questions, please call your primary care clinic.  If you do not have a primary care provider, please call [phone] and they will assist you.        Care EveryWhere ID     This is your Care EveryWhere ID. This could be used by other organizations to access your [place] medical records  [id number]        Your Vitals Were     Pulse Height Last Period Pulse Oximetry BMI (Body Mass Index)       85 1.596 m (5' 2.84\") 12/20/2017 100% 25.83 kg/m2        Blood Pressure from Last 3 Encounters:   04/05/18 116/78   04/05/18 114/60   03/05/18 118/62    Weight from Last 3 Encounters:   04/05/18 65.8 kg (145 lb 1 oz)   04/05/18 65.8 kg (145 lb)   03/05/18 66.2 kg (146 lb)              We Performed the Following     ELECTROCARDIOGRAM REPORT        Primary Care Provider Office Phone # Fax #    Guerda Esquivel [phone] [address] [facility] [address]        Equal Access to Services     " MAHIN STEARNS : Hadii aad lesly rigoberto Worthington, waaxda luqadaha, qaybta kaalmada skipaliyahjade, viki leonmicheletaldo camara . So [facility] [place] [facility] [phone].    ATENCIÓN: Si habla español, tiene a ferris disposición servicios gratuitos de asistencia lingüística. Llame al [phone].    We comply with applicable federal civil rights laws and [place] laws. We do not discriminate on the basis of race, color, national origin, age, disability, sex, sexual orientation, or gender identity.            Thank you!     Thank you for choosing [facility]  for your care. Our goal is always to provide you with excellent care. Hearing back from our patients is one way we can continue to improve our services. Please take a few minutes to complete the written survey that you may receive in the mail after your visit with us. Thank you!             Your Updated Medication List - Protect others around you: Learn how to safely use, store and throw away your medicines at www.disposemymeds.org.          This list is accurate as of 4/5/18 10:59 AM.  Always use your most recent med list.                   Brand Name Dispense Instructions for use Diagnosis    prenatal multivitamin plus iron 27-0.8 MG Tabs per tablet     100 tablet    Take 1 tablet by mouth daily    Supervision of normal first pregnancy, antepartum       ranitidine 75 MG tablet    ZANTAC    30 tablet    Take 2 tablets (150 mg) by mouth 2 times daily    Gastroesophageal reflux disease without esophagitis

## 2018-04-05 NOTE — NURSING NOTE
"Chief Complaint   Patient presents with     Prenatal Care       Initial /60  Wt 145 lb (65.8 kg)  LMP 2017  BMI 25.69 kg/m2 Estimated body mass index is 25.69 kg/(m^2) as calculated from the following:    Height as of 17: 5' 3\" (1.6 m).    Weight as of this encounter: 145 lb (65.8 kg).  Medication Reconciliation: complete       Less nausea      Tracee Barron CMA      "

## 2018-04-05 NOTE — PATIENT INSTRUCTIONS
"You were seen today in the Adult Congenital and Cardiovascular Genetics Clinic at the [facility].    Cardiology Providers you saw during your visit:  Dr. Sacha Fernandez    Diagnosis:  ASD currently 15 weeks pregnant    Results:  The results of your echo was discussed with you today    Recommendations:    1.  Continue to eat a heart healthy, low salt diet.  2.  Continue to get 20-30 minutes of aerobic activity, 4-5 days per week.  Examples of aerobic activity include walking, running, swimming, cycling, etc.  3.  Continue to observe good oral hygiene, with regular dental visits.  4.  Please have a fetal echo at 19-20 weeks of gestation.  Please call [phone] to schedule.  5. Please wear compressions stocking.      Vitals:    04/05/18 0921   BP: 116/78   BP Location: Right arm   Pulse: 85   SpO2: 100%   Weight: 65.8 kg (145 lb 1 oz)   Height: 1.596 m (5' 2.84\")       Exercise restrictions:   Yes__X__  No____         If yes, list restrictions:  Must be allowed to rest if fatigued or SOB      Work restrictions:  Yes____  No__X__         If yes, list restrictions:      Follow-up:  Follow up with Dr. Fernandez at 34 weeks. ( August 2nd).    For after hours urgent needs, call [phone] and ask to speak to the Adult Congenital Physician on call.  Mention Job Code 0401.    For emergencies call [phone].    For any scheduling needs and to contact your nurse in the Adult Congenital and Cardiovascular Genetics Clinic, please call Corky Singh Procedure , at [phone].    Thank you for your visit today!  If you have questions or concerns about today's visit, please call me.    Elio Singh, RN, BSN  Cardiology Care Coordinator  [facility]  [email or web address]  Ph.[phone]    [facility]  [facility]   Clinics and Surgery Center  Mail Code [id number]  [address] [place] [address], [place]  [address]    "

## 2018-04-05 NOTE — MR AVS SNAPSHOT
After Visit Summary   4/5/2018    Ofelia Will    MRN: [id number]           Patient Information     Date Of Birth          1990        Visit Information        Provider Department      4/5/2018 8:30 AM Aunpama Bardales,  [facility]        Today's Diagnoses     Prenatal care in second trimester    -  1      Care Instructions    Return in 4 weeks     Dr. Anupama Bardales, DO    Obstetrics and Gynecology  [facility] [place] [facility] [place]                 Follow-ups after your visit        Your next 10 appointments already scheduled     Apr 05, 2018  9:15 AM CDT   Return Visit with Rh Echo   [facility] ([facility])    [address]  [facility] [address]   [phone]            Apr 05, 2018  9:15 AM CDT   Ech Pediatric Congenital with [id number]   [facility] Cardiopulmonary ([facility])    [address]  [facility] [address]   [phone]            Apr 05, 2018 10:00 AM CDT   Return Visit with Sacha Fernandez MD   [facility] ([facility])    [address]  [facility] [address]   [phone]            Apr 09, 2018  4:00 PM CDT   (Arrive by 3:45 PM)   DISHA Chiropractor with ALANA Robb [place] [facility] ([facility] [place]  )    [address]  [address]  [facility] [address] [phone]            Apr 27, 2018  9:30 AM CDT   Genetic Counseling with RH GEN COUNSELOR 2   [facility] ([facility])    [address]  [facility] [address]   [phone]            Apr 27, 2018 10:15 AM CDT   (Arrive by 10:00 AM)   MFM US COMP with [email or web address]   [facility] M[facility] Fetal Medicine Ultrasound - [facility])    [address]  [facility] [address]   [phone]           Wear comfortable clothes and leave your  valuables at home.            Apr 27, 2018 10:45 AM CDT   Radiology MD with RH ADRIENNE HOLDEN   [facility] ([facility])    [address] [facility] [address]  [facility] [id number] [phone]           Please arrive at the time given for your first appointment. This visit is used internally to schedule the physician's time during your ultrasound.              Future tests that were ordered for you today     Open Future Orders        Priority Expected Expires Ordered    Echo pediatric congenital Routine  4/2/2019 4/2/2018            Who to contact     If you have questions or need follow up information about today's clinic visit or your schedule please contact [facility] directly at [phone].  Normal or non-critical lab and imaging results will be communicated to you by [employer], letter or phone within 4 business days after the clinic has received the results. If you do not hear from us within 7 days, please contact the clinic through [employer] or phone. If you have a critical or abnormal lab result, we will notify you by phone as soon as possible.  Submit refill requests through [employer] or call your pharmacy and they will forward the refill request to us. Please allow 3 business days for your refill to be completed.          Additional Information About Your Visit        [employer] Information     [employer] gives you secure access to your electronic health record. If you see a primary care provider, you can also send messages to your care team and make appointments. If you have questions, please call your primary care clinic.  If you do not have a primary care provider, please call [phone] and they will assist you.        Care EveryWhere ID     This is your Care EveryWhere ID. This could be used by other organizations to access your [place] medical records  [id number]        Your Vitals Were     Last Period BMI (Body Mass Index)                12/20/2017  25.69 kg/m2           Blood Pressure from Last 3 Encounters:   04/05/18 114/60   03/05/18 118/62   12/05/17 128/81    Weight from Last 3 Encounters:   04/05/18 145 lb (65.8 kg)   03/05/18 146 lb (66.2 kg)   12/05/17 144 lb (65.3 kg)              Today, you had the following     No orders found for display       Primary Care Provider Office Phone # Fax #    Guerda Esquivel [phone] [address]  [facility] [address]        Equal Access to Services     [facility] Hospital[facility] : Hadii cierra ku hadasho Soomaali, waaxda luqadaha, qaybta kaalmada sari, viki camara . So [facility] [phone].    ATENCIÓN: Si habla español, tiene a ferris disposición servicios gratuitos de asistencia lingüística. [facility] [phone].    We comply with applicable federal civil rights laws and [place] laws. We do not discriminate on the basis of race, color, national origin, age, disability, sex, sexual orientation, or gender identity.            Thank you!     Thank you for choosing [facility]  for your care. Our goal is always to provide you with excellent care. Hearing back from our patients is one way we can continue to improve our services. Please take a few minutes to complete the written survey that you may receive in the mail after your visit with us. Thank you!             Your Updated Medication List - Protect others around you: Learn how to safely use, store and throw away your medicines at www.disposemymeds.org.          This list is accurate as of 4/5/18  9:06 AM.  Always use your most recent med list.                   Brand Name Dispense Instructions for use Diagnosis    prenatal multivitamin plus iron 27-0.8 MG Tabs per tablet     100 tablet    Take 1 tablet by mouth daily    Supervision of normal first pregnancy, antepartum       ranitidine 75 MG tablet    ZANTAC    30 tablet    Take 2 tablets (150 mg) by mouth 2 times daily    Gastroesophageal reflux disease without  esophagitis

## 2018-04-09 ENCOUNTER — THERAPY VISIT (OUTPATIENT)
Dept: CHIROPRACTIC MEDICINE | Facility: CLINIC | Age: 28
End: 2018-04-09
Payer: COMMERCIAL

## 2018-04-09 DIAGNOSIS — M99.05 SOMATIC DYSFUNCTION OF PELVIS REGION: ICD-10-CM

## 2018-04-09 DIAGNOSIS — M99.03 SOMATIC DYSFUNCTION OF LUMBAR REGION: ICD-10-CM

## 2018-04-09 DIAGNOSIS — M54.41 ACUTE RIGHT-SIDED LOW BACK PAIN WITH RIGHT-SIDED SCIATICA: Primary | ICD-10-CM

## 2018-04-09 PROCEDURE — 97112 NEUROMUSCULAR REEDUCATION: CPT | Mod: 59 | Performed by: CHIROPRACTOR

## 2018-04-09 PROCEDURE — 98940 CHIROPRACT MANJ 1-2 REGIONS: CPT | Mod: AT | Performed by: CHIROPRACTOR

## 2018-04-09 PROCEDURE — 99203 OFFICE O/P NEW LOW 30 MIN: CPT | Mod: 25 | Performed by: CHIROPRACTOR

## 2018-04-09 NOTE — PROGRESS NOTES
Initial Chiropractic Clinic Visit    PCP: Guerda Esquivel    Ofelia Will is a 27 year old female who is seen  in consultation at the request of  Kaila Bacon M.D. presenting with low back pain Patient reports that the onset was January 2018 for unknown reasons. Patient reports she is 15 weeks pregnant and this might be contributing. Patient reports she also has to stand all day for her work.  When asked, patient denies:, falling, slipping, bending and reaching or sleeping awkwardly.  Prior to onset, the patient was able to sit 2 hours, stand 6 hours without LBP. Patient notes that due to symptoms, they can only sit 1/2 hour, stand 2 hours without increasing LBP. Ofelia Will notes   sitting rated at a 3/10 painful, difficult and prior to this incident it was 0/10.        Injury: No trauma or injury    Location of Pain: lower lower lumbar spine, Sacral iliac joints at the following level(s) L5  and PSIS Right   Duration of Pain: Since January 2018   Rating of Pain at worst: 5/10  Rating of Pain Currently: 2/10  Symptoms are better with: Rest  Symptoms are worse with: prolonged sitting or standing, bending and lifting  Additional Features: Pain at times radiates to the right upper thigh     Health History  as reported by the patient:    How does the patient rate their own health:   Excellent    Current or past medical history:   Currently pregnant    Medical allergies  None    Past Traumas/Surgeries  None    Family History  This patient has no significant family history    Medications:  other:  Zantac    Occupation:  Teller    Primary job tasks:   Lifting/carrying and Prolonged standing    Barriers as home/work:   none    Additional health Issues:     NA        Review of Systems  Musculoskeletal: as above  Remainder of review of systems is negative including constitutional, CV, pulmonary, GI, Skin and Neurologic except as noted in HPI or medical history.    Past Medical History:   Diagnosis Date     ASD  "(atrial septal defect)     s/p surgical closer age 11 to 13 in [place], asymptomatic several follow ups , last one 2010 neg no further follow up      History of anemia     treatred with iron      History of depression 2014    resolved     History of vitamin D deficiency      Initiation of Depo Provera 2014     Left otitis media 2014    treated iwth augmentin     Past Surgical History:   Procedure Laterality Date     REPAIR ATRIAL SEPTAL DEFECT      at age 11 to 13      Objective  LMP 12/20/2017      GENERAL APPEARANCE: healthy, alert and no distress   GAIT: NORMAL  SKIN: no suspicious lesions or rashes  NEURO: Normal strength and tone, mentation intact and speech normal  PSYCH:  mentation appears normal and affect normal/bright    Low back exam:    Inspection:  \"     no visible deformity in the low back       normal skin\",    ROM:       full flexion       limited extension due to pain    Tender:       paraspinal muscles    Non Tender:       remainder of lumbar spine    Strength:       ankle dorsiflexion 5/5 Normal       ankle plantarflexion 5/5 Normal       dorsiflexion of the great toe 5/5 Normal    Reflexes:       patellar (L3, L4) 2 bilaterally       achilles tendons (S1) 2 bilaterally    Sensation:      grossly intact throughout lower extremities    Special tests:  Kemps - Right negative and Left negative, SLR - Right negative and Left negative, Slump - Right negative and Left negative and Fabere - Right positive, produced LBP and Left positive, produced LBP    Segmental spinal dysfunction/restrictions found at::  L5 Right rotation restricted  Sacrum Right rotation restricted  PSIS Right Right rotation restricted.    The following soft tissue hypotonicities were observed:Quad lumb: right, referred pain: no    Trigger points were found in:Lumbar erector spine    Muscle spasm found in:Lumbar erector spine and Quad lumb      Radiology:  none    Assessment:    No diagnosis found.    RX ordered/plan of " care  Anticipated outcomes  Possible risks and side effects    After discussing the risk and benefits of care, patient consented to treatment    Prognosis: Good      Patient's condition:  Patient had restrictions pre-manipulation    Treatment effectiveness:  Post manipulation there is better intersegmental movement and Patient claims to feel looser post manipulation      Plan:    Procedures:  Evaluation and Management:  93769 Moderate level exam 30 min    CMT:  67087 Chiropractic manipulative treatment 1-2 regions performed   Lumbar: Diversified, L5, Side posture  Pelvis: Diversified, Sacrum , PSIS Right , Side posture    Modalities:  None performed this visit    Therapeutic procedures:  03752: Neuromuscular Re-Education  The following were demonstrated and practiced: Glute sandrine retraining, postural strengthening exercises-patient preformed showed good understanding of proper sitting, standing postures. Patient was able to recruit glute muscles on bridging and in standing. This procedure was done to develop and retrain the neuromuscular connection with core/glute muscles. Spent 10-12 minutes on this procedure.     Response to Treatment  Patient tolerated the treatment well today.      Treatment plan and goals:  Goals:  SITTING: the patient specific goal is to attain pre-injury status of  2 hours comfortably  PAIN: the patient specific goal of thier symptoms is to attain the pre-inury state of 0/10 on the VAS    Frequency of care  Duration of care is estimated to be 6 weeks, from the initial treatment.  It is estimated that the patient will need a total of 2-4 visits to resolve this episode.  For the initial therapeutic trial of care, the frequency is recommended at 1-2 X a month, once daily.  A reevaluation would be clinically appropriate in 6 visits, to determine progress and further course of care.    In-Office Treatment  Evaluation  06707 Chiropractic manipulative treatment 1-2 regions performed   Lumbar:  Diversified, L5, Side posture  Pelvis: Diversified, Sacrum , PSIS Right , Side posture    Modalities:  None performed this visit    Therapeutic procedures:  32469: Neuromuscular Re-Education  The following were demonstrated and practiced: Glute sandrine retraining, postural strengthening exercises-patient preformed showed good understanding of proper sitting, standing postures. Patient was able to recruit glute muscles on bridging and in standing. This procedure was done to develop and retrain the neuromuscular connection with core/glute muscles. Spent 10-12 minutes on this procedure.     Recommendations:    Instructions:mindful of good sitting and standing postures.    Follow-up:    In 1-2 weeks if needed      Discussed the assessment with the patient.      Disclaimer: This note consists of symbols derived from keyboarding, dictation and/or voice recognition software. As a result, there may be errors in the script that have gone undetected. Please consider this when interpreting information found in this chart.

## 2018-04-20 ENCOUNTER — PRE VISIT (OUTPATIENT)
Dept: MATERNAL FETAL MEDICINE | Facility: CLINIC | Age: 28
End: 2018-04-20

## 2018-04-27 ENCOUNTER — OFFICE VISIT (OUTPATIENT)
Dept: MATERNAL FETAL MEDICINE | Facility: CLINIC | Age: 28
End: 2018-04-27
Attending: OBSTETRICS & GYNECOLOGY
Payer: COMMERCIAL

## 2018-04-27 ENCOUNTER — HOSPITAL ENCOUNTER (OUTPATIENT)
Dept: ULTRASOUND IMAGING | Facility: CLINIC | Age: 28
Discharge: HOME | End: 2018-04-27
Attending: OBSTETRICS & GYNECOLOGY | Admitting: OBSTETRICS & GYNECOLOGY
Payer: COMMERCIAL

## 2018-04-27 DIAGNOSIS — Z14.1 CYSTIC FIBROSIS CARRIER IN SECOND TRIMESTER, ANTEPARTUM: ICD-10-CM

## 2018-04-27 DIAGNOSIS — Z87.74 HISTORY OF REPAIR OF ATRIAL SEPTAL DEFECT: Primary | ICD-10-CM

## 2018-04-27 DIAGNOSIS — O26.90 PREGNANCY RELATED CONDITION, ANTEPARTUM: ICD-10-CM

## 2018-04-27 DIAGNOSIS — O09.892 CYSTIC FIBROSIS CARRIER IN SECOND TRIMESTER, ANTEPARTUM: ICD-10-CM

## 2018-04-27 DIAGNOSIS — Z82.79 FAMILY HISTORY OF CONGENITAL CARDIAC SEPTAL DEFECT: Primary | ICD-10-CM

## 2018-04-27 PROCEDURE — 76811 OB US DETAILED SNGL FETUS: CPT

## 2018-04-27 PROCEDURE — 96040 ZZH GENETIC COUNSELING, EACH 30 MINUTES: CPT | Mod: ZF | Performed by: GENETIC COUNSELOR, MS

## 2018-04-27 NOTE — MR AVS SNAPSHOT
After Visit Summary   4/27/2018    Ofelia Will    MRN: [id number]           Patient Information     Date Of Birth          1990        Visit Information        Provider Department      4/27/2018 10:45 AM Valentine Chen,  [facility] Maternal Fetal Medicine [facility]        Today's Diagnoses     Family history of congenital cardiac septal defect    -  1       Follow-ups after your visit        Your next 10 appointments already scheduled     Apr 30, 2018  3:45 PM CDT   DISHA Chiropractor with Yu Wang DC   [facility] [place] [facility]    [address]  [address]  [facility] [facility] [address]   [phone]            May 03, 2018  2:15 PM CDT   ESTABLISHED PRENATAL with Kelsi Jane MD   [facility] ([facility])    [address]  [place] [address]   [phone]            May 18, 2018  1:30 PM CDT   (Arrive by 1:15 PM)   MFM READ SCREEN FETAL EHCO Weinstein with [id number]   [facility] Maternal Fetal Medicine Ultrasound - [facility] ([facility])    [address]  [place] [address]   [phone]            May 18, 2018  2:00 PM CDT   Radiology MD with  ADRIENNE HOLDEN   [facility] Maternal Fetal Medicine [facility] ([facility])    [address]  [place] [facility] [address]   [phone]           Please arrive at the time given for your first appointment. This visit is used internally to schedule the physician's time during your ultrasound.            Aug 02, 2018  9:00 AM CDT   Return Visit with Sacha Fernandez MD   [facility] ([facility])    [address]  [place] [facility] [address]   [phone]              Future tests that were ordered for you today     Open Future Orders        Priority Expected Expires Ordered    MFM Read Screen Fetal Echo Single Routine  2/27/2019 4/27/2018            Who to contact     If you  have questions or need follow up information about today's clinic visit or your schedule please contact [facility] directly at [phone].  Normal or non-critical lab and imaging results will be communicated to you by MyChart, letter or phone within 4 business days after the clinic has received the results. If you do not hear from us within 7 days, please contact the clinic through [employer] or phone. If you have a critical or abnormal lab result, we will notify you by phone as soon as possible.  Submit refill requests through [facility] [employer] or call your pharmacy and they will forward the refill request to us. Please allow 3 business days for your refill to be completed.          Additional Information About Your Visit        [facility] Information     [facility] gives you secure access to your electronic health record. If you see a primary care provider, you can also send messages to your care team and make appointments. If you have questions, please call your primary care clinic.  If you do not have a primary care provider, please call [phone] and they will assist you.        Care EveryWhere ID     This is your Care EveryWhere ID. This could be used by other organizations to access your [place] medical records  [id number]        Your Vitals Were     Last Period                   12/20/2017            Blood Pressure from Last 3 Encounters:   04/05/18 116/78   04/05/18 114/60   03/05/18 118/62    Weight from Last 3 Encounters:   04/05/18 65.8 kg (145 lb 1 oz)   04/05/18 65.8 kg (145 lb)   03/05/18 66.2 kg (146 lb)               Primary Care Provider Office Phone # Fax #    Guerda Esquivel [phone] [address] [facility] [address]        Equal Access to Services     DONTE [facility] : Frida Worthington, martínez stokes, viki benitez. So [facility] [phone].    ATENCIÓN: Si tyler martinez ferris  disposición servicios gratuitos de asistencia lingüística. Joaquim syed [phone].    We comply with applicable federal civil rights laws and [place] laws. We do not discriminate on the basis of race, color, national origin, age, disability, sex, sexual orientation, or gender identity.            Thank you!     Thank you for choosing [facility]  for your care. Our goal is always to provide you with excellent care. Hearing back from our patients is one way we can continue to improve our services. Please take a few minutes to complete the written survey that you may receive in the mail after your visit with us. Thank you!             Your Updated Medication List - Protect others around you: Learn how to safely use, store and throw away your medicines at www.disposemymeds.org.          This list is accurate as of 4/27/18 11:39 AM.  Always use your most recent med list.                   Brand Name Dispense Instructions for use Diagnosis    prenatal multivitamin plus iron 27-0.8 MG Tabs per tablet     100 tablet    Take 1 tablet by mouth daily    Supervision of normal first pregnancy, antepartum       ranitidine 75 MG tablet    ZANTAC    30 tablet    Take 2 tablets (150 mg) by mouth 2 times daily    Gastroesophageal reflux disease without esophagitis

## 2018-04-27 NOTE — PROGRESS NOTES
[facility] [place] 2[place] Fetal Medicine [place]  Genetic Counseling Consult    Patient: Ofelia Will YOB: 1990   Date of Service: April 27, 2018 Referring Provider:  Dr. Kaila Bacon     Ofelia Will was seen at [facility] [place] Fetal [facility] for genetic consultation to discuss risks related to 1) her personal history of an ASD, and 2) her cystic fibrosis carrier status.  She was accompanied to clinic today by her  and a friend.       Summary/Plan:     1.   Prior to today's ultrasound, I met with Ofelia to review pregnancy and family history.   We also reviewed her previous screening/testing results and talked about what additional information might be provided by today's ultrasound.  See details below.    2.  In particular, we talked about Ofelia's history of and apparently isolated ASD.  We reviewed the multifactorial inheritance associated with most isolated heart defects and that this history likely somewhat raises the likelihood of heart defect in this pregnancy, although the overall risk of a heart defect is likely still low (~2-3%).  However, because this risk is increased over the general population, we talked about that in addition to today's planned level II ultrasound, a fetal echocardiogram is recommended at 20-22 weeks gestation.  This has been scheduled for May 18, 2016.    3.  Additionally, we reviewed Ofelia's previous carrier screening results, which showed that she is a carrier of cystic fibrosis.  We talked about that this information (and the fact that her  is of [place]  ancestry) would result in an estimated risk of cystic fibrosis in this fetus of about 1 in 100.  (1 x 1/25 x 1/4).  We talked about that cystic fibrosis carrier screening in her  could provide more information to better define this risk.  Ofelia's  declines cystic fibrosis carrier screening at this time, but they report that they might consider carrier  "screening in him prior to another pregnancy.    4.  After our conversation, Ofelia had a comprehensive (level II) ultrasound today.  Please see the ultrasound report for further details.    Pregnancy History:   /Parity:      Age at Delivery: 28 year old  ZHANG: 2018, by Last Menstrual Period  Gestational Age: 18w2d    Ofelia reports that she takes prenatal vitamins and Zantac.      Ofelia reports that she has two cats at home, but she is familiar with the precautions regarding the litter box.      Per her prenatal record, Ofelia had an 11 week ultrasound that showed fetal growth consistent with her LMP.  Ofelia reports that a possible subchorionic hemorrhage was noted on that ultrasound, but she states that she has not had any vaginal bleeding.    Per her prenatal record, Ofelia had an NIPT analysis through her primary prenatal care provider.  This test has primarily been validated in woman at increased risk for a chromosome problem in a pregnancy (e.g. abnormal screening results, abnormal ultrasound findings, advanced maternal age).  There is more limited data published in the scientific literature about the performance of this test in low-risk women.  However, this normal result would be expected to indicate a decreased risk of these chromosome abnormalities in this pregnancy.     Medical History:       Ofelia reports that she was born with an ASD that was surgically repaired when she was in 7th grade.  She recently was seen by cardiology, and Dr. Fernandez's report notes that Ofelia's surgical treatment involved an Amplatz septal occluder placed in  at the [facility]; Dr. Fernandez's note comments that they were going to try to obtain records to confirm this information.       Dr. Fernandez's note commented that Ofelia's echocardiogram earlier this month \"shows her device in good position with no impingement on her surrounding structures or AV valves with normal biventricular size and systolic " "function\" and that her EKG was normal.        Dr. Fernandez's note also commented that she \"didn't expect any complications or concerns associated with her pregnancy in regards to her ASD s/p device closure\" and that Ofelia \"does not have any contraindications for routine delivery, other than routine obstetrical indications.\"      See cardiology notes in [facility] for more details.       Family History:     A four-generation pedigree was obtained and will be scanned in under the  Media  tab in EPIC. The following significant findings were reported by Ofelia and her :      Family history of heart defect:  As noted above, Ofelia is reported to have been born with an ASD.  Ofelia was not aware of any other physical birth defects, significant medical issues, or developmental concerns in herself; she was not aware of any other definitive family history of other structural cardiac abnormality. Ofelia reports that she is not aware of a concern about a specific genetic condition being associated with her heart defect.      We talked about that most heart defects occur as an isolated birth defect, meaning that most commonly, they are not part of an underlying genetic syndrome or condition.  We talked about that most heart defects are thought to have a \"multifactorial inheritance\" pattern, meaning that there are likely several genetic and/or environmental factors that have to come together in order to produce that birth defect.  We talked about that for most types of birth defects, we don't know those exact risk factors.      We talked about because some of the multifactorial risk factors for heart defects likely include some genetic risk factors, her history of being born with a heart defect does likely raise the risk of a heart defect in this pregnancy.  We talked about that this risk has been estimated to be about 2-3%.  Because of this, we talked about that in addition to today's level II ultrasound, a fetal echocardiogram " "would also be recommended.      Cystic fibrosis carrier status:  As part of her earlier prenatal lab testing, Ofelia had a \"Preparent\" carrier screen through [employer], which was negative for spinal muscular atrophy and Fragile X syndrome; these negative results likely reduce her risk of having a child with those conditions.  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  We reviewed these carrier screening results today.      We reviewed the autosomal recessive inheritance pattern associated with cystic fibrosis.  We talked about that in the vast majority of cases, both parents must be a carrier of cystic fibrosis, in order for them to be at risk for having a child with cystic fibrosis.  We talked about that if two parents are carriers of cystic fibrosis, we would expect each pregnancy they have together to be at a 25% chance to be affected with cystic fibrosis, a 50% chance of being a carrier of cystic fibrosis, and a 25% chance of not having cystic fibrosis and also not being a carrier.    We also briefly talked about the CFTR gene that is associated with cystic fibrosis.  We briefly talked about the function of the protein made from this gene and how it is thought to relate to the symptoms of cystic fibrosis.  We reviewed the common symptoms of cystic fibrosis.    Ofelia's  reports that he is of northern  ancestry, and we talked about that about 1 in 25 individuals of northern  ancestry is a carrier of cystic fibrosis.  We talked about that Ofelia's cystic fibrosis carrier screening results increase the risk of cystic fibrosis in this pregnancy to about 1 in 100 (1 x 1/25 x 1/4).  We talked about that cystic fibrosis carrier screening in her  could provide more information to better define this risk.      We spent some time today talking about the reasons that a couple might " "find additional carrier screening helpful in this situation.  Ofelia stated that she wasn't sure that at this stage of pregnancy, she would want to know if her  is a carrier, \"because then she would just worry about the possibility of cystic fibrosis in her pregnancy,\" and she does not think that she would consider an amniocentesis at this point to perform definitive testing, if it turned out that she and her  are both carriers.  Because of this, Ofelia's  declines cystic fibrosis carrier screening at this time.      They stated that they are not sure if information about Ofelia's 's carrier status would be important to them in reproductive planning.  Her  states that maybe he would want more information before another pregnancy, and so they report that they might consider screening prior to another pregnancy.      Family history physical disabilities: Ofelia reports that her maternal aunt has a son that has physical disabilities that have resulted in him needing to use a wheelchair.  She reports that she thought that this cousin had a \"heart problem\" at about 1 month of age that may have decreased oxygen levels or somehow else caused this physical issue.  She didn't know the details about this issue, and so it's hard to be specific about possible risks to other family members regarding this history.  She does report that this aunt has 3 other sons who are healthy and do not have similar issues.      Family history of question of a connective tissue disorder:  Ofelia's  reports that when he was in about 5th or 6th grade, he was evaluated for the possibility of Marfan syndrome based on his \"body measurements\" and that something about his heart had measured \"slightly oversized\"; he couldn't remember what part of the heart they were measuring, but he thought it might have been a valve.  He reports that at that time, it was thought that he did not meet the diagnostic criteria " "for Marfan syndrome.  He reported that it was not recommended that he follow-up with cardiology.  He does report that he also has a history of a surgery for a \"droopy eyelid.\"  He reports that others on his father's side of the family have similar body proportions to himself.  He reports that his paternal grandfather  in his 50s-60s; Ofelia reports that she thinks this may have been a brain aneurysm, but Ofelia's  wasn't sure about this.    We talked about that Marfan syndrome and other types of connective tissue diseases can be difficult to diagnose, particularly since not all the genes involved with connective tissue disorders are known, and so genetic testing for these conditions in not fully definitive/informative in all cases.   Ofelia's  reported that he has not had any imaging of his heart since that initial evaluation, and we talked about that he might want to consider getting more information about this and talking with his doctor about a follow-up echocardiogram.      Family history of possible metabolic disorder:  Ofelia's  reports that he has a maternal aunt that has a son with a disorder in which he \"can't process sugar,\" and therefore, has a special diet he follows.  We talked about that this could be consistent with a genetic metabolic disease, and Ofelia's  reports that he thinks his cousin's problem may \"have something to do with enzymes,\" but he couldn't remember the name of this condition.  We talked about that it is hard to be specific about risks related to this history without knowing more about the specific condition in his cousin.      Family history of Down syndrome:  Ofelia's  reports that he had a paternal uncle that had Down syndrome.  We talked about that the vast majority of Down syndrome results from a sporadic mistake in the formation of sperm or egg that went on to lead to that child. Ofelia's  was not aware of any familial chromosome " rearrangement that would increase the risk of a chromosome problem in this pregnancy.    Otherwise, the reported family history is negative for multiple miscarriages, stillbirths, and consanguinity.         Testing Options:     We reviewed the benefits and limitations of screening and diagnostic tests.  There is no screening nor diagnostic test that can detect all forms of birth defects or mental disability.      We discussed the following screening and testing options:     Comprehensive (Level II) ultrasound  - This detailed ultrasound is usually performed between 18-22 weeks gestation to screen for major birth defects.  - It also screens for ultrasound markers that might increase the risk for a chromosome problem in a pregnancy.    Fetal echocardiogram   -This is a detailed ultrasound generally done at 20 weeks or later to look specifically at a baby's heart anatomy in more detail by an ultrasonographer trained to do this type of evaluation.    - For families who are expecting a baby with a suspected heart defect,  a fetal echocardiogram is generally performed at our institution by a echocardiogram technician and reviewed by a pediatric cardiologist.    Face-to-face time of the genetic counseling consult was 45 minutes.    Elsi Iyer MS, [facility]  Genetic Counselor  Ph: [phone]  Pager: [phone]

## 2018-04-27 NOTE — MR AVS SNAPSHOT
After Visit Summary   4/27/2018    Ofelia Will    MRN: [id number]           Patient Information     Date Of Birth          1990        Visit Information        Provider Department      4/27/2018 9:30 AM Myrna Iyer GC [facility] Maternal Fetal Medicine [facility]        Today's Diagnoses     History of repair of atrial septal defect    -  1    Pregnancy related condition, antepartum        Cystic fibrosis carrier in second trimester, antepartum           Follow-ups after your visit        Your next 10 appointments already scheduled     Apr 30, 2018  3:45 PM CDT   DISHA Chiropractor with ALANA Robb RS [place] [facility]    [address]  [address]  [place] [facility] [address]   [phone]            May 03, 2018  2:15 PM CDT   ESTABLISHED PRENATAL with Kelsi Jane MD   [facility] ([facility])    [address]  [facility] [address]   [phone]            May 18, 2018  1:30 PM CDT   (Arrive by 1:15 PM)   ALKA READ SCREEN FETAL EHCO Weinstein with [id number]   [facility] Maternal Fetal Medicine Ultrasound - [facility] ([facility])    [address]  [facility] [address]   [phone]            May 18, 2018  2:00 PM CDT   Radiology MD with  ADRIENNE HOLDEN   [facility] Maternal Fetal Medicine [facility] ([facility])    [address]  [facility] [id number] [phone]           Please arrive at the time given for your first appointment. This visit is used internally to schedule the physician's time during your ultrasound.            Aug 02, 2018  9:00 AM CDT   Return Visit with Sacha Fernandez MD   [facility] ([facility])    [address]  [facility] [id number] [phone]              Future tests that were ordered for you today     Open Future Orders        Priority Expected Expires Ordered     [facility] Read Screen Fetal Echo Single Routine  2/27/2019 4/27/2018            Who to contact     If you have questions or need follow up information about today's clinic visit or your schedule please contact [facility] MATERNAL FETAL MEDICINE - [facility] directly at [phone].  Normal or non-critical lab and imaging results will be communicated to you by MyChart, letter or phone within 4 business days after the clinic has received the results. If you do not hear from us within 7 days, please contact the clinic through MOVE Guideshart or phone. If you have a critical or abnormal lab result, we will notify you by phone as soon as possible.  Submit refill requests through InforSense or call your pharmacy and they will forward the refill request to us. Please allow 3 business days for your refill to be completed.          Additional Information About Your Visit        MOVE Guideshart Information     InforSense gives you secure access to your electronic health record. If you see a primary care provider, you can also send messages to your care team and make appointments. If you have questions, please call your primary care clinic.  If you do not have a primary care provider, please call [phone] and they will assist you.        Care EveryWhere ID     This is your Care EveryWhere ID. This could be used by other organizations to access your [place] medical records  [id number]        Your Vitals Were     Last Period                   12/20/2017            Blood Pressure from Last 3 Encounters:   04/05/18 116/78   04/05/18 114/60   03/05/18 118/62    Weight from Last 3 Encounters:   04/05/18 65.8 kg (145 lb 1 oz)   04/05/18 65.8 kg (145 lb)   03/05/18 66.2 kg (146 lb)              We Performed the Following     [facility] Genetic Counseling        Primary Care Provider Office Phone # Fax #    Guerda Esquivel [phone] [address] [facility] [address]        Equal Access to Services     MAHIN STEARNS AH: Frida franklin  Elin, wagersonjade godfreyelbert, negro kapatrick guo, viki arambula carolynsara lamaxwellsudhakar carlos. So [facility] [phone].    ATENCIÓN: Si uzairla beba, tiene a ferris disposición servicios gratuitos de asistencia lingüística. Joaquim al [phone].    We comply with applicable federal civil rights laws and [place] laws. We do not discriminate on the basis of race, color, national origin, age, disability, sex, sexual orientation, or gender identity.            Thank you!     Thank you for choosing [facility]  for your care. Our goal is always to provide you with excellent care. Hearing back from our patients is one way we can continue to improve our services. Please take a few minutes to complete the written survey that you may receive in the mail after your visit with us. Thank you!             Your Updated Medication List - Protect others around you: Learn how to safely use, store and throw away your medicines at www.disposemymeds.org.          This list is accurate as of 4/27/18  4:26 PM.  Always use your most recent med list.                   Brand Name Dispense Instructions for use Diagnosis    prenatal multivitamin plus iron 27-0.8 MG Tabs per tablet     100 tablet    Take 1 tablet by mouth daily    Supervision of normal first pregnancy, antepartum       ranitidine 75 MG tablet    ZANTAC    30 tablet    Take 2 tablets (150 mg) by mouth 2 times daily    Gastroesophageal reflux disease without esophagitis

## 2018-04-27 NOTE — PROGRESS NOTES
"Please see \"Imaging\" tab under \"Chart Review\" for details of today's US.      Valentine Chen, DO  Maternal-Fetal Medicine        "

## 2018-05-03 ENCOUNTER — PRENATAL OFFICE VISIT (OUTPATIENT)
Dept: OBGYN | Facility: CLINIC | Age: 28
End: 2018-05-03
Payer: COMMERCIAL

## 2018-05-03 VITALS
HEART RATE: 64 BPM | SYSTOLIC BLOOD PRESSURE: 102 MMHG | BODY MASS INDEX: 26.53 KG/M2 | WEIGHT: 149 LBS | DIASTOLIC BLOOD PRESSURE: 58 MMHG

## 2018-05-03 DIAGNOSIS — Z87.74 H/O ATRIAL SEPTAL DEFECT REPAIR: Primary | ICD-10-CM

## 2018-05-03 DIAGNOSIS — Z14.1 CYSTIC FIBROSIS CARRIER: ICD-10-CM

## 2018-05-03 DIAGNOSIS — Z34.02 ENCOUNTER FOR SUPERVISION OF NORMAL FIRST PREGNANCY IN SECOND TRIMESTER: ICD-10-CM

## 2018-05-03 PROCEDURE — 99207 ZZC PRENATAL VISIT: CPT | Performed by: OBSTETRICS & GYNECOLOGY

## 2018-05-03 NOTE — MR AVS SNAPSHOT
After Visit Summary   5/3/2018    Ofelia Will    MRN: [id number]           Patient Information     Date Of Birth          1990        Visit Information        Provider Department      5/3/2018 2:15 PM Kelsi Jane MD [facility]        Today's Diagnoses     H/O atrial septal defect repair    -  1    Cystic fibrosis carrier        Encounter for supervision of normal first pregnancy in second trimester           Follow-ups after your visit        Your next 10 appointments already scheduled     May 18, 2018  1:30 PM CDT   MFM READ SCREEN FETAL EHCO Weinstein with [id number]   [facility] Maternal Fetal Medicine Ultrasound - [facility])    [address]  [facility] [address]   [phone]            May 18, 2018  2:00 PM CDT   Radiology MD with RH MFALKA HOLDEN   [facility] Maternal Fetal Medicine [facility])    [address]  [facility] [address] [phone]           Please arrive at the time given for your first appointment. This visit is used internally to schedule the physician's time during your ultrasound.            May 31, 2018  9:45 AM CDT   ESTABLISHED PRENATAL with Kelsi Jane MD   [facility] ([facility])    [address]  [facility] [address]   [phone]            Jun 26, 2018  3:45 PM CDT   ESTABLISHED PRENATAL with Kelsi Jane MD   [facility] ([facility])    [address]  [facility] [address]   [phone]            Jul 24, 2018  3:45 PM CDT   ESTABLISHED PRENATAL with Kelsi Jane MD   [facility] ([facility])    [address]  [facility] [address] [phone]              Who to contact     If you have questions or need follow up information about today's clinic visit or your schedule please contact [facility]  [facility] directly at [phone].  Normal or non-critical lab and imaging results will be communicated to you by MyChart, letter or phone within 4 business days after the clinic has received the results. If you do not hear from us within 7 days, please contact the clinic through SWK Technologieshart or phone. If you have a critical or abnormal lab result, we will notify you by phone as soon as possible.  Submit refill requests through Avalara or call your pharmacy and they will forward the refill request to us. Please allow 3 business days for your refill to be completed.          Additional Information About Your Visit        SWK Technologieshart Information     Avalara gives you secure access to your electronic health record. If you see a primary care provider, you can also send messages to your care team and make appointments. If you have questions, please call your primary care clinic.  If you do not have a primary care provider, please call [phone] and they will assist you.        Care EveryWhere ID     This is your Care EveryWhere ID. This could be used by other organizations to access your [place] medical records  [id number]        Your Vitals Were     Pulse Last Period Breastfeeding? BMI (Body Mass Index)          64 12/20/2017 No 26.53 kg/m2         Blood Pressure from Last 3 Encounters:   05/03/18 102/58   04/05/18 116/78   04/05/18 114/60    Weight from Last 3 Encounters:   05/03/18 149 lb (67.6 kg)   04/05/18 145 lb 1 oz (65.8 kg)   04/05/18 145 lb (65.8 kg)              Today, you had the following     No orders found for display       Primary Care Provider Office Phone # Fax #    Guerda Esquivel [phone] [address]  [facility] [address]        Equal Access to Services     DONTE [facility] : Hadpro Worthington, martínez stokes, viki benitez. So [facility] [phone].    ATENCIÓN: Si habla español, tiene a ferris disposición servicios  kashif de asistencia lingüística. Joaquim syed [phone].    We comply with applicable federal civil rights laws and [place] laws. We do not discriminate on the basis of race, color, national origin, age, disability, sex, sexual orientation, or gender identity.            Thank you!     Thank you for choosing [facility]  for your care. Our goal is always to provide you with excellent care. Hearing back from our patients is one way we can continue to improve our services. Please take a few minutes to complete the written survey that you may receive in the mail after your visit with us. Thank you!             Your Updated Medication List - Protect others around you: Learn how to safely use, store and throw away your medicines at www.disposemymeds.org.          This list is accurate as of 5/3/18 11:59 PM.  Always use your most recent med list.                   Brand Name Dispense Instructions for use Diagnosis    prenatal multivitamin plus iron 27-0.8 MG Tabs per tablet     100 tablet    Take 1 tablet by mouth daily    Supervision of normal first pregnancy, antepartum       ranitidine 75 MG tablet    ZANTAC    30 tablet    Take 2 tablets (150 mg) by mouth 2 times daily    Gastroesophageal reflux disease without esophagitis

## 2018-05-09 NOTE — PROGRESS NOTES
PROBLEM LIST  LABS:Opos/RNI  GIRL    1. Cf carrier: partner declined testing.  2. Hx of atrial septal defect, status post repair. Plan: fetal echo in [facility] (scheduled for 5/18)  3. Rubella nonimmune: MMR after delivery.    Doing well, plans echo in [facility] 5/18, normal level II US,

## 2018-05-18 ENCOUNTER — OFFICE VISIT (OUTPATIENT)
Dept: MATERNAL FETAL MEDICINE | Facility: CLINIC | Age: 28
End: 2018-05-18
Attending: OBSTETRICS & GYNECOLOGY
Payer: COMMERCIAL

## 2018-05-18 ENCOUNTER — HOSPITAL ENCOUNTER (OUTPATIENT)
Dept: ULTRASOUND IMAGING | Facility: CLINIC | Age: 28
Discharge: HOME | End: 2018-05-18
Attending: OBSTETRICS & GYNECOLOGY | Admitting: OBSTETRICS & GYNECOLOGY
Payer: COMMERCIAL

## 2018-05-18 DIAGNOSIS — Z82.79 FAMILY HISTORY OF CONGENITAL CARDIAC SEPTAL DEFECT: ICD-10-CM

## 2018-05-18 DIAGNOSIS — Z82.79 FAMILY HISTORY OF CONGENITAL CARDIAC SEPTAL DEFECT: Primary | ICD-10-CM

## 2018-05-18 PROCEDURE — 76825 ECHO EXAM OF FETAL HEART: CPT

## 2018-05-18 NOTE — MR AVS SNAPSHOT
After Visit Summary   5/18/2018    Ofelia Will    MRN: [id number]           Patient Information     Date Of Birth          1990        Visit Information        Provider Department      5/18/2018 2:00 PM Justin Almazan MD [facility] Maternal Fetal Medicine [facility]        Today's Diagnoses     Family history of congenital cardiac septal defect    -  1       Follow-ups after your visit        Your next 10 appointments already scheduled     May 31, 2018  9:45 AM CDT   ESTABL[facility] PRENATAL [facility]    [address]  [facility] [address]   [phone]            Jun 26, 2018  3:45 PM CDT   ESTABLISHED PRENATAL with Kelsi Jane MD   [facility]    [address]  [facility] [address] [phone]            Jul 24, 2018  3:45 PM CDT   ESTABLISHED PRENATAL with Kelsi Jane MD   [facility]    [address]  [facility] [address] [phone]              Who to contact     If you have questions or need follow up information about today's clinic visit or your schedule please contact [facility] F[facility] directly at [phone].  Normal or non-critical lab and imaging results will be communicated to you by MyChart, letter or phone within 4 business days after the clinic has received the results. If you do not hear from us within 7 days, please contact the clinic through [email or web address] or phone. If you have a critical or abnormal lab result, we will notify you by phone as soon as possible.  Submit refill requests through [facility] or call your pharmacy and they will forward the refill request to us. Please allow 3 business days for your refill to be completed.          Additional Information About Your Visit        [facility] Information     [facility] gives you secure access to  your electronic health record. If you see a primary care provider, you can also send messages to your care team and make appointments. If you have questions, please call your primary care clinic.  If you do not have a primary care provider, please call [phone] and they will assist you.        Care EveryWhere ID     This is your Care EveryWhere ID. This could be used by other organizations to access your [place] medical records  [id number]        Your Vitals Were     Last Period                   12/20/2017            Blood Pressure from Last 3 Encounters:   05/03/18 102/58   04/05/18 116/78   04/05/18 114/60    Weight from Last 3 Encounters:   05/03/18 67.6 kg (149 lb)   04/05/18 65.8 kg (145 lb 1 oz)   04/05/18 65.8 kg (145 lb)              Today, you had the following     No orders found for display       Primary Care Provider Office Phone # Fax #    Guerda Esquivel [phone] [address] [id number]        Equal Access to Services     MAHIN STEARNS : Hadii aad ku hadasho Soomaali, waaxda luqadaha, qaybta kaalmada adeegyajade, viki camara . So [facility] [phone].    ATENCIÓN: Si habla español, tiene a ferris disposición servicios gratuitos de asistencia lingüística. Llame al [phone].    We comply with applicable federal civil rights laws and [place] laws. We do not discriminate on the basis of race, color, national origin, age, disability, sex, sexual orientation, or gender identity.            Thank you!     Thank you for choosing [facility]  for your care. Our goal is always to provide you with excellent care. Hearing back from our patients is one way we can continue to improve our services. Please take a few minutes to complete the written survey that you may receive in the mail after your visit with us. Thank you!             Your Updated Medication List - Protect others around you: Learn how to safely use, store  and throw away your medicines at www.disposemymeds.org.          This list is accurate as of 5/18/18  2:23 PM.  Always use your most recent med list.                   Brand Name Dispense Instructions for use Diagnosis    prenatal multivitamin plus iron 27-0.8 MG Tabs per tablet     100 tablet    Take 1 tablet by mouth daily    Supervision of normal first pregnancy, antepartum       ranitidine 75 MG tablet    ZANTAC    30 tablet    Take 2 tablets (150 mg) by mouth 2 times daily    Gastroesophageal reflux disease without esophagitis

## 2018-05-18 NOTE — PROGRESS NOTES
"Please see \"Imaging\" tab under \"Chart Review\" for details of today's US at the [facility].    Justin Almazan MD  Maternal-Fetal Medicine    "

## 2018-05-21 ENCOUNTER — TELEPHONE (OUTPATIENT)
Dept: CARDIOLOGY | Facility: CLINIC | Age: 28
End: 2018-05-21

## 2018-05-21 NOTE — TELEPHONE ENCOUNTER
I called the patient twice to schedule their appointment with Dr. Fernandez. Left messages. Mailing letter.

## 2018-06-01 ENCOUNTER — PRENATAL OFFICE VISIT (OUTPATIENT)
Dept: OBGYN | Facility: CLINIC | Age: 28
End: 2018-06-01
Payer: COMMERCIAL

## 2018-06-01 VITALS — BODY MASS INDEX: 27.57 KG/M2 | SYSTOLIC BLOOD PRESSURE: 112 MMHG | DIASTOLIC BLOOD PRESSURE: 68 MMHG | WEIGHT: 154.8 LBS

## 2018-06-01 DIAGNOSIS — Z34.02 ENCOUNTER FOR SUPERVISION OF NORMAL FIRST PREGNANCY IN SECOND TRIMESTER: Primary | ICD-10-CM

## 2018-06-01 PROBLEM — Z34.00 ENCOUNTER FOR SUPERVISION OF NORMAL FIRST PREGNANCY: Status: ACTIVE | Noted: 2018-06-01

## 2018-06-01 PROCEDURE — 99207 ZZC PRENATAL VISIT: CPT | Performed by: ADVANCED PRACTICE MIDWIFE

## 2018-06-01 NOTE — PROGRESS NOTES
S: Feels well,  Baby active.  Denies uterine cramping, vaginal bleeding or leaking of fluid. Fetal echo WNL. Patient reports feeling depressed from time to time. Reports having good support at home. History of anxiety, treated with therapy and medication.   O: Vitals: /68 (BP Location: Right arm, Patient Position: Chair, Cuff Size: Adult Regular)  Wt 154 lb 12.8 oz (70.2 kg)  LMP 12/20/2017  BMI 27.57 kg/m2  BMI= Body mass index is 27.57 kg/(m^2).  Exam:  Constitutional: healthy, alert and no distress  Respiratory: respirations even and unlabored  Gastrointestinal: Abdomen soft, non-tender. Fundus measures appropriate for gestational age. Fetal heart tones hear without difficulty and within normal limits  : Deferred  Psychiatric: mentation appears normal and affect normal/bright  A:    Diagnosis Comments   1. Encounter for supervision of normal first pregnancy in second trimester  MENTAL HEALTH REFERRAL  - Adult; Outpatient Treatment, Assessments and Testing; General Psychological Testing; [facility] [facility] [phone]; We will contact you to schedule the appointment or please call with any questions; Roxana...      P: Discussed GCT/repeat RPR for next visit, handout provided, reminded of longer appointment.  Tdap next visit; reviewed CDC recommendations and partner/family vaccination recommended as well.  Need for Rhogam? No.  Mental health referral placed; reviewed comfort measures for depression and warning signs and when to call clinic.   Encouraged patient to call with any questions or concerns.  Return to clinic 4 weeks    Adri Mckeon CNM

## 2018-06-01 NOTE — MR AVS SNAPSHOT
After Visit Summary   6/1/2018    Ofelia Will    MRN: [id number]           Patient Information     Date Of Birth          1990        Visit Information        Provider Department      6/1/2018 8:00 AM Adri Mckeon APRN CNM [facility]        Today's Diagnoses     Encounter for supervision of normal first pregnancy in second trimester    -  1       Follow-ups after your visit        Additional Services     MENTAL HEALTH REFERRAL  - Adult; Outpatient Treatment, Assessments and Testing; General Psychological Testing; [facility] [phone]; We will contact you to schedule the appointment or please call with any questions; Roxana...       All scheduling is subject to the client's specific insurance plan & benefits, provider/location availability, and provider clinical specialities.  Please arrive 15 minutes early for your first appointment and bring your completed paperwork.    Please be aware that coverage of these services is subject to the terms and limitations of your health insurance plan.  Call member services at your health plan with any benefit or coverage questions.                            Follow-up notes from your care team     Return in about 4 weeks (around 6/29/2018) for Prenatal Visit.      Your next 10 appointments already scheduled     Jun 28, 2018  3:15 PM CDT   ESTABL[facility] PRENATAL [facility]    [address]  [facility] [id number] [phone]            Jul 24, 2018  3:45 PM CDT   ESTABLISHED PRENATAL with Kelsi Jane MD   [facility]    [address]  [facility] [address] [phone]              Who to contact     If you have questions or need follow up information about today's clinic visit or your schedule please contact [facility] directly at  [phone].  Normal or non-critical lab and imaging results will be communicated to you by Ignite100hart, letter or phone within 4 business days after the clinic has received the results. If you do not hear from us within 7 days, please contact the clinic through LucidErat or phone. If you have a critical or abnormal lab result, we will notify you by phone as soon as possible.  Submit refill requests through Covocative or call your pharmacy and they will forward the refill request to us. Please allow 3 business days for your refill to be completed.          Additional Information About Your Visit        Ignite100hart Information     Covocative gives you secure access to your electronic health record. If you see a primary care provider, you can also send messages to your care team and make appointments. If you have questions, please call your primary care clinic.  If you do not have a primary care provider, please call [phone] and they will assist you.        Care EveryWhere ID     This is your Care EveryWhere ID. This could be used by other organizations to access your [place] medical records  [id number]        Your Vitals Were     Last Period BMI (Body Mass Index)                12/20/2017 27.57 kg/m2           Blood Pressure from Last 3 Encounters:   06/01/18 112/68   05/03/18 102/58   04/05/18 116/78    Weight from Last 3 Encounters:   06/01/18 154 lb 12.8 oz (70.2 kg)   05/03/18 149 lb (67.6 kg)   04/05/18 145 lb 1 oz (65.8 kg)              We Performed the Following     MENTAL HEALTH REFERRAL  - Adult; Outpatient Treatment, Assessments and Testing; General Psychological Testing; [facility] [phone]; We will contact you to schedule the appointment or please call with any questions; Roxana...        Primary Care Provider Office Phone # Fax #    Guerda Esquivel [phone] [address] [facility] [address]        Equal Access to Services     MAHIN STEARNS : Frida franklin  Elin, lindseyda lumohitadaha, qageovannyta kapatrick guo, viki vernellin hayaan skipsheryl carolynsara lamaxwellsudhakar carlos. So [facility] [phone].    ATENCIÓN: Si habla beba, tiene a ferris disposición servicios gratuitos de asistencia lingüística. Joaquim al [phone].    We comply with applicable federal civil rights laws and [place] laws. We do not discriminate on the basis of race, color, national origin, age, disability, sex, sexual orientation, or gender identity.            Thank you!     Thank you for choosing [facility]  for your care. Our goal is always to provide you with excellent care. Hearing back from our patients is one way we can continue to improve our services. Please take a few minutes to complete the written survey that you may receive in the mail after your visit with us. Thank you!             Your Updated Medication List - Protect others around you: Learn how to safely use, store and throw away your medicines at www.disposemymeds.org.          This list is accurate as of 6/1/18  8:27 AM.  Always use your most recent med list.                   Brand Name Dispense Instructions for use Diagnosis    prenatal multivitamin plus iron 27-0.8 MG Tabs per tablet     100 tablet    Take 1 tablet by mouth daily    Supervision of normal first pregnancy, antepartum       ranitidine 75 MG tablet    ZANTAC    30 tablet    Take 2 tablets (150 mg) by mouth 2 times daily    Gastroesophageal reflux disease without esophagitis

## 2018-06-01 NOTE — NURSING NOTE
"Chief Complaint   Patient presents with     Prenatal Care   23w2d  Jenny Magallanes MA      Initial /68 (BP Location: Right arm, Patient Position: Chair, Cuff Size: Adult Regular)  Wt 154 lb 12.8 oz (70.2 kg)  LMP 2017  BMI 27.57 kg/m2 Estimated body mass index is 27.57 kg/(m^2) as calculated from the following:    Height as of 18: 5' 2.84\" (1.596 m).    Weight as of this encounter: 154 lb 12.8 oz (70.2 kg).  BP completed using cuff size: regular        The following HM Due: NONE      The following patient reported/Care Every where data was sent to:  P ABSTRACT QUALITY INITIATIVES [95083]        n/a              "

## 2018-06-28 ENCOUNTER — PRENATAL OFFICE VISIT (OUTPATIENT)
Dept: OBGYN | Facility: CLINIC | Age: 28
End: 2018-06-28
Payer: COMMERCIAL

## 2018-06-28 VITALS — BODY MASS INDEX: 28.67 KG/M2 | DIASTOLIC BLOOD PRESSURE: 78 MMHG | SYSTOLIC BLOOD PRESSURE: 110 MMHG | WEIGHT: 161 LBS

## 2018-06-28 DIAGNOSIS — Z23 NEED FOR TDAP VACCINATION: ICD-10-CM

## 2018-06-28 DIAGNOSIS — Z34.02 ENCOUNTER FOR SUPERVISION OF NORMAL FIRST PREGNANCY IN SECOND TRIMESTER: Primary | ICD-10-CM

## 2018-06-28 LAB
ERYTHROCYTE [DISTWIDTH] IN BLOOD BY AUTOMATED COUNT: 12.6 % (ref 10–15)
HCT VFR BLD AUTO: 32.9 % (ref 35–47)
HGB BLD-MCNC: 11 G/DL (ref 11.7–15.7)
MCH RBC QN AUTO: 31.3 PG (ref 26.5–33)
MCHC RBC AUTO-ENTMCNC: 33.4 G/DL (ref 31.5–36.5)
MCV RBC AUTO: 94 FL (ref 78–100)
PLATELET # BLD AUTO: 223 10E9/L (ref 150–450)
RBC # BLD AUTO: 3.51 10E12/L (ref 3.8–5.2)
WBC # BLD AUTO: 12.2 10E9/L (ref 4–11)

## 2018-06-28 PROCEDURE — 85027 COMPLETE CBC AUTOMATED: CPT | Performed by: OBSTETRICS & GYNECOLOGY

## 2018-06-28 PROCEDURE — 82950 GLUCOSE TEST: CPT | Performed by: OBSTETRICS & GYNECOLOGY

## 2018-06-28 PROCEDURE — 99207 ZZC PRENATAL VISIT: CPT | Performed by: OBSTETRICS & GYNECOLOGY

## 2018-06-28 PROCEDURE — 90715 TDAP VACCINE 7 YRS/> IM: CPT | Performed by: OBSTETRICS & GYNECOLOGY

## 2018-06-28 PROCEDURE — 36415 COLL VENOUS BLD VENIPUNCTURE: CPT | Performed by: OBSTETRICS & GYNECOLOGY

## 2018-06-28 PROCEDURE — 86780 TREPONEMA PALLIDUM: CPT | Performed by: OBSTETRICS & GYNECOLOGY

## 2018-06-28 PROCEDURE — 90471 IMMUNIZATION ADMIN: CPT | Performed by: OBSTETRICS & GYNECOLOGY

## 2018-06-28 NOTE — NURSING NOTE
"Chief Complaint   Patient presents with     Prenatal Care       Initial /78  Wt 161 lb (73 kg)  LMP 2017  Breastfeeding? No  BMI 28.67 kg/m2 Estimated body mass index is 28.67 kg/(m^2) as calculated from the following:    Height as of 18: 5' 2.84\" (1.596 m).    Weight as of this encounter: 161 lb (73 kg).  BP completed using cuff size: regular          27w1d  + FM daily  - cramping or bleeding  + ligament pain  + heartburn  + occas. Headache  Caitlin Johnson LPN               "

## 2018-06-28 NOTE — MR AVS SNAPSHOT
After Visit Summary   6/28/2018    Ofelia Will    MRN: [id number]           Patient Information     Date Of Birth          1990        Visit Information        Provider Department      6/28/2018 3:15 PM Kelsi Jane MD [facility]        Today's Diagnoses     Encounter for supervision of normal first pregnancy in second trimester    -  1    Need for Tdap vaccination           Follow-ups after your visit        Your next 10 appointments already scheduled     Jul 11, 2018  2:00 PM CDT   ESTABLISHED PRENATAL with LORENA Ybarra CNM   [facility]    [address] [facility] [address]   [phone]            Jul 24, 2018  3:45 PM CDT   ESTABLISHED PRENATAL with LORENA Ybarra CNM   [facility]    [address] [facility] [address]   [phone]            Aug 08, 2018  3:30 PM CDT   ESTABLISHED PRENATAL with Kelsi Jane MD   [facility]    [address] [place] [address]   [phone]            Aug 22, 2018  3:45 PM CDT   ESTABLISHED PRENATAL with Kelsi Jane MD   [facility]    [address] [place] [address]   [phone]            Aug 29, 2018  3:15 PM CDT   ESTABLISHED PRENATAL with Kelsi Jane MD   [facility]    [address] [place] [address]   [phone]            Sep 05, 2018  3:00 PM CDT   ESTABLISHED PRENATAL with Kelsi Jane MD   [facility]    [address] [place] [address]   [phone]            Sep 12, 2018  3:30 PM CDT   ESTABLISHED PRENATAL with Kelsi Jane MD   [facility]  ([facility])    [address] [facility] [address]   [phone]            Sep 19, 2018  3:30 PM CDT   ESTABLISHED PRENATAL with Kelsi Jane MD   [facility]    [address] [facility] [address] [phone]            Sep 26, 2018  3:30 PM CDT   ESTABLISHED PRENATAL with Kelsi Jane MD   [facility]    [address] [facility] [address] [phone]              Who to contact     If you have questions or need follow up information about today's clinic visit or your schedule please contact [facility] directly at [phone].  Normal or non-critical lab and imaging results will be communicated to you by MyChart, letter or phone within 4 business days after the clinic has received the results. If you do not hear from us within 7 days, please contact the clinic through AppMakrhart or phone. If you have a critical or abnormal lab result, we will notify you by phone as soon as possible.  Submit refill requests through Mo-DV or call your pharmacy and they will forward the refill request to us. Please allow 3 business days for your refill to be completed.          Additional Information About Your Visit        Mo-DV Information     Mo-DV gives you secure access to your electronic health record. If you see a primary care provider, you can also send messages to your care team and make appointments. If you have questions, please call your primary care clinic.  If you do not have a primary care provider, please call [phone] and they will assist you.        Care EveryWhere ID     This is your Care EveryWhere ID. This could be used by other organizations to access your [place] medical records  [id number]        Your Vitals Were     Last Period Breastfeeding? BMI (Body Mass Index)             12/20/2017 No 28.67  kg/m2          Blood Pressure from Last 3 Encounters:   06/28/18 110/78   06/01/18 112/68   05/03/18 102/58    Weight from Last 3 Encounters:   06/28/18 161 lb (73 kg)   06/01/18 154 lb 12.8 oz (70.2 kg)   05/03/18 149 lb (67.6 kg)              We Performed the Following     CBC with platelets     Glucose tolerance, gest screen, 1 hour     TDAP, IM (10 - 64 YRS) - Adacel     Treponema Abs w Reflex to RPR and Titer     VACCINE ADMINISTRATION, INITIAL        Primary Care Provider Office Phone # Fax #    Guerda Esquivel [phone]       [address]  [facility] [address]        Equal Access to Services     MAHIN STEARNS : Frida Worthington, wagersonda kike, qaybta kaalmada sari, viki gonzalez. So [facility] [phone].    ATENCIÓN: Si habla español, tiene a ferris disposición servicios gratuitos de asistencia lingüística. [facility] [phone].    We comply with applicable federal civil rights laws and [place] laws. We do not discriminate on the basis of race, color, national origin, age, disability, sex, sexual orientation, or gender identity.            Thank you!     Thank you for choosing [facility]  for your care. Our goal is always to provide you with excellent care. Hearing back from our patients is one way we can continue to improve our services. Please take a few minutes to complete the written survey that you may receive in the mail after your visit with us. Thank you!             Your Updated Medication List - Protect others around you: Learn how to safely use, store and throw away your medicines at www.disposemymeds.org.          This list is accurate as of 6/28/18  4:36 PM.  Always use your most recent med list.                   Brand Name Dispense Instructions for use Diagnosis    prenatal multivitamin plus iron 27-0.8 MG Tabs per tablet     100 tablet    Take 1 tablet by mouth daily    Supervision of normal first pregnancy,  antepartum       ranitidine 75 MG tablet    ZANTAC    30 tablet    Take 2 tablets (150 mg) by mouth 2 times daily    Gastroesophageal reflux disease without esophagitis

## 2018-06-28 NOTE — PROGRESS NOTES
PROBLEM LIST  LABS:Opos/RNI  GIRL    1. Cf carrier: partner declined testing.  2. Hx of atrial septal defect, status post repair. Plan: fetal echo in [facility] (scheduled for 5/18)  3. Rubella nonimmune: MMR after delivery.    Doing well, echo was normal. Glucose tolerance test today, Tdap done. Follow up in 2 weeks.    Kelsi Jane MD

## 2018-06-29 LAB — GLUCOSE 1H P 50 G GLC PO SERPL-MCNC: 75 MG/DL (ref 60–129)

## 2018-06-30 LAB — T PALLIDUM AB SER QL: NONREACTIVE

## 2018-07-11 ENCOUNTER — PRENATAL OFFICE VISIT (OUTPATIENT)
Dept: OBGYN | Facility: CLINIC | Age: 28
End: 2018-07-11
Payer: COMMERCIAL

## 2018-07-11 VITALS — WEIGHT: 163 LBS | DIASTOLIC BLOOD PRESSURE: 62 MMHG | SYSTOLIC BLOOD PRESSURE: 108 MMHG | BODY MASS INDEX: 29.03 KG/M2

## 2018-07-11 DIAGNOSIS — Z34.03 ENCOUNTER FOR SUPERVISION OF NORMAL FIRST PREGNANCY IN THIRD TRIMESTER: Primary | ICD-10-CM

## 2018-07-11 PROCEDURE — 99207 ZZC PRENATAL VISIT: CPT | Performed by: ADVANCED PRACTICE MIDWIFE

## 2018-07-11 NOTE — NURSING NOTE
"Chief Complaint   Patient presents with     Prenatal Care     29 weeks- no concerns        Initial /62 (BP Location: Right arm, Patient Position: Sitting, Cuff Size: Adult Regular)  Wt 163 lb (73.9 kg)  LMP 2017  BMI 29.03 kg/m2 Estimated body mass index is 29.03 kg/(m^2) as calculated from the following:    Height as of 18: 5' 2.84\" (1.596 m).    Weight as of this encounter: 163 lb (73.9 kg).  BP completed using cuff size: regular        The following HM Due: NONE    patient has appointment for today.  Ferny Schaeffer CMA                 "

## 2018-07-11 NOTE — MR AVS SNAPSHOT
After Visit Summary   7/11/2018    Ofelia Will    MRN: [id number]           Patient Information     Date Of Birth          1990        Visit Information        Provider Department      7/11/2018 2:00 PM Adri Mckeon APRN CNM [facility]        Today's Diagnoses     Encounter for supervision of normal first pregnancy in third trimester    -  1       Follow-ups after your visit        Follow-up notes from your care team     Return in about 2 weeks (around 7/25/2018) for Prenatal Visit.      Your next 10 appointments already scheduled     Jul 24, 2018  3:45 PM CDT   ESTABLISHED PRENATAL with LORENA Ybarra CNM   [facility]    [address]  [facility] [address]   [phone]            Aug 08, 2018  3:30 PM CDT   ESTABL[facility]    [address] [place] [address]   [phone]            Aug 22, 2018  3:45 PM CDT   ESTABLISHED PRENATAL with Kelsi [facility] MD   [facility]    [address] [place] [address]   [phone]            Aug 29, 2018  3:15 PM CDT   ESTABLISHED PRENATAL with Kelsi [facility] MD   [facility]    [address] [place] [address]   [phone]            Sep 05, 2018  3:00 PM CDT   ESTABLISHED PRENATAL with Kelsi [facility]    [address] [place] [address]   [phone]            Sep 12, 2018  3:30 PM CDT   ESTABLISHED PRENATAL [facility] Kelsi [facility]    [address] [place] [address]   [phone]            Sep 19, 2018  3:30 PM CDT    ESTABLISHED PRENATAL [facility]    [address] [facility] [address] [phone]            Sep 26, 2018  3:30 PM CDT   ESTABLISHED PRENATAL with [facility]elsi [facility]    [address] [facility] [address] [phone]              Who to contact     If you have questions or need follow up information about today's clinic visit or your schedule please contact [facility] directly at [phone].  Normal or non-critical lab and imaging results will be communicated to you by MyChart, letter or phone within 4 business days after the clinic has received the results. If you do not hear from us within 7 days, please contact the clinic through Energy Solutions Internationalhart or phone. If you have a critical or abnormal lab result, we will notify you by phone as soon as possible.  Submit refill requests through Owingo or call your pharmacy and they will forward the refill request to us. Please allow 3 business days for your refill to be completed.          Additional Information About Your Visit        [employer]harClink Information     Owingo gives you secure access to your electronic health record. If you see a primary care provider, you can also send messages to your care team and make appointments. If you have questions, please call your primary care clinic.  If you do not have a primary care provider, please call [phone] and they will assist you.        Care EveryWhere ID     This is your Care EveryWhere ID. This could be used by other organizations to access your [place] medical records  [id number]        Your Vitals Were     Last Period BMI (Body Mass Index)                12/20/2017 29.03 kg/m2           Blood Pressure from Last 3 Encounters:   07/11/18 108/62   06/28/18 110/78   06/01/18 112/68    Weight from Last 3 Encounters:   07/11/18 163  lb (73.9 kg)   06/28/18 161 lb (73 kg)   06/01/18 154 lb 12.8 oz (70.2 kg)              Today, you had the following     No orders found for display       Primary Care Provider Office Phone # Fax #    Guerda Esquivel [phone] [address]  [facility] [address]        Equal Access to Services     [facility] JINNY : Hadii aad ku hadasho Soomaali, waaxda luqadaha, qaybta kaalmada adeegyada, waxay idiin hayaan adeeg kharash la'aan ah. So [facility] [phone].    ATENCIÓN: Si habla español, tiene a ferris disposición servicios gratuitos de asistencia lingüística. Jefeame al [phone].    We comply with applicable federal civil rights laws and [place] laws. We do not discriminate on the basis of race, color, national origin, age, disability, sex, sexual orientation, or gender identity.            Thank you!     Thank you for choosing [facility]  for your care. Our goal is always to provide you with excellent care. Hearing back from our patients is one way we can continue to improve our services. Please take a few minutes to complete the written survey that you may receive in the mail after your visit with us. Thank you!             Your Updated Medication List - Protect others around you: Learn how to safely use, store and throw away your medicines at www.disposemymeds.org.          This list is accurate as of 7/11/18  2:39 PM.  Always use your most recent med list.                   Brand Name Dispense Instructions for use Diagnosis    prenatal multivitamin plus iron 27-0.8 MG Tabs per tablet     100 tablet    Take 1 tablet by mouth daily    Supervision of normal first pregnancy, antepartum       ranitidine 75 MG tablet    ZANTAC    30 tablet    Take 2 tablets (150 mg) by mouth 2 times daily    Gastroesophageal reflux disease without esophagitis

## 2018-07-11 NOTE — PROGRESS NOTES
S: Feels well,  Baby active.  Denies uterine cramping, vaginal bleeding or leaking of fluid. 1hr GCT 75, Hgb 11. Patient reports muscle pain in legs and heartburn.   O: Vitals: /62 (BP Location: Right arm, Patient Position: Sitting, Cuff Size: Adult Regular)  Wt 163 lb (73.9 kg)  LMP 12/20/2017  BMI 29.03 kg/m2  BMI= Body mass index is 29.03 kg/(m^2).  Exam:  Constitutional: healthy, alert and no distress  Respiratory: respirations even and unlabored  Gastrointestinal: Abdomen soft, non-tender. Fundus measures appropriate for gestational age. Fetal heart tones hear without difficulty and within normal limits  : Deferred  Psychiatric: mentation appears normal and affect normal/bright  A:     ICD-10-CM    1. Encounter for supervision of normal first pregnancy in third trimester Z34.03      P: Discussed plans for labor.   Discussed comfort measures for heartburn and muscle cramps.   Encouraged patient to call with any questions or concerns.  Return to clinic 2 weeks    Adri Mckeon CNM

## 2018-07-20 ENCOUNTER — ALLIED HEALTH/NURSE VISIT (OUTPATIENT)
Dept: NURSING | Facility: CLINIC | Age: 28
End: 2018-07-20
Payer: COMMERCIAL

## 2018-07-20 VITALS — SYSTOLIC BLOOD PRESSURE: 116 MMHG | DIASTOLIC BLOOD PRESSURE: 70 MMHG

## 2018-07-20 DIAGNOSIS — Z01.30 BLOOD PRESSURE CHECK: Primary | ICD-10-CM

## 2018-07-20 PROCEDURE — 99207 ZZC NO CHARGE NURSE ONLY: CPT

## 2018-07-20 NOTE — MR AVS SNAPSHOT
After Visit Summary   7/20/2018    Ofelia Will    MRN: [id number]           Patient Information     Date Of Birth          1990        Visit Information        Provider Department      7/20/2018 3:45 PM RI OB NURSE [facility]        Today's Diagnoses     Blood pressure check    -  1       Follow-ups after your visit        Your next 10 appointments already scheduled     Jul 20, 2018  3:45 PM CDT   Nurse Only with RI OB NURSE   [facility]    [address]  [facility] [address]   [phone]            Jul 24, 2018  3:45 PM CDT   ESTABLISHED PRENATAL with LORENA Ybarra CNM   [facility]    [address]  [facility] [address]   [phone]            Aug 08, 2018  3:30 PM CDT   ESTABLISHED PRENATAL with Kelsi Jane MD   [facility]    [address] [place] [address]   [phone]            Aug 22, 2018  3:45 PM CDT   ESTABLISHED PRENATAL with Kelsi Jane MD   [facility]    [address] [place] [address]   [phone]            Aug 29, 2018  3:15 PM CDT   ESTABLISHED PRENATAL with Kelsi Jane MD   [facility]    [address] [place] [address]   [phone]            Sep 05, 2018  3:00 PM CDT   ESTABLISHED PRENATAL with Kelsi Jane MD   [facility])    [address] [place] [address]   [phone]            Sep 12, 2018  3:30 PM CDT   ESTABLISHED PRENATAL with eKlsi Jane MD   [facility]    [address] [place] [address]   [phone]            Sep 19, 2018   3:30 PM CDT   ESTABLISHED PRENATAL with Kelsi Jane MD   [facility]    [address] [facility] [address] [phone]            Sep 26, 2018  3:30 PM CDT   ESTABLISHED PRENATAL with Kelsi Jane MD   [facility]    [address] [facility] [address] [phone]              Who to contact     If you have questions or need follow up information about today's clinic visit or your schedule please contact [facility] directly at [phone].  Normal or non-critical lab and imaging results will be communicated to you by MyChart, letter or phone within 4 business days after the clinic has received the results. If you do not hear from us within 7 days, please contact the clinic through Creoptixhart or phone. If you have a critical or abnormal lab result, we will notify you by phone as soon as possible.  Submit refill requests through Nebo or call your pharmacy and they will forward the refill request to us. Please allow 3 business days for your refill to be completed.          Additional Information About Your Visit        CreoptixharSpringshot Information     Nebo gives you secure access to your electronic health record. If you see a primary care provider, you can also send messages to your care team and make appointments. If you have questions, please call your primary care clinic.  If you do not have a primary care provider, please call [phone] and they will assist you.        Care EveryWhere ID     This is your Care EveryWhere ID. This could be used by other organizations to access your [place] medical records  [id number]        Your Vitals Were     Last Period                   12/20/2017            Blood Pressure from Last 3 Encounters:   07/20/18 116/70   07/11/18 108/62   06/28/18 110/78    Weight from Last 3 Encounters:   07/11/18 163 lb (73.9 kg)    06/28/18 161 lb (73 kg)   06/01/18 154 lb 12.8 oz (70.2 kg)              Today, you had the following     No orders found for display       Primary Care Provider Office Phone # Fax #    Guerda Esquivel [phone] [address]  [facility] [address]        Equal Access to Services     MAHIN STEARNS : Hadii aad ku hadasho Soomaali, waaxda luqadaha, qaybta kaalmada adeegyada, waxay idiin hayaan adeeg khmicheletsh laMelyaan . So [facility] [phone].    ATENCIÓN: Si habla español, tiene a ferris disposición servicios gratuitos de asistencia lingüística. Jefeame al [phone].    We comply with applicable federal civil rights laws and [place] laws. We do not discriminate on the basis of race, color, national origin, age, disability, sex, sexual orientation, or gender identity.            Thank you!     Thank you for choosing [facility]  for your care. Our goal is always to provide you with excellent care. Hearing back from our patients is one way we can continue to improve our services. Please take a few minutes to complete the written survey that you may receive in the mail after your visit with us. Thank you!             Your Updated Medication List - Protect others around you: Learn how to safely use, store and throw away your medicines at www.disposemymeds.org.          This list is accurate as of 7/20/18  3:43 PM.  Always use your most recent med list.                   Brand Name Dispense Instructions for use Diagnosis    prenatal multivitamin plus iron 27-0.8 MG Tabs per tablet     100 tablet    Take 1 tablet by mouth daily    Supervision of normal first pregnancy, antepartum       ranitidine 75 MG tablet    ZANTAC    30 tablet    Take 2 tablets (150 mg) by mouth 2 times daily    Gastroesophageal reflux disease without esophagitis

## 2018-07-20 NOTE — NURSING NOTE
Pt came in for blood pressure check due to a 30 minute episode of blurred vision followed by a headache. Her blood pressure normal. I advised her to go to the ER if it happens again on the weekend. She has never had an eye exam, suggested that also. Her next appt is Tuesday with Dr Jane

## 2018-07-24 ENCOUNTER — PRENATAL OFFICE VISIT (OUTPATIENT)
Dept: OBGYN | Facility: CLINIC | Age: 28
End: 2018-07-24
Payer: COMMERCIAL

## 2018-07-24 VITALS — SYSTOLIC BLOOD PRESSURE: 112 MMHG | WEIGHT: 167 LBS | DIASTOLIC BLOOD PRESSURE: 64 MMHG | BODY MASS INDEX: 29.74 KG/M2

## 2018-07-24 DIAGNOSIS — Z34.03 ENCOUNTER FOR SUPERVISION OF NORMAL FIRST PREGNANCY IN THIRD TRIMESTER: Primary | ICD-10-CM

## 2018-07-24 PROCEDURE — 99207 ZZC PRENATAL VISIT: CPT | Performed by: ADVANCED PRACTICE MIDWIFE

## 2018-07-24 NOTE — LETTER
July 24, 2018      Ofelia Will  [address] [facility] [address]        To Whom It May Concern,      Ofelia is pregnant and under my care. Due to pregnancy, she should not lift more than 25lbs.           Sincerely,        LORENA Encarnacion CNM

## 2018-07-24 NOTE — MR AVS SNAPSHOT
After Visit Summary   7/24/2018    Ofelia Will    MRN: [id number]           Patient Information     Date Of Birth          1990        Visit Information        Provider Department      7/24/2018 3:45 PM Adri Mckeon APRN [facility]        Today's Diagnoses     Encounter for supervision of normal first pregnancy in third trimester    -  1       Follow-ups after your visit        Follow-up notes from your care team     Return in about 2 weeks (around 8/7/2018) for Prenatal Visit.      Your next 10 appointments already scheduled     Aug 08, 2018  3:30 PM CDT   ESTABL[facility]   [facility]    [address] [place] [address]   [phone]            Aug 22, 2018  3:45 PM CDT   ESTABLISHED PRENATAL with Kelsi Jane MD   [facility]    [address] [place] [address]   [phone]            Aug 29, 2018  3:15 PM CDT   ESTABLISHED PRENATAL with Kelsi Jane MD   [facility]    [address] [place] [address]   [phone]            Sep 05, 2018  3:00 PM CDT   ESTABLISHED PRENATAL with Kelsi Jane MD   [facility]    [address] [place] [address]   [phone]            Sep 12, 2018  3:30 PM CDT   ESTABLISHED PRENATAL with Kelsi Jane MD   [facility]    [address] [place] [address]   [phone]            Sep 19, 2018  3:30 PM CDT   ESTABLISHED PRENATAL with Kelsi Jane MD   [facility]    [address] [place] [address]   [phone]            Sep 26, 2018  3:30 PM CDT    [facility]    [address] [facility] [address] [phone]              Who to contact     If you have questions or need follow up information about today's clinic visit or your schedule please contact [facility] directly at [phone].  Normal or non-critical lab and imaging results will be communicated to you by MyChart, letter or phone within 4 business days after the clinic has received the results. If you do not hear from us within 7 days, please contact the clinic through [employer] or phone. If you have a critical or abnormal lab result, we will notify you by phone as soon as possible.  Submit refill requests through [employer] or call your pharmacy and they will forward the refill request to us. Please allow 3 business days for your refill to be completed.          Additional Information About Your Visit        [employer] Information     [employer] gives you secure access to your electronic health record. If you see a primary care provider, you can also send messages to your care team and make appointments. If you have questions, please call your primary care clinic.  If you do not have a primary care provider, please call [phone] and they will assist you.        Care EveryWhere ID     This is your Care EveryWhere ID. This could be used by other organizations to access your [facility] medical records  [id number]        Your Vitals Were     Last Period BMI (Body Mass Index)                12/20/2017 29.74 kg/m2           Blood Pressure from Last 3 Encounters:   07/24/18 112/64   07/20/18 116/70   07/11/18 108/62    Weight from Last 3 Encounters:   07/24/18 167 lb (75.8 kg)   07/11/18 163 lb (73.9 kg)   06/28/18 161 lb (73 kg)              Today, you had the following     No orders found for display       Primary Care Provider Office Phone # Fax #    Guerda Esquivel MD  [phone]       [address]  [facility] [address]        Equal Access to Services     MAHIN STEARNS : Hadii aad ku hadephraim Worthington, waaxda luqadaha, qaybta kaalmada sari, viki garciapatricio carlos. So [facility] [phone].    ATENCIÓN: Si habla español, tiene a ferris disposición servicios gratuitos de asistencia lingüística. Llame al [phone].    We comply with applicable federal civil rights laws and [place] laws. We do not discriminate on the basis of race, color, national origin, age, disability, sex, sexual orientation, or gender identity.            Thank you!     Thank you for choosing [facility]  for your care. Our goal is always to provide you with excellent care. Hearing back from our patients is one way we can continue to improve our services. Please take a few minutes to complete the written survey that you may receive in the mail after your visit with us. Thank you!             Your Updated Medication List - Protect others around you: Learn how to safely use, store and throw away your medicines at www.disposemymeds.org.          This list is accurate as of 7/24/18  4:21 PM.  Always use your most recent med list.                   Brand Name Dispense Instructions for use Diagnosis    prenatal multivitamin plus iron 27-0.8 MG Tabs per tablet     100 tablet    Take 1 tablet by mouth daily    Supervision of normal first pregnancy, antepartum       ranitidine 75 MG tablet    ZANTAC    30 tablet    Take 2 tablets (150 mg) by mouth 2 times daily    Gastroesophageal reflux disease without esophagitis

## 2018-07-24 NOTE — NURSING NOTE
"Chief Complaint   Patient presents with     Prenatal Care     30 weeks 6 days- concerns of blurred vision       Initial /64 (BP Location: Left arm, Patient Position: Sitting, Cuff Size: Adult Regular)  Wt 167 lb (75.8 kg)  LMP 2017  BMI 29.74 kg/m2 Estimated body mass index is 29.74 kg/(m^2) as calculated from the following:    Height as of 18: 5' 2.84\" (1.596 m).    Weight as of this encounter: 167 lb (75.8 kg).  BP completed using cuff size: regular        The following HM Due: NONE    patient has appointment for today.  Ferny Schaeffer CMA                 "

## 2018-07-24 NOTE — PROGRESS NOTES
S: Feels well,  Baby active.  Denies uterine cramping, vaginal bleeding or leaking of fluid. Patient had an episode of blurry vision last week. Normal BP. Reports that it got better after she ate. Discussed that her 1hr GCT was 75, and she may trend low on her BS, encouraged rest, snack, and hydration if happens again. Hgb 11.   O: Vitals: /64 (BP Location: Left arm, Patient Position: Sitting, Cuff Size: Adult Regular)  Wt 167 lb (75.8 kg)  LMP 12/20/2017  BMI 29.74 kg/m2  BMI= Body mass index is 29.74 kg/(m^2).  Exam:  Constitutional: healthy, alert and no distress  Respiratory: respirations even and unlabored  Gastrointestinal: Abdomen soft, non-tender. Fundus measures appropriate for gestational age. Fetal heart tones hear without difficulty and within normal limits  : Deferred  Psychiatric: mentation appears normal and affect normal/bright  A:     ICD-10-CM    1. Encounter for supervision of normal first pregnancy in third trimester Z34.03      P: Discussed plans for labor.   Given note for work to lift no more than 25lbs.  Encouraged patient to call with any questions or concerns.  Return to clinic 2 weeks    Adri Mckeon CNM

## 2018-08-08 ENCOUNTER — PRENATAL OFFICE VISIT (OUTPATIENT)
Dept: OBGYN | Facility: CLINIC | Age: 28
End: 2018-08-08
Payer: COMMERCIAL

## 2018-08-08 VITALS — SYSTOLIC BLOOD PRESSURE: 108 MMHG | BODY MASS INDEX: 30.45 KG/M2 | WEIGHT: 171 LBS | DIASTOLIC BLOOD PRESSURE: 66 MMHG

## 2018-08-08 DIAGNOSIS — Z34.03 ENCOUNTER FOR SUPERVISION OF NORMAL FIRST PREGNANCY IN THIRD TRIMESTER: Primary | ICD-10-CM

## 2018-08-08 DIAGNOSIS — O99.891 BACK PAIN AFFECTING PREGNANCY IN THIRD TRIMESTER: ICD-10-CM

## 2018-08-08 DIAGNOSIS — M54.9 BACK PAIN AFFECTING PREGNANCY IN THIRD TRIMESTER: ICD-10-CM

## 2018-08-08 PROCEDURE — 99207 ZZC PRENATAL VISIT: CPT | Performed by: OBSTETRICS & GYNECOLOGY

## 2018-08-08 NOTE — MR AVS SNAPSHOT
After Visit Summary   8/8/2018    Ofelia Will    MRN: [id number]           Patient Information     Date Of Birth          1990        Visit Information        Provider Department      8/8/2018 3:30 PM Kelsi Jane MD [facility]        Today's Diagnoses     Encounter for supervision of normal first pregnancy in third trimester    -  1    Back pain affecting pregnancy in third trimester           Follow-ups after your visit        Additional Services     [facility] PT, HAND, AND CHIROPRACTIC REFERRAL       **This order will print in the [facility] Scheduling Office**    Physical Therapy, Hand Therapy and Chiropractic Care are available through:    *[facility]  *[facility]  *[facility]    Call one number to schedule at any of the above locations: [phone].    Your provider has referred you to: Physical Therapy at [facility] or [facility]    Indication/Reason for Referral: Women's Health (Please Complete Special Programs SmartList)  Therapy Orders: Evaluate and Treat  Special Programs: None and Women's Health: OB/GYN Musculoskeletal Dysfunction: Pregnancy: 33 Weeks Gestation   Special Request: Equipment: As Indicated  None    Donte Kent      Additional Comments for the Therapist or Chiropractor:     Please be aware that coverage of these services is subject to the terms and limitations of your health insurance plan.  Call member services at your health plan with any benefit or coverage questions.      Please bring the following to your appointment:    *Your personal calendar for scheduling future appointments  *Comfortable clothing                  Your next 10 appointments already scheduled     Aug 22, 2018  3:45 PM CDT   ESTABLISHED PRENATAL with [facility]elsi Jane MD   [facility]    [address] [place] [address]   [phone]            Aug 29, 2018  3:15 PM  CDT   ESTABLISHED PRENATAL with [facility]    [address] [facility] [address]   [phone]            Sep 05, 2018  3:00 PM CDT   ESTABLISHED PRENATAL with Kelsi Jane MD   [facility]    [address] [place] [address]   [phone]            Sep 12, 2018  3:30 PM CDT   ESTABLISHED PRENATAL with Kelsi Jane MD   [facility]    [address] [place] [address]   [phone]            Sep 19, 2018  3:30 PM CDT   ESTABLISHED PRENATAL with Kelsi Jane MD   [facility]    [address] [place] [address]   [phone]            Sep 26, 2018  3:30 PM CDT   ESTABLISHED PRENATAL with Kelsi Jane MD   [facility]    [address] [facility] [address]   [phone]              Who to contact     If you have questions or need follow up information about today's clinic visit or your schedule please contact [facility] directly at [phone].  Normal or non-critical lab and imaging results will be communicated to you by [employer], letter or phone within 4 business days after the clinic has received the results. If you do not hear from us within 7 days, please contact the clinic through [employer] or phone. If you have a critical or abnormal lab result, we will notify you by phone as soon as possible.  Submit refill requests through [employer] or call your pharmacy and they will forward the refill request to us. Please allow 3 business days for your refill to be completed.          Additional Information About Your Visit        [employer] Information     [employer] gives you secure access to your electronic health record. If  you see a primary care provider, you can also send messages to your care team and make appointments. If you have questions, please call your primary care clinic.  If you do not have a primary care provider, please call [phone] and they will assist you.        Care EveryWhere ID     This is your Care EveryWhere ID. This could be used by other organizations to access your [facility] medical records  [id number]        Your Vitals Were     Last Period Breastfeeding? BMI (Body Mass Index)             12/20/2017 No 30.45 kg/m2          Blood Pressure from Last 3 Encounters:   08/08/18 108/66   07/24/18 112/64   07/20/18 116/70    Weight from Last 3 Encounters:   08/08/18 171 lb (77.6 kg)   07/24/18 167 lb (75.8 kg)   07/11/18 163 lb (73.9 kg)              We Performed the Following     [facility] PT, HAND, AND CHIROPRACTIC REFERRAL        Primary Care Provider Office Phone # Fax #    Guerda Esquivel [phone] [address] [place] [id number]        Equal Access to Services     [facility]: Hadii cierra ku hadasho S[place], waaxda luqadaha, qaybta kaalmada sari, viki camara . So [facility] [phone].    ATENCIÓN: Si habla español, tiene a ferris disposición servicios gratuitos de asistencia lingüística. Joaquim al [phone].    We comply with applicable federal civil rights laws and [place] laws. We do not discriminate on the basis of race, color, national origin, age, disability, sex, sexual orientation, or gender identity.            Thank you!     Thank you for choosing [facility]  for your care. Our goal is always to provide you with excellent care. Hearing back from our patients is one way we can continue to improve our services. Please take a few minutes to complete the written survey that you may receive in the mail after your visit with us. Thank you!             Your Updated Medication List - Protect others around you: Learn how to safely use,  store and throw away your medicines at www.disposemymeds.org.          This list is accurate as of 8/8/18  4:19 PM.  Always use your most recent med list.                   Brand Name Dispense Instructions for use Diagnosis    prenatal multivitamin plus iron 27-0.8 MG Tabs per tablet     100 tablet    Take 1 tablet by mouth daily    Supervision of normal first pregnancy, antepartum       ranitidine 75 MG tablet    ZANTAC    30 tablet    Take 2 tablets (150 mg) by mouth 2 times daily    Gastroesophageal reflux disease without esophagitis

## 2018-08-08 NOTE — PROGRESS NOTES
PROBLEM LIST  LABS:Opos/RNI  GIRL    1. Cf carrier: partner declined testing.  2. Hx of atrial septal defect, status post repair. Plan: fetal echo in [facility] (scheduled for 5/18)  3. Rubella nonimmune: MMR after delivery.    Lots of lower abdominal/pelvic and low back pain. This sounds normal, but will refer to physical therapy for evaluation and treatment as she is only 33 weeks and this is significantly impacting her. Follow up with me in 2 weeks.    Kelsi Jane MD

## 2018-08-08 NOTE — LETTER
[facility]  [address] [place] [address]  [phone]          August 8, 2018      Regarding:  Ofelia Will                                                  YOB: 1990  Date:  8/8/2018      The above patient is under my care for pregnancy. I recommend the following accommodations be made for her for the pregnancy:    -No lifting greater than 20 pounds  -One 15 minute break every 4 hours  -No climbing steps stools or ladders    -Ability to sit in a chair or on a stool as needed  -Limit squatting/bending over as much as feasible    Should you have any questions, please do not hesitate to contact my office the number listed above.    Sincerely,        Kelsi Jane MD  Obstetrics and Gynecology

## 2018-08-08 NOTE — NURSING NOTE
"Chief Complaint   Patient presents with     Prenatal Care       Initial /66  Wt 171 lb (77.6 kg)  LMP 2017  Breastfeeding? No  BMI 30.45 kg/m2 Estimated body mass index is 30.45 kg/(m^2) as calculated from the following:    Height as of 18: 5' 2.84\" (1.596 m).    Weight as of this encounter: 171 lb (77.6 kg).  BP completed using cuff size: regular        33w0d  + FM daily  + heartburn - using Zantac  - headache  - contractions  + cramping and ligament pain  + back pain  - bleeding or leaking of fluid  Caitlin Johnson LPN                 "

## 2018-08-22 ENCOUNTER — THERAPY VISIT (OUTPATIENT)
Dept: PHYSICAL THERAPY | Facility: CLINIC | Age: 28
End: 2018-08-22
Payer: COMMERCIAL

## 2018-08-22 ENCOUNTER — PRENATAL OFFICE VISIT (OUTPATIENT)
Dept: OBGYN | Facility: CLINIC | Age: 28
End: 2018-08-22
Payer: COMMERCIAL

## 2018-08-22 VITALS — SYSTOLIC BLOOD PRESSURE: 106 MMHG | BODY MASS INDEX: 30.98 KG/M2 | WEIGHT: 174 LBS | DIASTOLIC BLOOD PRESSURE: 70 MMHG

## 2018-08-22 DIAGNOSIS — M53.3 SACROILIAC JOINT PAIN: ICD-10-CM

## 2018-08-22 DIAGNOSIS — Z34.03 ENCOUNTER FOR SUPERVISION OF NORMAL FIRST PREGNANCY IN THIRD TRIMESTER: Primary | ICD-10-CM

## 2018-08-22 DIAGNOSIS — Z33.1 PREGNANCY, INCIDENTAL: ICD-10-CM

## 2018-08-22 DIAGNOSIS — R10.2 PELVIC PAIN IN FEMALE: Primary | ICD-10-CM

## 2018-08-22 PROCEDURE — 97161 PT EVAL LOW COMPLEX 20 MIN: CPT | Mod: GP | Performed by: PHYSICAL THERAPIST

## 2018-08-22 PROCEDURE — 97112 NEUROMUSCULAR REEDUCATION: CPT | Mod: GP | Performed by: PHYSICAL THERAPIST

## 2018-08-22 PROCEDURE — 99207 ZZC PRENATAL VISIT: CPT | Performed by: OBSTETRICS & GYNECOLOGY

## 2018-08-22 NOTE — PROGRESS NOTES
[facility] Initial Evaluation  Subjective:  Patient is a 28 year old female presenting with rehab back hpi. The history is provided by the patient.   Ofelia Will is a 28 year old female with a sacroiliac (pubic pain and SI pain with pregnancy) condition.      This is a new condition  Patient has chief complaint of pubic symphysis, low back and bilateral sciatica pain into posterior thighs. Lifting restrictions=20 lbs and bending restrictions for past 2 weeks.    Patient reports pain:  Central lumbar spine.  Radiates to:  Gluteals right, gluteals left, thigh right and thigh left.  Pain is described as shooting and cramping and is constant and reported as 4/10 and 7/10.  Associated symptoms:  Loss of motion/stiffness. Pain is the same all the time.  Symptoms are exacerbated by bending, sitting, standing and walking and relieved by rest.  Since onset symptoms are gradually worsening.        General health as reported by patient is excellent.                                              Objective:  System         Lumbar/SI Evaluation  ROM:    AROM Lumbar:   Flexion:            80%  Ext:                    60%   Side Bend:        Left:  70%    Right:  70%  Rotation:           Left:  80%    Right:  80%  Side Glide:        Left:     Right:           Lumbar Myotomes:  normal            Lumbar DTR's:  normal        Lumbar Dermtomes:  normal                Neural Tension/Mobility:  Lumbar:  Normal        Lumbar Palpation:  Palpation (lumbar): pubic symphysis.    Tenderness not present at Left:    Piriformis; PSIS or Greater Trochanter    Tenderness not present at Right:  Piriformis; PSIS or Greater Trochanter    Lumbar Provocation:      Left negative with:  PROM hip    Right negative with:  PROM hip    SI joint/Sacrum:    Mild elevated pubic symphysis on right          Sacral conclusion right:  Elevated pubic sym                                             General     ROS    Assessment/Plan:     Patient is a 28 year old female with sacral and pelvic complaints.    Patient has the following significant findings with corresponding treatment plan.                Diagnosis 1:  SI and pubic instability with pregnancy  Pain -  hot/cold therapy, education and manual therapy  Decreased strength - therapeutic exercise, therapeutic activities and home program  Decreased function - therapeutic activities and home program  Instability -  Therapeutic Activity  Therapeutic Exercise  Neuromuscular Re-education    Therapy Evaluation Codes:   1) History comprised of:   Personal factors that impact the plan of care:      None.    Comorbidity factors that impact the plan of care are:      None.     Medications impacting care: None.  2) Examination of Body Systems comprised of:   Body structures and functions that impact the plan of care:      Pelvis and Sacral illiac joint.   Activity limitations that impact the plan of care are:      Stairs, Walking and Sleeping.  3) Clinical presentation characteristics are:   Stable/Uncomplicated.  4) Decision-Making    Low complexity using standardized patient assessment instrument and/or measureable assessment of functional outcome.  Cumulative Therapy Evaluation is: Low complexity.    Previous and current functional limitations:  (See Goal Flow Sheet for this information)    Short term and Long term goals: (See Goal Flow Sheet for this information)     Communication ability:  Patient appears to be able to clearly communicate and understand verbal and written communication and follow directions correctly.  Treatment Explanation - The following has been discussed with the patient:   RX ordered/plan of care  Anticipated outcomes  Possible risks and side effects  This patient would benefit from PT intervention to resume normal activities.   Rehab potential is good.    Frequency:  1 X week, once daily  Duration:  for 6 weeks  Discharge Plan:  Achieve all LTG.  Independent in home treatment  program.  Reach maximal therapeutic benefit.    Please refer to the daily flowsheet for treatment today, total treatment time and time spent performing 1:1 timed codes.

## 2018-08-22 NOTE — PROGRESS NOTES
PROBLEM LIST  LABS:Opos/RNI  GIRL    1. Cf carrier: partner declined testing.  2. Hx of atrial septal defect, status post repair. Plan: fetal echo in [facility] (scheduled for 5/18)  3. Rubella nonimmune: MMR after delivery.    Lots of lower abdominal/pelvic and low back pain: was referred to physical therapy. Group B Strep next time.    Kelsi Jane MD

## 2018-08-22 NOTE — PROGRESS NOTES
[facility] Initial Evaluation  Subjective:  Patient is a 28 year old female presenting with rehab left ankle/foot hpi.                                      Pertinent medical history includes:  Currently pregnant.        Current occupation is Teller.    Primary job tasks include:  Prolonged sitting (computer work).                                Objective:  System    Physical Exam    General     ROS    Assessment/Plan:

## 2018-08-22 NOTE — NURSING NOTE
"Chief Complaint   Patient presents with     Prenatal Care       Initial /70  Wt 174 lb (78.9 kg)  LMP 2017  Breastfeeding? No  BMI 30.98 kg/m2 Estimated body mass index is 30.98 kg/(m^2) as calculated from the following:    Height as of 18: 5' 2.84\" (1.596 m).    Weight as of this encounter: 174 lb (78.9 kg).  BP completed using cuff size: regular      35w0d  + FM daily  + back pain  + cramping  - bleeding or leaking of fluid  Caitlin Johnson LPN               "

## 2018-08-22 NOTE — MR AVS SNAPSHOT
After Visit Summary   8/22/2018    Ofelia Will    MRN: [id number]           Patient Information     Date Of Birth          1990        Visit Information        Provider Department      8/22/2018 3:45 PM Kelsi Jane MD [facility]        Today's Diagnoses     Encounter for supervision of normal first pregnancy in third trimester    -  1       Follow-ups after your visit        Your next 10 appointments already scheduled     Aug 28, 2018  4:20 PM CDT   DISHA For Women Only with Liviermery Contreras PT   [facility] ([facility]    [address] [facility] [address]   [phone]            Aug 29, 2018  3:15 PM CDT   ESTABLISHED PRENATAL with Kelsi Jane MD   [facility] ([facility])    [address] [place] [address]   [phone]            Sep 04, 2018  4:20 PM CDT   DISHA For Women Only with Liviermery Contreras PT   [facility] ([facility]    [address] [facility] [address]   [phone]            Sep 05, 2018  3:00 PM CDT   ESTABLISHED PRENATAL with Kelsi Jane MD   [facility] ([facility])    [address] [place] [address]   [phone]            Sep 12, 2018  3:30 PM CDT   ESTABLISHED PRENATAL with Kelsi Jane MD   [facility] ([facility])    [address] [place] [address]   [phone]            Sep 19, 2018  3:30 PM CDT   ESTABLISHED PRENATAL with Kelsi Jane MD   [facility] ([facility])    [address] [place] [address]   [phone]            Sep 26, 2018  3:30 PM CDT   ESTABLISHED PRENATAL with Kelsi Jane MD   [facility] ([facility])    [address] [place] [address]    [phone]              Who to contact     If you have questions or need follow up information about today's clinic visit or your schedule please contact [facility] directly at [phone].  Normal or non-critical lab and imaging results will be communicated to you by MyChart, letter or phone within 4 business days after the clinic has received the results. If you do not hear from us within 7 days, please contact the clinic through MyChart or phone. If you have a critical or abnormal lab result, we will notify you by phone as soon as possible.  Submit refill requests through [employer] or call your pharmacy and they will forward the refill request to us. Please allow 3 business days for your refill to be completed.          Additional Information About Your Visit        [employer] Information     [employer] gives you secure access to your electronic health record. If you see a primary care provider, you can also send messages to your care team and make appointments. If you have questions, please call your primary care clinic.  If you do not have a primary care provider, please call [phone] and they will assist you.        Care EveryWhere ID     This is your Care EveryWhere ID. This could be used by other organizations to access your [place] medical records  [id number]        Your Vitals Were     Last Period Breastfeeding? BMI (Body Mass Index)             12/20/2017 No 30.98 kg/m2          Blood Pressure from Last 3 Encounters:   08/22/18 106/70   08/08/18 108/66   07/24/18 112/64    Weight from Last 3 Encounters:   08/22/18 174 lb (78.9 kg)   08/08/18 171 lb (77.6 kg)   07/24/18 167 lb (75.8 kg)              Today, you had the following     No orders found for display       Primary Care Provider Office Phone # Fax #    Guerda Esquivel [phone] [address] [facility] [address]        Equal Access to Services     MAHIN STEARNS AH: martínez Agustin  negro stokesquangjade arambulaviki hughes vernellnicola gonzalez. So [facility] [phone].    ATENCIÓN: Si raisa yoder, tiene a ferris disposición servicios gratuitos de asistencia lingüística. Joaquim al [phone].    We comply with applicable federal civil rights laws and [place] laws. We do not discriminate on the basis of race, color, national origin, age, disability, sex, sexual orientation, or gender identity.            Thank you!     Thank you for choosing [facility]  for your care. Our goal is always to provide you with excellent care. Hearing back from our patients is one way we can continue to improve our services. Please take a few minutes to complete the written survey that you may receive in the mail after your visit with us. Thank you!             Your Updated Medication List - Protect others around you: Learn how to safely use, store and throw away your medicines at www.disposemymeds.org.          This list is accurate as of 8/22/18  3:56 PM.  Always use your most recent med list.                   Brand Name Dispense Instructions for use Diagnosis    prenatal multivitamin plus iron 27-0.8 MG Tabs per tablet     100 tablet    Take 1 tablet by mouth daily    Supervision of normal first pregnancy, antepartum       ranitidine 75 MG tablet    ZANTAC    30 tablet    Take 2 tablets (150 mg) by mouth 2 times daily    Gastroesophageal reflux disease without esophagitis

## 2018-08-28 ENCOUNTER — THERAPY VISIT (OUTPATIENT)
Dept: PHYSICAL THERAPY | Facility: CLINIC | Age: 28
End: 2018-08-28
Payer: COMMERCIAL

## 2018-08-28 DIAGNOSIS — M53.3 SACROILIAC JOINT PAIN: ICD-10-CM

## 2018-08-28 DIAGNOSIS — R10.2 PELVIC PAIN IN FEMALE: ICD-10-CM

## 2018-08-28 DIAGNOSIS — Z33.1 PREGNANCY, INCIDENTAL: ICD-10-CM

## 2018-08-28 PROCEDURE — 97140 MANUAL THERAPY 1/> REGIONS: CPT | Mod: GP | Performed by: PHYSICAL THERAPIST

## 2018-08-28 PROCEDURE — 97110 THERAPEUTIC EXERCISES: CPT | Mod: GP | Performed by: PHYSICAL THERAPIST

## 2018-08-28 PROCEDURE — 97112 NEUROMUSCULAR REEDUCATION: CPT | Mod: GP | Performed by: PHYSICAL THERAPIST

## 2018-08-29 ENCOUNTER — HOSPITAL ENCOUNTER (OUTPATIENT)
Facility: CLINIC | Age: 28
Discharge: HOME | End: 2018-08-29
Attending: OBSTETRICS & GYNECOLOGY | Admitting: OBSTETRICS & GYNECOLOGY
Payer: COMMERCIAL

## 2018-08-29 ENCOUNTER — MYC MEDICAL ADVICE (OUTPATIENT)
Dept: OBGYN | Facility: CLINIC | Age: 28
End: 2018-08-29

## 2018-08-29 ENCOUNTER — PRENATAL OFFICE VISIT (OUTPATIENT)
Dept: OBGYN | Facility: CLINIC | Age: 28
End: 2018-08-29
Payer: COMMERCIAL

## 2018-08-29 VITALS
SYSTOLIC BLOOD PRESSURE: 124 MMHG | RESPIRATION RATE: 18 BRPM | DIASTOLIC BLOOD PRESSURE: 82 MMHG | TEMPERATURE: 98.5 F | HEART RATE: 105 BPM | WEIGHT: 176 LBS | HEIGHT: 62 IN | BODY MASS INDEX: 32.39 KG/M2

## 2018-08-29 VITALS — DIASTOLIC BLOOD PRESSURE: 80 MMHG | SYSTOLIC BLOOD PRESSURE: 122 MMHG | WEIGHT: 176 LBS | BODY MASS INDEX: 31.34 KG/M2

## 2018-08-29 DIAGNOSIS — Z34.83 ENCOUNTER FOR SUPERVISION OF OTHER NORMAL PREGNANCY IN THIRD TRIMESTER: Primary | ICD-10-CM

## 2018-08-29 LAB
FERN CRYSTALLIZATION: NEGATIVE
RUPTURE OF FETAL MEMBRANES BY ROM PLUS: NEGATIVE

## 2018-08-29 PROCEDURE — G0463 HOSPITAL OUTPT CLINIC VISIT: HCPCS | Mod: 25

## 2018-08-29 PROCEDURE — 87653 STREP B DNA AMP PROBE: CPT | Performed by: OBSTETRICS & GYNECOLOGY

## 2018-08-29 PROCEDURE — 84112 EVAL AMNIOTIC FLUID PROTEIN: CPT | Performed by: OBSTETRICS & GYNECOLOGY

## 2018-08-29 PROCEDURE — 87186 SC STD MICRODIL/AGAR DIL: CPT | Performed by: OBSTETRICS & GYNECOLOGY

## 2018-08-29 PROCEDURE — 59025 FETAL NON-STRESS TEST: CPT

## 2018-08-29 PROCEDURE — 99207 ZZC PRENATAL VISIT: CPT | Performed by: OBSTETRICS & GYNECOLOGY

## 2018-08-29 RX ORDER — ONDANSETRON 2 MG/ML
4 INJECTION INTRAMUSCULAR; INTRAVENOUS EVERY 6 HOURS PRN
Status: DISCONTINUED | OUTPATIENT
Start: 2018-08-29 | End: 2018-08-29 | Stop reason: HOSPADM

## 2018-08-29 NOTE — TELEPHONE ENCOUNTER
Pt is 36w0d and thinks she has been having a little leaking of fluid since this morning. She just noticed a little moisture on her underwear this morning. Good fetal movement. No vaginal bleeding. A little bit of [place] cxns and cramps, but not today. Instructed her to put on a pad and lay down for 30 minutes and then stand up. She is to call back right away if she has any leaking.        Malini Albert RN

## 2018-08-29 NOTE — IP AVS SNAPSHOT
[facility] Labor and Delivery    [address]    [facility] [address]    Phone:  [phone]    Fax:  [phone]                                       After Visit Summary   8/29/2018    Ofelia Will    MRN: [id number]           After Visit Summary Signature Page     I have received my discharge instructions, and my questions have been answered. I have discussed any challenges I see with this plan with the nurse or doctor.    ..........................................................................................................................................  Patient/Patient Representative Signature      ..........................................................................................................................................  Patient Representative Print Name and Relationship to Patient    ..................................................               ................................................  Date                                            Time    ..........................................................................................................................................  Reviewed by Signature/Title    ...................................................              ..............................................  Date                                                            Time          22EPIC Rev 08/18

## 2018-08-29 NOTE — PLAN OF CARE
Data: Patient presented to [facility]place: 2018  3:58 PM.  Reason for maternal/fetal assessment is leaking vaginal fluid. Patient reports that she thinks that her water may have broke this morning at 0800.  Patient is a .  Prenatal record reviewed. Pregnancy has been uncomplicated..  Gestational Age 36w0d. VSS. Fetal movement present. Patient denies vaginal bleeding, abdominal pain, pelvic pressure, nausea, vomiting, headache, visual disturbances, epigastric or URQ pain, significant edema. Support person is present.   Action: Verbal consent for EFM. Triage assessment completed. Bill of rights reviewed.  Response: Patient verbalized agreement with plan. Will contact Dr Johan Reyes with update and further orders.

## 2018-08-29 NOTE — PLAN OF CARE
Data: Patient assessed in the Birthplace for leaking vaginal fluid.  Cervical exam closed and effaced 30-50%.  Membranes intact.  Contractions irritable.  Action:  Presumed adequate fetal oxygenation documented (see flow record). Discharge instructions reviewed.  Patient instructed to report change in fetal movement, vaginal leaking of fluid or bleeding, abdominal pain, or any concerns related to the pregnancy to her nurse/physician.    Response: Orders to discharge home per Johan Reyes.  Patient verbalized understanding of education and verbalized agreement with plan. Discharged to home at 1752.

## 2018-08-29 NOTE — DISCHARGE INSTRUCTIONS
Discharge Instruction for Undelivered Patients      You were seen for: Membrane Assessment  We Consulted: Dr. Reyes  You had (Test or Medicine):fetal monitoring, FERN and ROM+, cervical exam.     Diet:   Drink 8 to 12 glasses of liquids (milk, juice, water) every day.  You may eat meals and snacks.     Activity:  Count fetal kicks everyday (see handout)  Call your doctor or nurse midwife if your baby is moving less than usual.     Call your provider if you notice:  Swelling in your face or increased swelling in your hands or legs.  Headaches that are not relieved by Tylenol (acetaminophen).  Changes in your vision (blurring: seeing spots or stars.)  Nausea (sick to your stomach) and vomiting (throwing up).   Weight gain of 5 pounds or more per week.  Heartburn that doesn't go away.  Signs of bladder infection: pain when you urinate (use the toilet), need to go more often and more urgently.  The bag of joaquin (rupture of membranes) breaks, or you notice leaking in your underwear.  Bright red blood in your underwear.  Abdominal (lower belly) or stomach pain.  For first baby: Contractions (tightening) less than 5 minutes apart for one hour or more.  Increase or change in vaginal discharge (note the color and amount)  Other: May take extra strength tylenol as needed, warm bath or shower.    Follow-up:  As scheduled in the clinic

## 2018-08-29 NOTE — IP AVS SNAPSHOT
MRN:[id number]                      After Visit Summary   8/29/2018    Ofelia Will    MRN: [id number]           Thank you!     Thank you for choosing [facility] for your care. Our goal is always to provide you with excellent care. Hearing back from our patients is one way we can continue to improve our services. Please take a few minutes to complete the written survey that you may receive in the mail after you visit. If you would like to speak to someone directly about your visit please contact Patient Relations at [phone]. Thank you!          Patient Information     Date Of Birth          1990        About your hospital stay     You were admitted on:  August 29, 2018 You last received care in the:  [facility] Labor and Delivery    You were discharged on:  August 29, 2018       Who to Call     For medical emergencies, please call 911.  For non-urgent questions about your medical care, please call your primary care provider or clinic, [phone]          Attending Provider     Provider Specialty    Johan Reyes MD OB/Gyn       Primary Care Provider Office Phone # Fax #    Guerda Esquivel [phone]      Your next 10 appointments already scheduled     Sep 04, 2018  4:20 PM CDT   DISHA For Women Only with Livier Contreras PT   [facility] [place] [facility]    [address] [facility] [address] [phone]            Sep 05, 2018  3:00 PM CDT   ESTABLISHED PRENATAL with Kelsi Jane MD   [facility] ([facility])    [address] [facility] [address] [phone]            Sep 12, 2018  3:30 PM CDT   ESTABLISHED PRENATAL with Kelsi Jane MD   [facility] ([facility])    [address] [facility] [address] [phone]            Sep 19, 2018  3:30 PM CDT   ESTABLISHED PRENATAL with Kelsi  WILLIAN Jane MD   [facility] ([facility])    [address] [facility] [address]   [phone]            Sep 26, 2018  3:30 PM CDT   ESTABLISHED PRENATAL with Kelsi Jane MD   [facility] ([facility])    [address] [facility] [address]   [phone]              Further instructions from your care team       Discharge Instruction for Undelivered Patients      You were seen for: Membrane Assessment  We Consulted: Dr. Reyes  You had (Test or Medicine):fetal monitoring, FERN and ROM+, cervical exam.     Diet:   Drink 8 to 12 glasses of liquids (milk, juice, water) every day.  You may eat meals and snacks.     Activity:  Count fetal kicks everyday (see handout)  Call your doctor or nurse midwife if your baby is moving less than usual.     Call your provider if you notice:  Swelling in your face or increased swelling in your hands or legs.  Headaches that are not relieved by Tylenol (acetaminophen).  Changes in your vision (blurring: seeing spots or stars.)  Nausea (sick to your stomach) and vomiting (throwing up).   Weight gain of 5 pounds or more per week.  Heartburn that doesn't go away.  Signs of bladder infection: pain when you urinate (use the toilet), need to go more often and more urgently.  The bag of joaquin (rupture of membranes) breaks, or you notice leaking in your underwear.  Bright red blood in your underwear.  Abdominal (lower belly) or stomach pain.  For first baby: Contractions (tightening) less than 5 minutes apart for one hour or more.  Increase or change in vaginal discharge (note the color and amount)  Other: May take extra strength tylenol as needed, warm bath or shower.    Follow-up:  As scheduled in the clinic          Pending Results     Date and Time Order Name Status Description    8/29/2018 1605 GROUP B STREP PCR In process             Admission Information     Date & Time  "Provider Department Dept. Phone    8/29/2018 Johan Reyes MD [facility] Labor and Delivery [phone]      Your Vitals Were     Blood Pressure Pulse Temperature Respirations Height Weight    124/82 105 98.5  F (36.9  C) (Oral) 18 1.575 m (5' 2\") 79.8 kg (176 lb)    Last Period BMI (Body Mass Index)                12/20/2017 32.19 kg/m2          [employer] Information     [employer] gives you secure access to your electronic health record. If you see a primary care provider, you can also send messages to your care team and make appointments. If you have questions, please call your primary care clinic.  If you do not have a primary care provider, please call [phone] and they will assist you.        Care EveryWhere ID     This is your Care EveryWhere ID. This could be used by other organizations to access your [place] medical records  [id number]        Equal Access to Services     MAHIN STEARNS AH: Hadii cierra [place]thing[place], wablaine stokes, qaybta kaalmada sari, viki camara . So [facility] [phone].    ATENCIÓN: Si habla español, tiene a ferris disposición servicios gratuitos de asistencia lingüística. Llame al [phone].    We comply with applicable federal civil rights laws and [place] laws. We do not discriminate on the basis of race, color, national origin, age, disability, sex, sexual orientation, or gender identity.               Review of your medicines      UNREVIEWED medicines. Ask your doctor about these medicines        Dose / Directions    prenatal multivitamin plus iron 27-0.8 MG Tabs per tablet   Used for:  Supervision of normal first pregnancy, antepartum        Dose:  1 tablet   Take 1 tablet by mouth daily   Quantity:  100 tablet   Refills:  3       ranitidine 75 MG tablet   Commonly known as:  ZANTAC   Used for:  Gastroesophageal reflux disease without esophagitis        Dose:  150 mg   Take 2 tablets (150 mg) by mouth 2 times daily   Quantity:  " 30 tablet   Refills:  3                Protect others around you: Learn how to safely use, store and throw away your medicines at www.disposemymeds.org.             Medication List: This is a list of all your medications and when to take them. Check marks below indicate your daily home schedule. Keep this list as a reference.      Medications           Morning Afternoon Evening Bedtime As Needed    prenatal multivitamin plus iron 27-0.8 MG Tabs per tablet   Take 1 tablet by mouth daily                                ranitidine 75 MG tablet   Commonly known as:  ZANTAC   Take 2 tablets (150 mg) by mouth 2 times daily

## 2018-08-29 NOTE — PROGRESS NOTES
PROBLEM LIST  LABS:Opos/RNI  GIRL    1. Cf carrier: partner declined testing.  2. Hx of atrial septal defect, status post repair. Plan: fetal echo in [facility] (scheduled for 5/18)  3. Rubella nonimmune: MMR after delivery.    She had some clear fluid leakage at 800, has had several additional times through the day. Group B Strep done, but without microscope or ROM plus I decided to send her to L&D for evaluation for rule out PPROM. If not ruptured, follow up with me in 1 week. I did NOT check her cervix today.    Kelsi Jane MD

## 2018-08-29 NOTE — NURSING NOTE
"Chief Complaint   Patient presents with     Prenatal Care       Initial /80  Wt 176 lb (79.8 kg)  LMP 2017  Breastfeeding? No  BMI 31.34 kg/m2 Estimated body mass index is 31.34 kg/(m^2) as calculated from the following:    Height as of 18: 5' 2.84\" (1.596 m).    Weight as of this encounter: 176 lb (79.8 kg).  BP completed using cuff size: regular        36w0d  + FM daily  - bleeding  ? Leaking of fluid  + cramping and BH contractions  + mild edema   - headache   Caitlin Johnson LPN               "

## 2018-08-29 NOTE — PROVIDER NOTIFICATION
08/29/18 1739   Provider Notification   Provider Name/Title Dr. Reyes   Method of Notification Phone   Request Evaluate - Remote   Notification Reason Membrane Status;Uterine Activity;SVE   Dr. Reyes notified of Category 1 FHR tracing, negative ROM + and negative Fern results.  Pt is ivett every 1.5-2.5 minutes, irritable but just feel like tightening not pain. SVE was closed/40/-4/  Md gave orders to discharge pt to home.

## 2018-08-29 NOTE — TELEPHONE ENCOUNTER
calling to report.  Wife is still at work and unable to lie down.  Has noticed small amt of clear fluid in underwear. Not sure if urine.  Recommend keep appt today and if leaking increases to call back and will send to L&D to r/o PROM.  Jailene Lorenzo RN

## 2018-08-29 NOTE — MR AVS SNAPSHOT
After Visit Summary   8/29/2018    Ofelia Will    MRN: [id number]           Patient Information     Date Of Birth          1990        Visit Information        Provider Department      8/29/2018 3:15 PM Kelsi Jane MD [facility]        Today's Diagnoses     Encounter for supervision of other normal pregnancy in third trimester    -  1       Follow-ups after your visit        Your next 10 appointments already scheduled     Sep 04, 2018  4:20 PM CDT   DISHA For Women Only with Livier Contreras, CHIKA   [place] [facility] [place] ([facility] [place]    [address] [address]  [facility] [address]   [phone]            Sep 05, 2018  3:00 PM CDT   ESTABLISHED PRENATAL with Kelsi Jane MD   [facility] ([facility])    [address] [place] [id number] [phone]            Sep 12, 2018  3:30 PM CDT   ESTABLISHED PRENATAL with Kelsi Jane MD   [facility] ([facility])    [address] [place] [address] [phone]            Sep 19, 2018  3:30 PM CDT   ESTABLISHED PRENATAL with Kelsi Jane MD   [facility] ([facility])    [address] [place] [id number] [phone]            Sep 26, 2018  3:30 PM CDT   ESTABLISHED PRENATAL with Kelsi Jane MD   [facility] ([facility])    [address] [place] [id number] [phone]              Who to contact     If you have questions or need follow up information about today's clinic visit or your schedule please contact [facility] directly at [phone].  Normal or non-critical lab and imaging results will be communicated to you by MyChart, letter or phone within 4 business days after the clinic has received the results. If you do not hear from us within 7  days, please contact the clinic through [employer] or phone. If you have a critical or abnormal lab result, we will notify you by phone as soon as possible.  Submit refill requests through [employer] or call your pharmacy and they will forward the refill request to us. Please allow 3 business days for your refill to be completed.          Additional Information About Your Visit        [employer] Information     [employer] gives you secure access to your electronic health record. If you see a primary care provider, you can also send messages to your care team and make appointments. If you have questions, please call your primary care clinic.  If you do not have a primary care provider, please call [phone] and they will assist you.        Care EveryWhere ID     This is your Care EveryWhere ID. This could be used by other organizations to access your [place] medical records  [id number]        Your Vitals Were     Last Period Breastfeeding? BMI (Body Mass Index)             12/20/2017 No 31.34 kg/m2          Blood Pressure from Last 3 Encounters:   08/29/18 122/80   08/22/18 106/70   08/08/18 108/66    Weight from Last 3 Encounters:   08/29/18 176 lb (79.8 kg)   08/22/18 174 lb (78.9 kg)   08/08/18 171 lb (77.6 kg)              We Performed the Following     Group B strep PCR        Primary Care Provider Office Phone # Fax #    Guerda Esquivel [phone] [address]  [facility] [address]        Equal Access to Services     MAHIN STEARNS : Hadii cierra ku hadshraddhao Sokg, waaxda luqadaha, qaybta kaalmada sari, viki gonzalez. So [facility] [phone].    ATENCIÓN: Si habla español, tiene a ferris disposición servicios gratuitos de asistencia lingüística. Llame al [phone].    We comply with applicable federal civil rights laws and [place] laws. We do not discriminate on the basis of race, color, national origin, age, disability, sex, sexual orientation, or gender  identity.            Thank you!     Thank you for choosing [facility]  for your care. Our goal is always to provide you with excellent care. Hearing back from our patients is one way we can continue to improve our services. Please take a few minutes to complete the written survey that you may receive in the mail after your visit with us. Thank you!             Your Updated Medication List - Protect others around you: Learn how to safely use, store and throw away your medicines at www.disposemymeds.org.          This list is accurate as of 8/29/18  3:58 PM.  Always use your most recent med list.                   Brand Name Dispense Instructions for use Diagnosis    prenatal multivitamin plus iron 27-0.8 MG Tabs per tablet     100 tablet    Take 1 tablet by mouth daily    Supervision of normal first pregnancy, antepartum       ranitidine 75 MG tablet    ZANTAC    30 tablet    Take 2 tablets (150 mg) by mouth 2 times daily    Gastroesophageal reflux disease without esophagitis

## 2018-08-31 LAB
GP B STREP DNA SPEC QL NAA+PROBE: POSITIVE
SPECIMEN SOURCE: ABNORMAL

## 2018-09-04 LAB
BACTERIA SPEC CULT: ABNORMAL
SPECIMEN SOURCE: ABNORMAL

## 2018-09-05 ENCOUNTER — PRENATAL OFFICE VISIT (OUTPATIENT)
Dept: OBGYN | Facility: CLINIC | Age: 28
End: 2018-09-05
Payer: COMMERCIAL

## 2018-09-05 VITALS — DIASTOLIC BLOOD PRESSURE: 80 MMHG | SYSTOLIC BLOOD PRESSURE: 128 MMHG | BODY MASS INDEX: 32.56 KG/M2 | WEIGHT: 178 LBS

## 2018-09-05 DIAGNOSIS — Z34.03 ENCOUNTER FOR SUPERVISION OF NORMAL FIRST PREGNANCY IN THIRD TRIMESTER: Primary | ICD-10-CM

## 2018-09-05 DIAGNOSIS — Z23 NEED FOR PROPHYLACTIC VACCINATION AND INOCULATION AGAINST INFLUENZA: ICD-10-CM

## 2018-09-05 PROCEDURE — 90471 IMMUNIZATION ADMIN: CPT | Performed by: OBSTETRICS & GYNECOLOGY

## 2018-09-05 PROCEDURE — 99207 ZZC PRENATAL VISIT: CPT | Performed by: OBSTETRICS & GYNECOLOGY

## 2018-09-05 PROCEDURE — 90686 IIV4 VACC NO PRSV 0.5 ML IM: CPT | Performed by: OBSTETRICS & GYNECOLOGY

## 2018-09-05 NOTE — PROGRESS NOTES
PROBLEM LIST  LABS:Opos/RNI  GIRL    1. Cf carrier: partner declined testing.  2. Hx of atrial septal defect, status post repair. Plan: fetal echo in [facility] (scheduled for 5/18)  3. Rubella nonimmune: MMR after delivery.    Group B Strep positive, discussed. Ruled out for leakage of fluid last week. No new symptoms this week. Flu shot given. Discussed kick counts and signs and symptoms of labor.    Kelsi Jane MD

## 2018-09-05 NOTE — MR AVS SNAPSHOT
After Visit Summary   9/5/2018    Ofelia Will    MRN: [id number]           Patient Information     Date Of Birth          1990        Visit Information        Provider Department      9/5/2018 3:00 PM Kelsi Jane MD [facility]        Today's Diagnoses     Encounter for supervision of normal first pregnancy in third trimester    -  1    Need for prophylactic vaccination and inoculation against influenza           Follow-ups after your visit        Your next 10 appointments already scheduled     Sep 12, 2018  3:30 PM CDT   ESTABLISHED PRENATAL with Kelsi Jane MD   [facility]    [address] [place] [address]   [phone]            Sep 19, 2018  3:30 PM CDT   ESTABLISHED PRENATAL with Kelsi Jane MD   [facility]    [address] [place] [id number] [phone]            Sep 26, 2018  3:30 PM CDT   ESTABLISHED PRENATAL with Kelsi Jane MD   [facility] ([facility]    [address] [place] [address] [phone]              Who to contact     If you have questions or need follow up information about today's clinic visit or your schedule please contact [facility] directly at [phone].  Normal or non-critical lab and imaging results will be communicated to you by MyChart, letter or phone within 4 business days after the clinic has received the results. If you do not hear from us within 7 days, please contact the clinic through MyChart or phone. If you have a critical or abnormal lab result, we will notify you by phone as soon as possible.  Submit refill requests through CloudOne or call your pharmacy and they will forward the refill request to us. Please allow 3 business days for your refill to be completed.           Additional Information About Your Visit        Wizerhart Information     YouGotListings gives you secure access to your electronic health record. If you see a primary care provider, you can also send messages to your care team and make appointments. If you have questions, please call your primary care clinic.  If you do not have a primary care provider, please call [phone] and they will assist you.        Care EveryWhere ID     This is your Care EveryWhere ID. This could be used by other organizations to access your [place] medical records  [id number]        Your Vitals Were     Last Period Breastfeeding? BMI (Body Mass Index)             12/20/2017 No 32.56 kg/m2          Blood Pressure from Last 3 Encounters:   09/05/18 128/80   08/29/18 124/82   08/29/18 122/80    Weight from Last 3 Encounters:   09/05/18 178 lb (80.7 kg)   08/29/18 176 lb (79.8 kg)   08/29/18 176 lb (79.8 kg)              We Performed the Following     FLU VACCINE, SPLIT VIRUS, IM (QUADRIVALENT) [97300]- >3 YRS     Vaccine Administration, Initial [23511]        Primary Care Provider Office Phone # Fax #    Guerda Esquivel [phone] [address] [id number]        Equal Access to Services     MAHIN STEARNS : Hadii aad ku hadasho Soomaali, waaxda luqadaha, qaybta kaalmada adeegyada, waxay vera gonzalez. So [facility] [phone].    ATENCIÓN: Si habla español, tiene a ferris disposición servicios gratuitos de asistencia lingüística. Llame al [phone].    We comply with applicable federal civil rights laws and [place] laws. We do not discriminate on the basis of race, color, national origin, age, disability, sex, sexual orientation, or gender identity.            Thank you!     Thank you for choosing [facility]  for your care. Our goal is always to provide you with excellent care. Hearing back from our patients is one way we can continue to improve our services. Please take a few  minutes to complete the written survey that you may receive in the mail after your visit with us. Thank you!             Your Updated Medication List - Protect others around you: Learn how to safely use, store and throw away your medicines at www.disposemymeds.org.          This list is accurate as of 9/5/18  3:37 PM.  Always use your most recent med list.                   Brand Name Dispense Instructions for use Diagnosis    prenatal multivitamin plus iron 27-0.8 MG Tabs per tablet     100 tablet    Take 1 tablet by mouth daily    Supervision of normal first pregnancy, antepartum       ranitidine 75 MG tablet    ZANTAC    30 tablet    Take 2 tablets (150 mg) by mouth 2 times daily    Gastroesophageal reflux disease without esophagitis

## 2018-09-05 NOTE — NURSING NOTE
"Chief Complaint   Patient presents with     Prenatal Care       Initial /80  Wt 178 lb (80.7 kg)  LMP 2017  Breastfeeding? No  BMI 32.56 kg/m2 Estimated body mass index is 32.56 kg/(m^2) as calculated from the following:    Height as of 18: 5' 2\" (1.575 m).    Weight as of this encounter: 178 lb (80.7 kg).  BP completed using cuff size: regular        37w0d  + FM daily  + contractions  - bleeding or leaking of fluid  - headache   + edema in hands/feet  Caitlin Johnson LPN               "

## 2018-09-05 NOTE — PROGRESS NOTES

## 2018-09-09 ENCOUNTER — NURSE TRIAGE (OUTPATIENT)
Dept: NURSING | Facility: CLINIC | Age: 28
End: 2018-09-09

## 2018-09-09 ENCOUNTER — HOSPITAL ENCOUNTER (OUTPATIENT)
Facility: CLINIC | Age: 28
Discharge: HOME | End: 2018-09-09
Attending: OBSTETRICS & GYNECOLOGY | Admitting: OBSTETRICS & GYNECOLOGY
Payer: COMMERCIAL

## 2018-09-09 VITALS — RESPIRATION RATE: 18 BRPM | TEMPERATURE: 98.8 F | SYSTOLIC BLOOD PRESSURE: 128 MMHG | DIASTOLIC BLOOD PRESSURE: 80 MMHG

## 2018-09-09 PROCEDURE — 59025 FETAL NON-STRESS TEST: CPT

## 2018-09-09 PROCEDURE — 59025 FETAL NON-STRESS TEST: CPT | Mod: 26 | Performed by: OBSTETRICS & GYNECOLOGY

## 2018-09-09 PROCEDURE — 40000809 ZZH STATISTIC NO DOCUMENTATION TO SUPPORT CHARGE

## 2018-09-09 PROCEDURE — G0463 HOSPITAL OUTPT CLINIC VISIT: HCPCS | Mod: 25

## 2018-09-09 NOTE — IP AVS SNAPSHOT
MRN:[id number]                      After Visit Summary   9/9/2018    Ofelia Will    MRN: [id number]           Thank you!     Thank you for choosing [facility] for your care. Our goal is always to provide you with excellent care. Hearing back from our patients is one way we can continue to improve our services. Please take a few minutes to complete the written survey that you may receive in the mail after you visit. If you would like to speak to someone directly about your visit please contact Patient Relations at [phone]. Thank you!          Patient Information     Date Of Birth          1990        About your hospital stay     You were admitted on:  September 9, 2018 You last received care in the:  [facility] Labor and Delivery    You were discharged on:  September 9, 2018       Who to Call     For medical emergencies, please call 911.  For non-urgent questions about your medical care, please call your primary care provider or clinic, [phone]          Attending Provider     Provider Specialty    Johan Reyes MD OB/Gyn       Primary Care Provider Office Phone # Fax #    Guerda Esquivel [phone]      Your next 10 appointments already scheduled     Sep 12, 2018  3:30 PM CDT   ESTABLISHED PRENATAL with Kelsi Jane MD   [facility]    [address] [place] [address]   [phone]            Sep 19, 2018  3:30 PM CDT   ESTABLISHED PRENATAL with Kelsi Jane MD   [facility]    [address] [address] [address]   [phone]            Sep 26, 2018  3:30 PM CDT   ESTABLISHED PRENATAL with Kelsi Jane MD   [facility]    [address] [id number] [phone]              Further instructions from  your care team       Discharge Instruction for Undelivered Patients      You were seen for: Labor Assessment and Fetal Assessment  We Consulted: Dr Reyes  You had (Test or Medicine):Non-stress Test     Diet:   Drink 8 to 12 glasses of liquids (milk, juice, water) every day.  You may eat meals and snacks.     Activity:  Call your doctor or nurse midwife if your baby is moving less than usual.     Call your provider if you notice:  Swelling in your face or increased swelling in your hands or legs.  Headaches that are not relieved by Tylenol (acetaminophen).  Changes in your vision (blurring: seeing spots or stars.)  Nausea (sick to your stomach) and vomiting (throwing up).   Weight gain of 5 pounds or more per week.  Heartburn that doesn't go away.  Signs of bladder infection: pain when you urinate (use the toilet), need to go more often and more urgently.  The bag of joaquin (rupture of membranes) breaks, or you notice leaking in your underwear.  Bright red blood in your underwear.  Abdominal (lower belly) or stomach pain.  For first baby: Contractions (tightening) less than 5 minutes apart for one hour or more.  Increase or change in vaginal discharge (note the color and amount)    Follow-up:  As scheduled in the clinic          Pending Results     No orders found from 9/7/2018 to 9/10/2018.            Admission Information     Date & Time Provider Department Dept. Phone    9/9/2018 Johan Reyes MD [facility] Labor and Delivery [phone]      Your Vitals Were     Blood Pressure Temperature Respirations Last Period          128/80 98.8  F (37.1  C) (Oral) 18 12/20/2017        Reflect Systems Information     Ref[employer] Systems gives you secure access to your electronic health record. If you see a primary care provider, you can also send messages to your care team and make appointments. If you have questions, please call your primary care clinic.  If you do not have a primary care provider, please call [phone] and  they will assist you.        Care EveryWhere ID     This is your Care EveryWhere ID. This could be used by other organizations to access your [place] medical records  [id number]        Equal Access to Services     MAHIN STEARNS : Frida Worthington, wagersonda lumohitadaha, qaybta kaalmada sari, viki vera dustinsudhakar valdiviasheryl monte hoa gonzalez. So [facility] [phone].    ATENCIÓN: Si habla español, tiene a ferris disposición servicios gratuitos de asistencia lingüística. Llame al [phone].    We comply with applicable federal civil rights laws and [place] laws. We do not discriminate on the basis of race, color, national origin, age, disability, sex, sexual orientation, or gender identity.               Review of your medicines      UNREVIEWED medicines. Ask your doctor about these medicines        Dose / Directions    prenatal multivitamin plus iron 27-0.8 MG Tabs per tablet   Used for:  Supervision of normal first pregnancy, antepartum        Dose:  1 tablet   Take 1 tablet by mouth daily   Quantity:  100 tablet   Refills:  3       ranitidine 75 MG tablet   Commonly known as:  ZANTAC   Used for:  Gastroesophageal reflux disease without esophagitis        Dose:  150 mg   Take 2 tablets (150 mg) by mouth 2 times daily   Quantity:  30 tablet   Refills:  3                Protect others around you: Learn how to safely use, store and throw away your medicines at www.disposemymeds.org.             Medication List: This is a list of all your medications and when to take them. Check marks below indicate your daily home schedule. Keep this list as a reference.      Medications           Morning Afternoon Evening Bedtime As Needed    prenatal multivitamin plus iron 27-0.8 MG Tabs per tablet   Take 1 tablet by mouth daily                                ranitidine 75 MG tablet   Commonly known as:  ZANTAC   Take 2 tablets (150 mg) by mouth 2 times daily

## 2018-09-09 NOTE — PROGRESS NOTES
Data: Patient presented to Birthplace: 2018  3:45 PM.  Reason for maternal/fetal assessment is uterine contractions, decreased fetal movement. Patient reports felt baby move, but significantly less than normal and has been feeling mild contractions all day.  Patient is a .  Prenatal record reviewed. Pregnancy has been uncomplicated..  Gestational Age 37w4d. VSS. Fetal movement present. Patient denies leaking of vaginal fluid/rupture of membranes, vaginal bleeding, abdominal pain, pelvic pressure, nausea, vomiting, headache, visual disturbances, epigastric or URQ pain, significant edema. Support person is present.   Action: Verbal consent for EFM. Triage assessment completed. Bill of rights reviewed.  Response: Patient verbalized agreement with plan. Will contact Dr Johan Reyes with update and further orders.

## 2018-09-09 NOTE — PROGRESS NOTES
Data: Patient assessed in the Birthplace for uterine contractions, decreased fetal movement.  Cervical exam 0/40/-4.  Membranes intact.  Contractions irregular, palpates mild.  Action:  Presumed adequate fetal oxygenation documented (see flow record). Discharge instructions reviewed.  Patient instructed to report change in fetal movement, vaginal leaking of fluid or bleeding, abdominal pain, or any concerns related to the pregnancy to her nurse/physician.    Response: Orders to discharge home per Johan Reyes.  Patient verbalized understanding of education and verbalized agreement with plan. Discharged to home.

## 2018-09-09 NOTE — PROGRESS NOTES
27 yo  at 37w4d presented to L&D for evaluation of decreased fetal movement.    FHR Reactive NST.  Category 1.  Mild contractions noted.  Cervix closed    Discharged to home  Outpatient follow up advised this week    Toy Reyes MD

## 2018-09-09 NOTE — IP AVS SNAPSHOT
[facility] Labor and Delivery    [address]    [facility] [address]    Phone:  [phone]    Fax:  [phone]                                       After Visit Summary   9/9/2018    Ofelia Will    MRN: [id number]           After Visit Summary Signature Page     I have received my discharge instructions, and my questions have been answered. I have discussed any challenges I see with this plan with the nurse or doctor.    ..........................................................................................................................................  Patient/Patient Representative Signature      ..........................................................................................................................................  Patient Representative Print Name and Relationship to Patient    ..................................................               ................................................  Date                                            Time    ..........................................................................................................................................  Reviewed by Signature/Title    ...................................................              ..............................................  Date                                                            Time          22EPIC Rev 08/18

## 2018-09-09 NOTE — DISCHARGE INSTRUCTIONS
Discharge Instruction for Undelivered Patients      You were seen for: Labor Assessment and Fetal Assessment  We Consulted: Dr Reyes  You had (Test or Medicine):Non-stress Test     Diet:   Drink 8 to 12 glasses of liquids (milk, juice, water) every day.  You may eat meals and snacks.     Activity:  Call your doctor or nurse midwife if your baby is moving less than usual.     Call your provider if you notice:  Swelling in your face or increased swelling in your hands or legs.  Headaches that are not relieved by Tylenol (acetaminophen).  Changes in your vision (blurring: seeing spots or stars.)  Nausea (sick to your stomach) and vomiting (throwing up).   Weight gain of 5 pounds or more per week.  Heartburn that doesn't go away.  Signs of bladder infection: pain when you urinate (use the toilet), need to go more often and more urgently.  The bag of joaquin (rupture of membranes) breaks, or you notice leaking in your underwear.  Bright red blood in your underwear.  Abdominal (lower belly) or stomach pain.  For first baby: Contractions (tightening) less than 5 minutes apart for one hour or more.  Increase or change in vaginal discharge (note the color and amount)    Follow-up:  As scheduled in the clinic

## 2018-09-09 NOTE — TELEPHONE ENCOUNTER
"  Reason for Disposition    [1] Pregnant 23 or more weeks AND [2] baby moving less today AND [3] unable or unwilling to perform kick count     \"I feel like I am having Montez Garner all day, they seem stronger today and I am not able to get the proper kick counts. Some were 5/hour but this am and later this after noon. I'm getting none or 3 or less in an hour. I have tried eating, drinking and laying on my left side. I have some mild low back pain\" Mom concerned. Denies bleeding or other sx. Triaged and advised ER.    Additional Information    Negative: Sounds like a life-threatening emergency to the triager    Negative: Injury to abdomen    Negative: [1] Pregnant > 36 weeks AND [2] having contractions or other symptoms of labor    Negative: [1] Pregnant < 37 weeks AND [2] having contractions or other symptoms of labor    Negative: [1] Pregnant > 20 weeks AND [2] abdominal pain    Negative: [1] Pregnant > 20 weeks AND [2] vaginal bleeding or spotting    Negative: Blurred vision or visual change    Negative: [1] SEVERE headache AND [2] not relieved with acetaminophen (e.g., Tylenol)    Negative: Leakage of fluid from vagina    Negative: [1] Pregnant 23 or more weeks AND [2] baby moving less today by kick count  (e.g., kick count < 5 in 1 hour or < 10 in 2 hours)    Protocols used: PREGNANCY - DECREASED FETAL MOVEMENT-ADULT-    "

## 2018-09-12 ENCOUNTER — PRENATAL OFFICE VISIT (OUTPATIENT)
Dept: OBGYN | Facility: CLINIC | Age: 28
End: 2018-09-12
Payer: COMMERCIAL

## 2018-09-12 VITALS — WEIGHT: 181 LBS | BODY MASS INDEX: 33.11 KG/M2 | DIASTOLIC BLOOD PRESSURE: 70 MMHG | SYSTOLIC BLOOD PRESSURE: 136 MMHG

## 2018-09-12 DIAGNOSIS — Z34.03 ENCOUNTER FOR SUPERVISION OF NORMAL FIRST PREGNANCY IN THIRD TRIMESTER: Primary | ICD-10-CM

## 2018-09-12 PROCEDURE — 99207 ZZC PRENATAL VISIT: CPT | Performed by: OBSTETRICS & GYNECOLOGY

## 2018-09-12 NOTE — NURSING NOTE
"Chief Complaint   Patient presents with     Prenatal Care       Initial /70  Wt 181 lb (82.1 kg)  LMP 2017  Breastfeeding? No  BMI 33.11 kg/m2 Estimated body mass index is 33.11 kg/(m^2) as calculated from the following:    Height as of 18: 5' 2\" (1.575 m).    Weight as of this encounter: 181 lb (82.1 kg).  BP completed using cuff size: regular          38w0d  + FM daily  + BH contractions  + cramping  - bleeding  - headache or heartburn  + edema in feet  Caitlin Johnson LPN             "

## 2018-09-12 NOTE — MR AVS SNAPSHOT
After Visit Summary   9/12/2018    Ofelia Will    MRN: [id number]           Patient Information     Date Of Birth          1990        Visit Information        Provider Department      9/12/2018 3:30 PM Kelsi Jane MD [facility]        Today's Diagnoses     Encounter for supervision of normal first pregnancy in third trimester    -  1       Follow-ups after your visit        Your next 10 appointments already scheduled     Sep 19, 2018  3:30 PM CDT   ESTABLISHED PRENATAL with Kelsi Jane MD   [facility] ([facility])    [address] [facility] [address] [phone]            Sep 26, 2018  3:30 PM CDT   ESTABLISHED PRENATAL with Kelsi Jane MD   [facility] ([facility]    [address] [facility] [address] [address] [phone]              Who to contact     If you have questions or need follow up information about today's clinic visit or your schedule please contact [facility] directly at [phone].  Normal or non-critical lab and imaging results will be communicated to you by AA Partyhart, letter or phone within 4 business days after the clinic has received the results. If you do not hear from us within 7 days, please contact the clinic through Buttert or phone. If you have a critical or abnormal lab result, we will notify you by phone as soon as possible.  Submit refill requests through [employer] Information [employer] or call your pharmacy and they will forward the refill request to us. Please allow 3 business days for your refill to be completed.          Additional Information About Your Visit        MyChart Information     [employer] gives you secure access to your electronic health record. If you see a primary care provider, you can also send messages to your care team and make appointments. If you have questions, please call your primary care  clinic.  If you do not have a primary care provider, please call [phone] and they will assist you.        Care EveryWhere ID     This is your Care EveryWhere ID. This could be used by other organizations to access your [place] medical records  [id number]        Your Vitals Were     Last Period Breastfeeding? BMI (Body Mass Index)             12/20/2017 No 33.11 kg/m2          Blood Pressure from Last 3 Encounters:   09/12/18 136/70   09/09/18 128/80   09/05/18 128/80    Weight from Last 3 Encounters:   09/12/18 181 lb (82.1 kg)   09/05/18 178 lb (80.7 kg)   08/29/18 176 lb (79.8 kg)              Today, you had the following     No orders found for display       Primary Care Provider Office Phone # Fax #    Guerda Esquivel [phone] [address]  [facility] [address]        Equal Access to Services     MAHIN STEARNS : Hadii aad ku hadasho Sokg, waaxda luqadaha, qaybta kaalmada adeegyada, viki camara . So [facility] [phone].    ATENCIÓN: Si habla español, tiene a ferris disposición servicios gratuitos de asistencia lingüística. Llame al [phone].    We comply with applicable federal civil rights laws and [place] laws. We do not discriminate on the basis of race, color, national origin, age, disability, sex, sexual orientation, or gender identity.            Thank you!     Thank you for choosing [facility]  for your care. Our goal is always to provide you with excellent care. Hearing back from our patients is one way we can continue to improve our services. Please take a few minutes to complete the written survey that you may receive in the mail after your visit with us. Thank you!             Your Updated Medication List - Protect others around you: Learn how to safely use, store and throw away your medicines at www.disposemymeds.org.          This list is accurate as of 9/12/18 11:59 PM.  Always use your most recent med list.                    Brand Name Dispense Instructions for use Diagnosis    prenatal multivitamin plus iron 27-0.8 MG Tabs per tablet     100 tablet    Take 1 tablet by mouth daily    Supervision of normal first pregnancy, antepartum       ranitidine 75 MG tablet    ZANTAC    30 tablet    Take 2 tablets (150 mg) by mouth 2 times daily    Gastroesophageal reflux disease without esophagitis

## 2018-09-13 NOTE — PROGRESS NOTES
PROBLEM LIST  LABS:Opos/RNI  GIRL    1. Cf carrier: partner declined testing.  2. Hx of atrial septal defect, status post repair. Plan: fetal echo in [facility] (scheduled for 5/18)  3. Rubella nonimmune: MMR after delivery.    Was in L&D for decreased fetal movement, better now. Cervix is changed slightly as it was high and closed on L&D. Signs of symptoms of labor reviewed, including when to call or come in for evaluation. Follow up in 1 week if not delivered. Stripping of membranes next time was discussed today.    Kelsi Jane MD

## 2018-09-19 ENCOUNTER — PRENATAL OFFICE VISIT (OUTPATIENT)
Dept: OBGYN | Facility: CLINIC | Age: 28
End: 2018-09-19
Payer: COMMERCIAL

## 2018-09-19 VITALS — WEIGHT: 183 LBS | SYSTOLIC BLOOD PRESSURE: 120 MMHG | DIASTOLIC BLOOD PRESSURE: 70 MMHG | BODY MASS INDEX: 33.47 KG/M2

## 2018-09-19 DIAGNOSIS — Z34.03 ENCOUNTER FOR SUPERVISION OF NORMAL FIRST PREGNANCY IN THIRD TRIMESTER: Primary | ICD-10-CM

## 2018-09-19 PROCEDURE — 99207 ZZC PRENATAL VISIT: CPT | Performed by: OBSTETRICS & GYNECOLOGY

## 2018-09-19 NOTE — PROGRESS NOTES
PROBLEM LIST  LABS:Opos/RNI  GIRL    1. Cf carrier: partner declined testing.  2. Hx of atrial septal defect, status post repair. Plan: fetal echo in [facility] (scheduled for 5/18)  3. Rubella nonimmune: MMR after delivery.    Few contractions, uncomfortable, but nothing persistent. Good fetal movement. Membranes stripped with her permission. Signs of symptoms of labor reviewed, including when to call or come in for evaluation. Follow up weekly if not delivered.    Kelsi Jane MD

## 2018-09-19 NOTE — MR AVS SNAPSHOT
After Visit Summary   9/19/2018    Ofelia Will    MRN: [id number]           Patient Information     Date Of Birth          1990        Visit Information        Provider Department      9/19/2018 3:30 PM Kelsi Jane MD [facility]        Today's Diagnoses     Encounter for supervision of normal first pregnancy in third trimester    -  1       Follow-ups after your visit        Your next 10 appointments already scheduled     Sep 26, 2018  3:30 PM CDT   ESTABLISHED PRENATAL with Kelsi Jane MD   [facility] ([facility]    [address] [place] [address] [id number] [phone]              Who to contact     If you have questions or need follow up information about today's clinic visit or your schedule please contact [facility] directly at [phone].  Normal or non-critical lab and imaging results will be communicated to you by MyChart, letter or phone within 4 business days after the clinic has received the results. If you do not hear from us within 7 days, please contact the clinic through [employer] or phone. If you have a critical or abnormal lab result, we will notify you by phone as soon as possible.  Submit refill requests through [employer] or call your pharmacy and they will forward the refill request to us. Please allow 3 business days for your refill to be completed.          Additional Information About Your Visit        MyChart Information     [employer] gives you secure access to your electronic health record. If you see a primary care provider, you can also send messages to your care team and make appointments. If you have questions, please call your primary care clinic.  If you do not have a primary care provider, please call [phone] and they will assist you.        Care EveryWhere ID     This is your Care EveryWhere ID. This could be used by other organizations to access your  [place] medical records  [id number]        Your Vitals Were     Last Period Breastfeeding? BMI (Body Mass Index)             12/20/2017 No 33.47 kg/m2          Blood Pressure from Last 3 Encounters:   09/19/18 120/70   09/12/18 136/70   09/09/18 128/80    Weight from Last 3 Encounters:   09/19/18 183 lb (83 kg)   09/12/18 181 lb (82.1 kg)   09/05/18 178 lb (80.7 kg)              Today, you had the following     No orders found for display       Primary Care Provider Office Phone # Fax #    Guerda Esquivel [phone]       [address]  [facility] [place] [facility] [address]        Equal Access to Services     MAHIN STEARNS : Hadpro vasqueso Elin, wagersonda epifanioadaha, qaybta kaalmada sari, viki camara . So [facility] [phone].    ATENCIÓN: Si habla español, tiene a ferris disposición servicios gratuitos de asistencia lingüística. Ll[facility] [phone].    We comply with applicable federal civil rights laws and [place] laws. We do not discriminate on the basis of race, color, national origin, age, disability, sex, sexual orientation, or gender identity.            Thank you!     Thank you for choosing [facility]  for your care. Our goal is always to provide you with excellent care. Hearing back from our patients is one way we can continue to improve our services. Please take a few minutes to complete the written survey that you may receive in the mail after your visit with us. Thank you!             Your Updated Medication List - Protect others around you: Learn how to safely use, store and throw away your medicines at www.disposemymeds.org.          This list is accurate as of 9/19/18  4:25 PM.  Always use your most recent med list.                   Brand Name Dispense Instructions for use Diagnosis    prenatal multivitamin plus iron 27-0.8 MG Tabs per tablet     100 tablet    Take 1 tablet by mouth daily    Supervision of normal first pregnancy, antepartum        ranitidine 75 MG tablet    ZANTAC    30 tablet    Take 2 tablets (150 mg) by mouth 2 times daily    Gastroesophageal reflux disease without esophagitis

## 2018-09-19 NOTE — NURSING NOTE
"Chief Complaint   Patient presents with     Prenatal Care       Initial /70  Wt 183 lb (83 kg)  LMP 2017  Breastfeeding? No  BMI 33.47 kg/m2 Estimated body mass index is 33.47 kg/(m^2) as calculated from the following:    Height as of 18: 5' 2\" (1.575 m).    Weight as of this encounter: 183 lb (83 kg).  BP completed using cuff size: regular        39w0d  + FM daily  + savannah calvin contractions  + cramping  - bleeding or leaking of fluid  + edema in feet/ankles  Caitlin Johnson LPN               "

## 2018-09-20 ENCOUNTER — HOSPITAL ENCOUNTER (INPATIENT)
Facility: CLINIC | Age: 28
LOS: 3 days | Discharge: HOME | End: 2018-09-23
Attending: OBSTETRICS & GYNECOLOGY | Admitting: OBSTETRICS & GYNECOLOGY
Payer: COMMERCIAL

## 2018-09-20 ENCOUNTER — NURSE TRIAGE (OUTPATIENT)
Dept: NURSING | Facility: CLINIC | Age: 28
End: 2018-09-20

## 2018-09-20 LAB
ABO + RH BLD: NORMAL
ABO + RH BLD: NORMAL
SPECIMEN EXP DATE BLD: NORMAL

## 2018-09-20 PROCEDURE — 25000128 H RX IP 250 OP 636: Performed by: OBSTETRICS & GYNECOLOGY

## 2018-09-20 PROCEDURE — 12000029 ZZH R&B OB INTERMEDIATE

## 2018-09-20 PROCEDURE — 86900 BLOOD TYPING SEROLOGIC ABO: CPT | Performed by: OBSTETRICS & GYNECOLOGY

## 2018-09-20 PROCEDURE — 86780 TREPONEMA PALLIDUM: CPT | Performed by: OBSTETRICS & GYNECOLOGY

## 2018-09-20 PROCEDURE — 85018 HEMOGLOBIN: CPT | Performed by: OBSTETRICS & GYNECOLOGY

## 2018-09-20 PROCEDURE — 86901 BLOOD TYPING SEROLOGIC RH(D): CPT | Performed by: OBSTETRICS & GYNECOLOGY

## 2018-09-20 RX ORDER — METHYLERGONOVINE MALEATE 0.2 MG/ML
200 INJECTION INTRAVENOUS
Status: DISCONTINUED | OUTPATIENT
Start: 2018-09-20 | End: 2018-09-21

## 2018-09-20 RX ORDER — ONDANSETRON 2 MG/ML
4 INJECTION INTRAMUSCULAR; INTRAVENOUS EVERY 6 HOURS PRN
Status: DISCONTINUED | OUTPATIENT
Start: 2018-09-20 | End: 2018-09-21

## 2018-09-20 RX ORDER — IBUPROFEN 800 MG/1
800 TABLET, FILM COATED ORAL
Status: DISCONTINUED | OUTPATIENT
Start: 2018-09-20 | End: 2018-09-21 | Stop reason: ALTCHOICE

## 2018-09-20 RX ORDER — NALOXONE HYDROCHLORIDE 0.4 MG/ML
.1-.4 INJECTION, SOLUTION INTRAMUSCULAR; INTRAVENOUS; SUBCUTANEOUS
Status: DISCONTINUED | OUTPATIENT
Start: 2018-09-20 | End: 2018-09-21

## 2018-09-20 RX ORDER — PENICILLIN G POTASSIUM 5000000 [IU]/1
5 INJECTION, POWDER, FOR SOLUTION INTRAMUSCULAR; INTRAVENOUS ONCE
Status: COMPLETED | OUTPATIENT
Start: 2018-09-20 | End: 2018-09-20

## 2018-09-20 RX ORDER — OXYCODONE AND ACETAMINOPHEN 5; 325 MG/1; MG/1
1 TABLET ORAL
Status: DISCONTINUED | OUTPATIENT
Start: 2018-09-20 | End: 2018-09-21 | Stop reason: ALTCHOICE

## 2018-09-20 RX ORDER — OXYTOCIN 10 [USP'U]/ML
10 INJECTION, SOLUTION INTRAMUSCULAR; INTRAVENOUS
Status: DISCONTINUED | OUTPATIENT
Start: 2018-09-20 | End: 2018-09-21

## 2018-09-20 RX ORDER — ACETAMINOPHEN 325 MG/1
650 TABLET ORAL EVERY 4 HOURS PRN
Status: DISCONTINUED | OUTPATIENT
Start: 2018-09-20 | End: 2018-09-21

## 2018-09-20 RX ORDER — OXYTOCIN/0.9 % SODIUM CHLORIDE 30/500 ML
100-340 PLASTIC BAG, INJECTION (ML) INTRAVENOUS CONTINUOUS PRN
Status: COMPLETED | OUTPATIENT
Start: 2018-09-20 | End: 2018-09-21

## 2018-09-20 RX ORDER — FENTANYL CITRATE 50 UG/ML
50-100 INJECTION, SOLUTION INTRAMUSCULAR; INTRAVENOUS
Status: DISCONTINUED | OUTPATIENT
Start: 2018-09-20 | End: 2018-09-21

## 2018-09-20 RX ORDER — SODIUM CHLORIDE, SODIUM LACTATE, POTASSIUM CHLORIDE, CALCIUM CHLORIDE 600; 310; 30; 20 MG/100ML; MG/100ML; MG/100ML; MG/100ML
INJECTION, SOLUTION INTRAVENOUS CONTINUOUS
Status: DISCONTINUED | OUTPATIENT
Start: 2018-09-20 | End: 2018-09-21

## 2018-09-20 RX ORDER — CARBOPROST TROMETHAMINE 250 UG/ML
250 INJECTION, SOLUTION INTRAMUSCULAR
Status: DISCONTINUED | OUTPATIENT
Start: 2018-09-20 | End: 2018-09-21

## 2018-09-20 RX ADMIN — SODIUM CHLORIDE, POTASSIUM CHLORIDE, SODIUM LACTATE AND CALCIUM CHLORIDE: 600; 310; 30; 20 INJECTION, SOLUTION INTRAVENOUS at 21:27

## 2018-09-20 RX ADMIN — PENICILLIN G POTASSIUM 5 MILLION UNITS: 5000000 POWDER, FOR SOLUTION INTRAMUSCULAR; INTRAPLEURAL; INTRATHECAL; INTRAVENOUS at 21:27

## 2018-09-20 NOTE — IP AVS SNAPSHOT
MRN:[id number]                      After Visit Summary   9/20/2018    Ofelia Will    MRN: [id number]           Thank you!     Thank you for choosing [facility] for your care. Our goal is always to provide you with excellent care. Hearing back from our patients is one way we can continue to improve our services. Please take a few minutes to complete the written survey that you may receive in the mail after you visit. If you would like to speak to someone directly about your visit please contact Patient Relations at [phone]. Thank you!          Patient Information     Date Of Birth          1990        About your hospital stay     You were admitted on:  September 20, 2018 You last received care in the:  [facility] Postpartum    You were discharged on:  September 23, 2018       Who to Call     For medical emergencies, please call 911.  For non-urgent questions about your medical care, please call your primary care provider or clinic, [phone]          Attending Provider     Provider Specialty    Kelsi Jane MD OB/Gyn    [facility]       Primary Care Provider Office Phone # Fax #    Guerda Esquivel [phone]      Your next 10 appointments already scheduled     Sep 26, 2018  3:30 PM CDT   ESTABLISHED PRENATAL with Kelsi Jane MD   [facility]    [address] [place] [id number] [phone]              Further instructions from your care team       Postpartum Vaginal Delivery Instructions    Activity       Ask family and friends for help when you need it.    Do not place anything in your vagina for 6 weeks.    You are not restricted on other activities, but take it easy for a few weeks to allow your body to recover from delivery.  You are able to do any activities you feel up to that point.    No driving until you have stopped taking your  pain medications (usually two weeks after delivery).     Call your health care provider if you have any of these symptoms:       Increased pain, swelling, redness, or fluid around your stiches from an episiotomy or perineal tear.    A fever above 100.4 F (38 C) with or without chills when placing a thermometer under your tongue.    You soak a sanitary pad with blood within 1 hour, or you see blood clots larger than a golf ball.    Bleeding that lasts more than 6 weeks.    Vaginal discharge that smells bad.    Severe pain, cramping or tenderness in your lower belly area.    A need to urinate more frequently (use the toilet more often), more urgently (use the toilet very quickly), or it burns when you urinate.    Nausea and vomiting.    Redness, swelling or pain around a vein in your leg.    Problems breastfeeding or a red or painful area on your breast.    Chest pain and cough or are gasping for air.    Problems coping with sadness, anxiety, or depression.  If you have any concerns about hurting yourself or the baby, call your provider immediately.     You have questions or concerns after you return home.     Keep your hands clean:  Always wash your hands before touching your perineal area and stitches.  This helps reduce your risk of infection.  If your hands aren't dirty, you may use an alcohol hand-rub to clean your hands. Keep your nails clean and short.        Pending Results     No orders found from 9/18/2018 to 9/21/2018.            Statement of Approval     Ordered          09/23/18 1013  I have reviewed and agree with all the recommendations and orders detailed in this document.  EFFECTIVE NOW     Approved and electronically signed by:  Tristan Ivory MD             Admission Information     Date & Time Provider Department Dept. Phone    9/20/2018 Ruddy Paredes MD [facility] Postpartum [phone]      Your Vitals Were     Blood Pressure Pulse Temperature Respirations Height Weight    122/73  "77 98.7  F (37.1  C) (Oral) 18 1.588 m (5' 2.5\") 83 kg (183 lb)    Last Period BMI (Body Mass Index)                12/20/2017 32.94 kg/m2          Tugg Information     Tugg gives you secure access to your electronic health record. If you see a primary care provider, you can also send messages to your care team and make appointments. If you have questions, please call your primary care clinic.  If you do not have a primary care provider, please call [phone] and they will assist you.        Care EveryWhere ID     This is your Care EveryWhere ID. This could be used by other organizations to access your [facility] [place] medical records  [id number]        Equal Access to Services     MAHIN STEARNS : Frida Worthington, martínez stokes, negro guo, viki gonzalez. So [facility] [phone].    ATENCIÓN: Si habla español, tiene a ferris disposición servicios gratuitos de asistencia lingüística. Llame al [phone].    We comply with applicable federal civil rights laws and [place] laws. We do not discriminate on the basis of race, color, national origin, age, disability, sex, sexual orientation, or gender identity.               Review of your medicines      UNREVIEWED medicines. Ask your doctor about these medicines        Dose / Directions    prenatal multivitamin plus iron 27-0.8 MG Tabs per tablet   Used for:  Supervision of normal first pregnancy, antepartum        Dose:  1 tablet   Take 1 tablet by mouth daily   Quantity:  100 tablet   Refills:  3       ranitidine 75 MG tablet   Commonly known as:  ZANTAC   Used for:  Gastroesophageal reflux disease without esophagitis        Dose:  150 mg   Take 2 tablets (150 mg) by mouth 2 times daily   Quantity:  30 tablet   Refills:  3                Protect others around you: Learn how to safely use, store and throw away your medicines at www.disposemymeds.org.             Medication List: This is a list of all your " medications and when to take them. Check marks below indicate your daily home schedule. Keep this list as a reference.      Medications           Morning Afternoon Evening Bedtime As Needed    prenatal multivitamin plus iron 27-0.8 MG Tabs per tablet   Take 1 tablet by mouth daily   Last time this was given:  1 tablet on 9/23/2018  8:08 AM                                ranitidine 75 MG tablet   Commonly known as:  ZANTAC   Take 2 tablets (150 mg) by mouth 2 times daily

## 2018-09-20 NOTE — IP AVS SNAPSHOT
[facility]    [address]    [facility] [address]    Phone:  [phone]    Fax:  [phone]                                       After Visit Summary   9/20/2018    Ofelia Will    MRN: [id number]           After Visit Summary Signature Page     I have received my discharge instructions, and my questions have been answered. I have discussed any challenges I see with this plan with the nurse or doctor.    ..........................................................................................................................................  Patient/Patient Representative Signature      ..........................................................................................................................................  Patient Representative Print Name and Relationship to Patient    ..................................................               ................................................  Date                                   Time    ..........................................................................................................................................  Reviewed by Signature/Title    ...................................................              ..............................................  Date                                               Time          22EPIC Rev 08/18

## 2018-09-21 ENCOUNTER — ANESTHESIA (OUTPATIENT)
Dept: OBGYN | Facility: CLINIC | Age: 28
End: 2018-09-21
Payer: COMMERCIAL

## 2018-09-21 ENCOUNTER — ANESTHESIA EVENT (OUTPATIENT)
Dept: OBGYN | Facility: CLINIC | Age: 28
End: 2018-09-21
Payer: COMMERCIAL

## 2018-09-21 LAB
HGB BLD-MCNC: 13.3 G/DL (ref 11.7–15.7)
T PALLIDUM AB SER QL: NONREACTIVE

## 2018-09-21 PROCEDURE — 25000125 ZZHC RX 250: Performed by: OBSTETRICS & GYNECOLOGY

## 2018-09-21 PROCEDURE — 25000128 H RX IP 250 OP 636: Performed by: OBSTETRICS & GYNECOLOGY

## 2018-09-21 PROCEDURE — 40000671 ZZH STATISTIC ANESTHESIA CASE

## 2018-09-21 PROCEDURE — 72200001 ZZH LABOR CARE VAGINAL DELIVERY SINGLE

## 2018-09-21 PROCEDURE — 25000132 ZZH RX MED GY IP 250 OP 250 PS 637: Performed by: OBSTETRICS & GYNECOLOGY

## 2018-09-21 PROCEDURE — 00HU33Z INSERTION OF INFUSION DEVICE INTO SPINAL CANAL, PERCUTANEOUS APPROACH: ICD-10-PCS | Performed by: ANESTHESIOLOGY

## 2018-09-21 PROCEDURE — 12000029 ZZH R&B OB INTERMEDIATE

## 2018-09-21 PROCEDURE — 3E0R3BZ INTRODUCTION OF ANESTHETIC AGENT INTO SPINAL CANAL, PERCUTANEOUS APPROACH: ICD-10-PCS | Performed by: ANESTHESIOLOGY

## 2018-09-21 PROCEDURE — 37000011 ZZH ANESTHESIA WARD SERVICE

## 2018-09-21 PROCEDURE — 25000128 H RX IP 250 OP 636: Performed by: ANESTHESIOLOGY

## 2018-09-21 PROCEDURE — 59400 OBSTETRICAL CARE: CPT | Performed by: OBSTETRICS & GYNECOLOGY

## 2018-09-21 RX ORDER — IBUPROFEN 600 MG/1
600 TABLET, FILM COATED ORAL EVERY 6 HOURS PRN
Status: DISCONTINUED | OUTPATIENT
Start: 2018-09-21 | End: 2018-09-23 | Stop reason: HOSPADM

## 2018-09-21 RX ORDER — BUPIVACAINE HCL/0.9 % NACL/PF 0.125 %
PLASTIC BAG, INJECTION (ML) EPIDURAL CONTINUOUS
Status: DISCONTINUED | OUTPATIENT
Start: 2018-09-21 | End: 2018-09-21

## 2018-09-21 RX ORDER — LIDOCAINE 40 MG/G
CREAM TOPICAL
Status: DISCONTINUED | OUTPATIENT
Start: 2018-09-21 | End: 2018-09-21

## 2018-09-21 RX ORDER — AMOXICILLIN 250 MG
1 CAPSULE ORAL 2 TIMES DAILY
Status: DISCONTINUED | OUTPATIENT
Start: 2018-09-21 | End: 2018-09-23 | Stop reason: HOSPADM

## 2018-09-21 RX ORDER — BISACODYL 10 MG
10 SUPPOSITORY, RECTAL RECTAL DAILY PRN
Status: DISCONTINUED | OUTPATIENT
Start: 2018-09-23 | End: 2018-09-23 | Stop reason: HOSPADM

## 2018-09-21 RX ORDER — OXYTOCIN/0.9 % SODIUM CHLORIDE 30/500 ML
340 PLASTIC BAG, INJECTION (ML) INTRAVENOUS CONTINUOUS PRN
Status: DISCONTINUED | OUTPATIENT
Start: 2018-09-21 | End: 2018-09-23 | Stop reason: HOSPADM

## 2018-09-21 RX ORDER — OXYCODONE HYDROCHLORIDE 5 MG/1
5 TABLET ORAL EVERY 4 HOURS PRN
Status: DISCONTINUED | OUTPATIENT
Start: 2018-09-21 | End: 2018-09-23 | Stop reason: HOSPADM

## 2018-09-21 RX ORDER — HYDROMORPHONE HYDROCHLORIDE 1 MG/ML
0.3 INJECTION, SOLUTION INTRAMUSCULAR; INTRAVENOUS; SUBCUTANEOUS
Status: DISCONTINUED | OUTPATIENT
Start: 2018-09-21 | End: 2018-09-23 | Stop reason: HOSPADM

## 2018-09-21 RX ORDER — AMOXICILLIN 250 MG
2 CAPSULE ORAL 2 TIMES DAILY
Status: DISCONTINUED | OUTPATIENT
Start: 2018-09-21 | End: 2018-09-23 | Stop reason: HOSPADM

## 2018-09-21 RX ORDER — OXYTOCIN/0.9 % SODIUM CHLORIDE 30/500 ML
100 PLASTIC BAG, INJECTION (ML) INTRAVENOUS CONTINUOUS
Status: DISCONTINUED | OUTPATIENT
Start: 2018-09-21 | End: 2018-09-23 | Stop reason: HOSPADM

## 2018-09-21 RX ORDER — OXYTOCIN 10 [USP'U]/ML
10 INJECTION, SOLUTION INTRAMUSCULAR; INTRAVENOUS
Status: DISCONTINUED | OUTPATIENT
Start: 2018-09-21 | End: 2018-09-23 | Stop reason: HOSPADM

## 2018-09-21 RX ORDER — LANOLIN 100 %
OINTMENT (GRAM) TOPICAL
Status: DISCONTINUED | OUTPATIENT
Start: 2018-09-21 | End: 2018-09-23 | Stop reason: HOSPADM

## 2018-09-21 RX ORDER — ACETAMINOPHEN 325 MG/1
650 TABLET ORAL EVERY 4 HOURS PRN
Status: DISCONTINUED | OUTPATIENT
Start: 2018-09-21 | End: 2018-09-23 | Stop reason: HOSPADM

## 2018-09-21 RX ORDER — EPHEDRINE SULFATE 50 MG/ML
5 INJECTION, SOLUTION INTRAMUSCULAR; INTRAVENOUS; SUBCUTANEOUS
Status: DISCONTINUED | OUTPATIENT
Start: 2018-09-21 | End: 2018-09-21

## 2018-09-21 RX ORDER — NALBUPHINE HYDROCHLORIDE 10 MG/ML
2.5-5 INJECTION, SOLUTION INTRAMUSCULAR; INTRAVENOUS; SUBCUTANEOUS EVERY 6 HOURS PRN
Status: DISCONTINUED | OUTPATIENT
Start: 2018-09-21 | End: 2018-09-21

## 2018-09-21 RX ORDER — NALOXONE HYDROCHLORIDE 0.4 MG/ML
.1-.4 INJECTION, SOLUTION INTRAMUSCULAR; INTRAVENOUS; SUBCUTANEOUS
Status: DISCONTINUED | OUTPATIENT
Start: 2018-09-21 | End: 2018-09-21

## 2018-09-21 RX ORDER — PRENATAL VIT/IRON FUM/FOLIC AC 27MG-0.8MG
1 TABLET ORAL DAILY
Status: DISCONTINUED | OUTPATIENT
Start: 2018-09-21 | End: 2018-09-23 | Stop reason: HOSPADM

## 2018-09-21 RX ORDER — HYDROCORTISONE 2.5 %
CREAM (GRAM) TOPICAL 3 TIMES DAILY PRN
Status: DISCONTINUED | OUTPATIENT
Start: 2018-09-21 | End: 2018-09-23 | Stop reason: HOSPADM

## 2018-09-21 RX ORDER — OXYTOCIN/0.9 % SODIUM CHLORIDE 30/500 ML
1-24 PLASTIC BAG, INJECTION (ML) INTRAVENOUS CONTINUOUS
Status: DISCONTINUED | OUTPATIENT
Start: 2018-09-21 | End: 2018-09-21

## 2018-09-21 RX ORDER — NALOXONE HYDROCHLORIDE 0.4 MG/ML
.1-.4 INJECTION, SOLUTION INTRAMUSCULAR; INTRAVENOUS; SUBCUTANEOUS
Status: DISCONTINUED | OUTPATIENT
Start: 2018-09-21 | End: 2018-09-23 | Stop reason: HOSPADM

## 2018-09-21 RX ADMIN — SODIUM CHLORIDE 2.5 MILLION UNITS: 9 INJECTION, SOLUTION INTRAVENOUS at 06:02

## 2018-09-21 RX ADMIN — SODIUM CHLORIDE, POTASSIUM CHLORIDE, SODIUM LACTATE AND CALCIUM CHLORIDE: 600; 310; 30; 20 INJECTION, SOLUTION INTRAVENOUS at 04:41

## 2018-09-21 RX ADMIN — SODIUM CHLORIDE 2.5 MILLION UNITS: 9 INJECTION, SOLUTION INTRAVENOUS at 01:25

## 2018-09-21 RX ADMIN — SODIUM CHLORIDE, POTASSIUM CHLORIDE, SODIUM LACTATE AND CALCIUM CHLORIDE 500 ML: 600; 310; 30; 20 INJECTION, SOLUTION INTRAVENOUS at 04:40

## 2018-09-21 RX ADMIN — Medication 15 ML: at 02:51

## 2018-09-21 RX ADMIN — OXYTOCIN-SODIUM CHLORIDE 0.9% IV SOLN 30 UNIT/500ML 340 ML/HR: 30-0.9/5 SOLUTION at 12:01

## 2018-09-21 RX ADMIN — IBUPROFEN 600 MG: 600 TABLET ORAL at 18:28

## 2018-09-21 RX ADMIN — SENNOSIDES AND DOCUSATE SODIUM 1 TABLET: 8.6; 5 TABLET ORAL at 20:05

## 2018-09-21 RX ADMIN — PRENATAL VIT W/ FE FUMARATE-FA TAB 27-0.8 MG 1 TABLET: 27-0.8 TAB at 20:06

## 2018-09-21 RX ADMIN — IBUPROFEN 600 MG: 600 TABLET ORAL at 12:51

## 2018-09-21 RX ADMIN — SODIUM CHLORIDE, POTASSIUM CHLORIDE, SODIUM LACTATE AND CALCIUM CHLORIDE: 600; 310; 30; 20 INJECTION, SOLUTION INTRAVENOUS at 09:21

## 2018-09-21 RX ADMIN — Medication: at 03:03

## 2018-09-21 RX ADMIN — FENTANYL CITRATE 100 MCG: 50 INJECTION INTRAMUSCULAR; INTRAVENOUS at 02:15

## 2018-09-21 RX ADMIN — SODIUM CHLORIDE 2.5 MILLION UNITS: 9 INJECTION, SOLUTION INTRAVENOUS at 10:26

## 2018-09-21 NOTE — PROVIDER NOTIFICATION
09/21/18 0610   Provider Notification   Provider Name/Title Dr. Jane   Method of Notification Electronic Page   MD sent a web based page that patient is 9/100/0. Notified MD IV pitocin not started. 2 variables noted, moderate variability.

## 2018-09-21 NOTE — PLAN OF CARE
Data: [facility] presented to [facility]: 2018  7:52 PM.  Reason for maternal/fetal assessment is uterine contractions. Patient reports that her contractions have become more regular since around 4pm and getting stronger.  Patient is a .  Prenatal record reviewed. Pregnancy has been uncomplicated..  Gestational Age 39w1d. VSS. Fetal movement present. Patient denies leaking of vaginal fluid/rupture of membranes, abdominal pain, pelvic pressure, nausea, vomiting, headache, visual disturbances, epigastric or URQ pain, significant edema. Support person is present.   Action: Verbal consent for EFM. Triage assessment completed. Bill of rights reviewed.  Response: Patient verbalized agreement with plan. Will contact Dr Kelsi Jane with update and further orders.

## 2018-09-21 NOTE — TELEPHONE ENCOUNTER
"Ofelia 39 weeks pregnant with first child, healthy pregnancy.  Did have membranes stripped in clinic yesterday (18).  Today having contractions different than previously experienced.  Today they are increasingly painful and have been approx 5 minutes apart since approx 16:00.  Advised to proceed to L & D (at [facility]) and report given per this nurse to charge nurse at [facility] regarding her impending arrival.     Reason for Disposition    [1] First baby (primipara) AND [2] contractions < 6 minutes apart  AND [3] present 2 hours    Additional Information    Negative: Passed out (i.e., lost consciousness, collapsed and was not responding)    Negative: Shock suspected (e.g., cold/pale/clammy skin, too weak to stand, low BP, rapid pulse)    Negative: Difficult to awaken or acting confused  (e.g., disoriented, slurred speech)    Negative: [1] SEVERE abdominal pain (e.g., excruciating) AND [2] constant AND [3] present > 1 hour    Negative: Severe bleeding (e.g., continuous red blood from vagina, or large blood clots)    Negative: Umbilical cord hanging out of the vagina (shiny, white, curled appearance, \"like telephone cord\")    Negative: Uncontrollable urge to push (i.e., feels like baby is coming out now)    Negative: Can see baby    Negative: Sounds like a life-threatening emergency to the triager    Negative: Pregnant < 37 weeks (i.e., )    Negative: [1] Uncertain delivery date AND [2] possibly pregnant < 37 weeks (i.e., )    Protocols used: PREGNANCY - LABOR-ADULT-    "

## 2018-09-21 NOTE — PLAN OF CARE
Data: Ofelia Will transferred to [facility] via cart at 1430. Baby transferred via parent's arms.  Action: Receiving unit notified of transfer: Yes. Patient and family notified of room change. Report given to Sarah KEMP . Belongings sent to receiving unit. Accompanied by Registered Nurse. Oriented patient to surroundings. Call light within reach. ID bands double-checked with receiving RN.  Response: Patient tolerated transfer and is stable.

## 2018-09-21 NOTE — PROVIDER NOTIFICATION
09/21/18 0500   Provider Notification   Provider Name/Title Dr. Jane   Method of Notification In Department   MD in department, showed MD FHT strip, contraction pattern at that time, FHTs of prolonged decel and minimal variability.   Received orders to check SVE at 0600, if minimal change start IV Pitocin. Will relay to patient.

## 2018-09-21 NOTE — PROVIDER NOTIFICATION
09/20/18 2050   Provider Notification   Provider Name/Title Dr. Jane   Method of Notification At Bedside   Request Evaluate in Person   Notification Reason Labor Status    at bedside to discuss plan of care with pt and .  Pt will be staying and would like her PCN started for GBS positive. Will continue to monitor.

## 2018-09-21 NOTE — PROVIDER NOTIFICATION
09/21/18 0725   Provider Notification   Provider Name/Title summer   Method of Notification In Department   Notification Reason Status Update

## 2018-09-21 NOTE — ANESTHESIA PROCEDURE NOTES
Peripheral nerve/Neuraxial procedure note : epidural catheter  Pre-Procedure  Performed by HILL PARKS  Referred by LAKEISHA  Location: OB      Pre-Anesthestic Checklist: patient identified, IV checked, risks and benefits discussed, informed consent, monitors and equipment checked, pre-op evaluation and at physician/surgeon's request    Timeout  Correct Patient: Yes   Correct Procedure: Yes   Correct Site: Yes   Correct Laterality: N/A   Correct Position: Yes   Site Marked: N/A   .   Procedure Documentation    .    Procedure:    Epidural catheter.     .  .       Assessment/Narrative  .  .  . Comments:  Pre-Procedure  Performed by Hill Parks MD  Location: OB.      PreAnesthestic Checklist: patient identified, IV checked, risks and benefits discussed, informed consent obtained, monitors and equipment checked, pre-op evaluation and at physician/surgeon's request.    Timeout   Correct Patient: Yes  Correct Procedure: Epidural catheter placement  Correct Site: Yes   Correct Position: Yes    Procedure Documentation  Procedure:   Epidural catheter block for Labor    Patient currently in labor and she and OBMD request a labor epidural to control her labor pains. Patient was interviewed and examined. Procedure and risks including but not limited to bleeding, infection, nerve injury, paralysis, PDPH, and inadequate block requiring intervention discussed with patient. Questions answered. This epidural is to be placed in anticipation of vaginal delivery.  She consents to the epidural procedure.  Time-out was performed.  I or my partners remain immediately available for management of any issues or complications and will monitor at appropriate intervals.  Procedure: Patient sitting. Betadine prep x 3. Sterile drape applied.  Mask and gloves used.  Lidocaine 1%  local infiltration at L 3-4.  17 G. Tuohy needle at L3-4 by loss of resistance into epidural space.  No CSF, paresthesia or blood. 1.5 % Lidocaine with 1:200,000  Epinephrine 5cc test dose. Epidural catheter inserted w/o resistance to 5 cm in epidural space. Then 0.125% bupivicaine 15 cc with NS 5 cc.  Aspiration negative for blood and CSF.   Negative for neuro change, paresthesia or symptoms of intravascular injection or intrathecal injection.  Infusion orders written and infusion of 0.125% bupivicaine 15cc per hour started.    Hill Parks MD

## 2018-09-21 NOTE — L&D DELIVERY NOTE
Delivery Date:  2018      DATE OF DELIVERY:  2018      PRE-DELIVERY DIAGNOSES:     1.  Intrauterine pregnancy at 39 and 2/7 weeks.    2.  Labor.      POST-DELIVERY DIAGNOSES:   1.  Intrauterine pregnancy at 39 and 2/7 weeks.    2.  Labor.      PROCEDURE PERFORMED:  Spontaneous vaginal delivery with repair of perineal laceration.      DELIVERING OBSTETRICIAN:  Ruddy Paredes MD       QUANTITATIVE BLOOD LOSS:  100 mL.      ANESTHESIA:  Epidural.      NARRATIVE SUMMARY:  The patient is a 28-year-old  1, para 0 patient who presents at 39-2/7 weeks in labor.  She progresses following a normal Membreno curve to complete.  Secondary to known group B beta strep positive, she is started on penicillin per beta strep protocol.  She has an epidural and nitrous oxide for analgesia and delivers over an intact perineum, a living female weighing 6 pounds 13 ounces, Apgars of 8 and 9.  With delivery of the fetal head, the nasal and oropharynx was suctioned with bulb suction.  There was no nuchal cord or shoulder dystocia.  Secondary to the observed fetal tachycardia, potential need for a vacuum-assisted vaginal delivery and meconium, the NICU team were in attendance; however, the baby delivered.  With delivery, the baby breathed spontaneously and there was no resuscitation performed.  After a 1 minute delay, the cord was clamped, cut and then handed to the nurse in attendance.  A living female, 6 pounds 13 ounces, Apgars of 8 and 9, is delivered.  Placenta delivered spontaneously with a 3-vessel cord.  There were no cervical lacerations.  There was a Y-type tear at the distal end of the vagina and a 1 cm tear in the perineum.  These were repaired in standard fashion with 2-0 chromic suture.  The sponge and needle counts were correct.  The estimated blood loss by QBL is 100 mL.  Mother and  went to normal recovery and  nursery respectively.         RUDDY PAREDES MD             D: 2018    T: 2018   MT: CAROLINE      Name:     LATRELL LAM   MRN:      [id number]        Account:        [id number]   :      1990        Delivery Date:  2018               Document: [id number]

## 2018-09-21 NOTE — PROVIDER NOTIFICATION
09/21/18 0345   Provider Notification   Provider Name/Title Dr. Jane   Method of Notification In Department   Notified MD of contraction pattern, FHT's, SROM at 2339 on 9/20/18 clear fluid, large amount. Notified MD that patient also received an epidural for pain control and is control. Received orders from MD to start IV Pitocin if SVE unchanged or ineffective labor pattern. Will relay back to patient.

## 2018-09-21 NOTE — PROVIDER NOTIFICATION
09/21/18 0645   Provider Notification   Provider Name/Title Dr. Jane   Method of Notification Phone   Request Evaluate - Remote   Notification Reason Decels;Labor Status;Uterine Activity;Status Update;SVE   MD notified that at 0639 patient's SVE 10/100/0, late decel noted down to the 90s for 3 min. Turning right to left lateral side. IV fluid flush started , O2 open air mask placed. Notified MD that FHT's now tachy from 160 to 180. MD stated she will update Dr. Paredes.

## 2018-09-21 NOTE — H&P
[facility]    History and Physical  Obstetrics and Gynecology     Date of Admission:  2018    Assessment & Plan   Ofelia Will is a 28 year old female who presents with labor, early active.    ASSESSMENT:   IUP @ 39w1d in early labor.  NST reactive.  Category  I    PLAN:   Admit - see IP orders  Prophylactic antibiotic for + GBS status  Plans to walk a while after antibiotics in, will recheck as needed. Anticipate vaginal delivery.    Kelsi Jane MD    History of Present Illness   Ofelia Will is a 28 year old female  39w1d  Estimated Date of Delivery: 18 is calculated from Patient's last menstrual period was 2017. [facility].    PRENATAL COURSE  Prenatal course was essentially uncomplicated      Recent Labs   Lab Test  18   0949   ABO  O   RH  Pos   AS  Neg     Rhogam not indicated   Recent Labs   Lab Test  18   1520  18   0949   HEPBANG   --   Nonreactive   HIAGAB   --   Nonreactive   GBS  Positive*   --    RUQIGG   --   8       Past Medical History    I have reviewed this patient's medical history and updated it with pertinent information if needed.   Past Medical History:   Diagnosis Date     ASD (atrial septal defect)     s/p surgical closer age 11 to 13 in [place], [facility] several follow ups , last one  neg no further follow up      History of anemia     treatred with iron      History of depression 2014    resolved     History of vitamin D deficiency      Initiation of Depo Provera 2014     Left otitis media 2014    treated iwth augmentin     Mental disorder     anxiety       Past Surgical History   I have reviewed this patient's surgical history and updated it with pertinent information if needed.  Past Surgical History:   Procedure Laterality Date     REPAIR ATRIAL SEPTAL DEFECT      at age 11 to 13      TONSILLECTOMY         Prior to Admission Medications   Prior to  Admission Medications   Prescriptions Last Dose Informant Patient Reported? Taking?   Prenatal Vit-Fe Fumarate-FA (PRENATAL MULTIVITAMIN PLUS IRON) 27-0.8 MG TABS per tablet 9/20/2018 at Unknown time  Yes Yes   Sig: Take 1 tablet by mouth daily   ranitidine (ZANTAC) 75 MG tablet Past Week at Unknown time  No Yes   Sig: Take 2 tablets (150 mg) by mouth 2 times daily      Facility-Administered Medications: None     Allergies   No Known Allergies    Social History   I have reviewed this patient's social history and updated it with pertinent information if needed. Ofelia Will  reports that she has never smoked. She has never used smokeless tobacco. She reports that she does not drink alcohol or use illicit drugs.    Family History   I have reviewed this patient's family history and updated it with pertinent information if needed.   Family History   Problem Relation Age of Onset     Breast Cancer Mother      Chronic Obstructive Pulmonary Disease Mother      Hypertension Mother      Depression Mother      Cerebrovascular Disease Father      loss of vision one eye , age 54     Hyperlipidemia Father      Myocardial Infarction Maternal Grandfather      age 50     Myocardial Infarction Paternal Grandfather        Immunization History   Immunizations are up to date    Physical Exam   Temp: 98.6  F (37  C) Temp src: Oral BP: 129/86 Pulse: 90   Resp: 18        Vital Signs with Ranges  Temp:  [98.6  F (37  C)] 98.6  F (37  C)  Pulse:  [90] 90  Resp:  [18] 18  BP: (129)/(86) 129/86    Abdomen: gravid, single vertex fetus, non-tender, EFW 8 lbs 6  Cervical Exam: 3.5 per her admitting RN  Fetal Heart Tones: normal baseline, moderate variablility, + accels, no decels and Category I  TOCO:   frequency q 2-4 minutes    Constitutional: healthy, alert and active   Respiratory: no increased work of breathing  Cardiovascular: no edema  Skin/Extremites: no bruising or bleeding and normal skin color, texture, turgor  Neurologic:  Awake, alert, oriented to name, place and time.  Cranial nerves II-XII are grossly intact.  Motor is 5 out of 5 bilaterally.  Cerebellar finger to nose, heel to shin intact.  Sensory is intact.  Babinski down going, Romberg negative, and gait is normal.  Neuropsychiatric: General: normal, calm and normal eye contact

## 2018-09-21 NOTE — PROVIDER NOTIFICATION
09/21/18 0235   Provider Notification   Provider Name/Title Dr. Parks   Method of Notification At Bedside   MDA at bedside for epidural placement. Consent signed, IV fluid bolus running, patient is in agreement. Patient is sitting at edge of bed.

## 2018-09-22 PROCEDURE — 25000132 ZZH RX MED GY IP 250 OP 250 PS 637: Performed by: OBSTETRICS & GYNECOLOGY

## 2018-09-22 PROCEDURE — 40000085 ZZH STATISTIC IP LACTATION SERVICES 31-45 MIN

## 2018-09-22 PROCEDURE — 40000084 ZZH STATISTIC IP LACTATION SERVICES 16-30 MIN

## 2018-09-22 PROCEDURE — 12000027 ZZH R&B OB

## 2018-09-22 RX ADMIN — IBUPROFEN 600 MG: 600 TABLET ORAL at 00:54

## 2018-09-22 RX ADMIN — SENNOSIDES AND DOCUSATE SODIUM 2 TABLET: 8.6; 5 TABLET ORAL at 20:41

## 2018-09-22 RX ADMIN — PRENATAL VIT W/ FE FUMARATE-FA TAB 27-0.8 MG 1 TABLET: 27-0.8 TAB at 08:19

## 2018-09-22 RX ADMIN — HYDROCORTISONE: 25 CREAM TOPICAL at 07:30

## 2018-09-22 RX ADMIN — SENNOSIDES AND DOCUSATE SODIUM 1 TABLET: 8.6; 5 TABLET ORAL at 08:17

## 2018-09-22 RX ADMIN — IBUPROFEN 600 MG: 600 TABLET ORAL at 18:04

## 2018-09-22 RX ADMIN — IBUPROFEN 600 MG: 600 TABLET ORAL at 08:17

## 2018-09-22 RX ADMIN — ACETAMINOPHEN 650 MG: 325 TABLET, FILM COATED ORAL at 20:35

## 2018-09-22 NOTE — PLAN OF CARE
Problem: Patient Care Overview  Goal: Plan of Care/Patient Progress Review  Outcome: Improving  Movement and sensation returning to legs and up to void with assist of one at 1800; up independently at 2100.  Comfortable on IBU; hydrocortisone brought in to use for hemorrhoids but not used yet.  Using nipple shield to breast feed; attempted hand expression but only able to get a drop on each side.  Tired; encouraged to sleep as able while infant is under warmer in nursery.  Cont with plan of care.

## 2018-09-22 NOTE — LACTATION NOTE
Lactation visit. Visitors in the room at time of feeding. Mom reports baby is latching well with the shield. Reassessed the size and moved to the 20 mm shield from the 24 mm to see if it is a better fit. Unable to observe a feeding as baby not ready and visitors present. Reviewed nipple shield use and care and best time and way to wean from the shield.  Encouraged Mom to call us PRN and plan phone follow up within one week after discharge since going home on a shield.

## 2018-09-22 NOTE — ANESTHESIA POSTPROCEDURE EVALUATION
Patient: Ofelia Will    * No procedures listed *    Diagnosis:* No pre-op diagnosis entered *  Diagnosis Additional Information: Labor pain    Anesthesia Type:  Epidural    Note:  Anesthesia Post Evaluation         Comments:     S/P epidural for labor.   I or my partner was immediately available for management of this patient during epidural analgesia infusion.  VSS.  Doing well. Block resolved.  Neuro at baseline. Denies positional headache. Minimal side effects easily managed w/ PRN meds. No apparent anesthetic complications. No follow-up required.    Ciro Ann MD        Last vitals:  Vitals:    09/21/18 1354 09/21/18 1600 09/22/18 0043   BP: 114/63 95/86 110/72   Pulse:   84   Resp:  20 18   Temp:  98.8  F (37.1  C) 98.1  F (36.7  C)         Electronically Signed By: Ciro Ann MD  September 22, 2018  7:54 AM

## 2018-09-22 NOTE — PROGRESS NOTES
"Postpartum Progress Note  Ofelia Will  [id number]    Subjective:   Pain relatively well controlled with POs and tucks pads, hemorrhoid pain is the worst. Denies nausea and vomiting. Ambulating without difficulty. Adequate PO intake. Breast feeding.     Objective:  /73  Pulse 86  Temp 98.9  F (37.2  C) (Oral)  Resp 18  Ht 1.588 m (5' 2.5\")  Wt 83 kg (183 lb)  LMP 2017  Breastfeeding? Unknown  BMI 32.94 kg/m2  General: resting comfortably, in NAD  Heart: regular rate, well perfused  Lungs: non-labored breathing, no cough  Abdomen: soft, non distended, appropriately tender to palpation, FF at U -1  Extremities: +1 edema, non-tender to palpation    Labs:  Hemoglobin   Date Value Ref Range Status   2018 13.3 11.7 - 15.7 g/dL Final   2018 11.0 (L) 11.7 - 15.7 g/dL Final       Assessment/Plan:  28 year old  who is PPD#1 s/p .  Currently stable and doing well  - Routine post-partum cares  - Heme: no s/sx ABLA  - Hemorrhoid: reviewed preparation H, tucks pads, and sitz baths. Return to clinic if not improving in 2w for further evaluation and possible colorectal referal  - Pain: continue tylenol, ibuprofen, ice packs, hot packs as needed  - Baby: in room doing well  - Dispo: d/c to home PPD#2    Ellen Summers MD  OB/GYN             "

## 2018-09-22 NOTE — PLAN OF CARE
Problem: Patient Care Overview  Goal: Plan of Care/Patient Progress Review  Outcome: Improving  Stable patient, up ad farhat and is independent with self and infant cares. Pt taking Ibuprofen, and is using ice and tucks as needed for perineum discomfort, and hydrocortisone for hemorrhoids. Pt is breastfeeding with use of nipple shield, breastfeeding assistance provided as needed. Spouse present and supportive, bonding well with infant.

## 2018-09-22 NOTE — LACTATION NOTE
Lactation follow up.  Returned to help baby latch with the smaller shield. Baby is not coordinating suck and does not willingly latch and suck. Offered things parents could try to get baby to suck like laid back nursing and stimulating her gums. She finally did a few sucks then stopped. Reassured parents baby should start acting more hungry and latch better. Instructed staff nurse with the above to f/up with parents for assistance.

## 2018-09-23 VITALS
BODY MASS INDEX: 32.43 KG/M2 | SYSTOLIC BLOOD PRESSURE: 132 MMHG | HEART RATE: 68 BPM | WEIGHT: 183 LBS | DIASTOLIC BLOOD PRESSURE: 82 MMHG | RESPIRATION RATE: 18 BRPM | TEMPERATURE: 99 F | HEIGHT: 63 IN

## 2018-09-23 PROCEDURE — 25000132 ZZH RX MED GY IP 250 OP 250 PS 637: Performed by: OBSTETRICS & GYNECOLOGY

## 2018-09-23 PROCEDURE — 25000128 H RX IP 250 OP 636: Performed by: OBSTETRICS & GYNECOLOGY

## 2018-09-23 PROCEDURE — 90707 MMR VACCINE SC: CPT | Performed by: OBSTETRICS & GYNECOLOGY

## 2018-09-23 RX ADMIN — IBUPROFEN 600 MG: 600 TABLET ORAL at 08:09

## 2018-09-23 RX ADMIN — PRENATAL VIT W/ FE FUMARATE-FA TAB 27-0.8 MG 1 TABLET: 27-0.8 TAB at 08:08

## 2018-09-23 RX ADMIN — MEASLES, MUMPS, AND RUBELLA VIRUS VACCINE LIVE 0.5 ML: 1000; 12500; 1000 INJECTION, POWDER, LYOPHILIZED, FOR SUSPENSION SUBCUTANEOUS at 21:04

## 2018-09-23 RX ADMIN — SENNOSIDES AND DOCUSATE SODIUM 2 TABLET: 8.6; 5 TABLET ORAL at 21:04

## 2018-09-23 RX ADMIN — IBUPROFEN 600 MG: 600 TABLET ORAL at 14:53

## 2018-09-23 RX ADMIN — SENNOSIDES AND DOCUSATE SODIUM 1 TABLET: 8.6; 5 TABLET ORAL at 08:08

## 2018-09-23 RX ADMIN — IBUPROFEN 600 MG: 600 TABLET ORAL at 01:27

## 2018-09-23 RX ADMIN — IBUPROFEN 600 MG: 600 TABLET ORAL at 21:04

## 2018-09-23 ASSESSMENT — ACTIVITIES OF DAILY LIVING (ADL)
TRANSFERRING: 0-->INDEPENDENT
TOILETING: 0-->INDEPENDENT
COGNITION: 0 - NO COGNITION ISSUES REPORTED
FALL_HISTORY_WITHIN_LAST_SIX_MONTHS: NO
SWALLOWING: 0-->SWALLOWS FOODS/LIQUIDS WITHOUT DIFFICULTY
AMBULATION: 0-->INDEPENDENT
DRESS: 0-->INDEPENDENT
RETIRED_COMMUNICATION: 0-->UNDERSTANDS/COMMUNICATES WITHOUT DIFFICULTY
BATHING: 0-->INDEPENDENT
RETIRED_EATING: 0-->INDEPENDENT

## 2018-09-23 NOTE — PLAN OF CARE
Problem: Patient Care Overview  Goal: Plan of Care/Patient Progress Review  Outcome: Adequate for Discharge Date Met: 09/23/18  Patient up and around voiding without difficulty -taking ibuprofen for pain with relief   Discharge orders received   Will discharge home tonight -baby staying to monitor feeds till evening

## 2018-09-23 NOTE — PLAN OF CARE
Problem: Patient Care Overview  Goal: Plan of Care/Patient Progress Review  Outcome: Improving  Pt up ad farhat.  Pain controlled with ibuprofen.  Tucks and ice packs used for comfort.  Elevated BP x1 overnight.  Other VSS.  Voiding without difficulty.  Breastfeeding with shield.  Also hand express after each feed.  FOB present overnight and supportive.  Plan to discharge today.

## 2018-09-23 NOTE — PROGRESS NOTES
Assumed cares at 1500. Patient meeting expected goals this shift. Able to do all self cares and cares for . Voiding without difficulty. Education and hand expression taught to patient and patient verbalized understanding. Using ibuprofen for pain. Bonding well with . Plan to discharge tomorrow.

## 2018-09-23 NOTE — PROGRESS NOTES
[facility] Obstetrics Post-Partum Progress Note          Assessment and Plan:    Assessment:   Post-partum day #2  Normal spontaneous vaginal delivery  L&D complications: None      Doing well.  Some breast feeding concerns      Plan:   Discharge later today           Interval History:   Doing well.  Pain is well-controlled.  No fevers.  No history of foul-smelling vaginal discharge.  Good appetite.  Denies chest pain, shortness of breath, nausea or vomiting.  Vaginal bleeding is similar to a heavy menstrual flow.  Ambulatory.  Breastfeeding concern.          Significant Problems:    None          Review of Systems:    The patient denies any chest pain, shortness of breath, excessive pain, fever, chills, purulent drainage from the wound, nausea or vomiting.          Medications:   All medications related to the patient's surgery have been reviewed          Physical Exam:     All vitals stable  Patient Vitals for the past 12 hrs:   BP Temp Temp src Pulse Resp   09/23/18 0900 122/73 98.7  F (37.1  C) Oral 77 18   09/23/18 0336 (!) 141/91 98  F (36.7  C) Oral 65 16        Date/Time Temp Pulse Heart Rate BP Resp SpO2 Oxygen Delivery O2 Device 0-10 Pain Scale Weight Who     09/23/18 0900 98.7  F (37.1  C) 77 -- 122/73 18 -- -- -- 2 -- AK     09/23/18 0336 98  F (36.7  C) 65 --  141/91 16 -- -- -- 1 --      09/23/18 0123 -- -- -- -- -- -- -- -- 1 --      09/22/18 2035 -- -- -- -- -- -- -- -- 2 --      09/22/18 1804 -- -- -- -- -- -- -- -- 1 -- KA     09/22/18 1804 -- 68 -- 122/75 18 -- -- -- -- -- TG     09/22/18 0817 98.9  F (37.2  C) 86 -- 121/73 -- -- -- -- 2 -- SD     09/22/18 0054 -- -- -- -- -- -- -- -- 2 -- NN     09/22/18 0043 98.1  F (36.7  C) 84 -- 110/72 18 -- -- -- 1 -- NN                       Uterine fundus is firm, non-tender and at the level of the umbilicus          Data:     All laboratory data related to this surgery reviewed  Hemoglobin   Date Value Ref Range Status   09/20/2018 13.3 11.7 -  15.7 g/dL Final   06/28/2018 11.0 (L) 11.7 - 15.7 g/dL Final   03/01/2018 12.3 11.7 - 15.7 g/dL Final   12/05/2017 14.7 11.7 - 15.7 g/dL Final     No imaging studies have been ordered    Tristan Ivory MD, MD

## 2018-09-24 ENCOUNTER — LACTATION ENCOUNTER (OUTPATIENT)
Age: 28
End: 2018-09-24

## 2018-09-24 NOTE — PLAN OF CARE
Education completed, questions addressed. Patent received Ibuprofen for pain. Stool softeners and MMR vaccine also given. Patient discharge from unit at 2118.

## 2018-09-24 NOTE — LACTATION NOTE
This note was copied from a baby's chart.  LC to se patient and assist with latch.  Baby was STS.  Diaper changed to awaken infant, stool noted at that time. Baby attempted latch without the shield and was able to latch, but did not maintain an active suck pattern.  Shield was applied and infant nursed well in the football hold on the left.  Colostrum also visualized in the nipple shield, filling the lower half.  Frequent swallows present.  Plan for RN to assist with latch on right if needed.  No need for supplement.

## 2018-09-25 ENCOUNTER — NURSE TRIAGE (OUTPATIENT)
Dept: NURSING | Facility: CLINIC | Age: 28
End: 2018-09-25

## 2018-09-26 NOTE — TELEPHONE ENCOUNTER
4 days postpartum (vaginal delivery 9/21) Passed one dark red to reddish brown blood clot today. Other than this bleeding has slowed down and is dark red to reddish brown. Denies any dizziness/lightheadednes. Denies any severe or increased abdominal/pelvic pain. No fever. Advised see provider within 3 days. Advised pt call OB provider tomorrow and ask if she needs to be seen. Provided reassurance. Occasional golf ball size clot not unusual but call if many golf-ball size clots or if larger, increased bleeding, severe pain, or dizziness. Pt voiced understanding and agreement. Lizeth Peralta RN/FNA      Reason for Disposition    [1] Vaginal bleeding or spotting lasts > 4 weeks AND [2] red or red-brown    Additional Information    Negative: Shock suspected (e.g., cold/pale/clammy skin, too weak to stand, low BP, rapid pulse)    Negative: Difficult to awaken or acting confused  (e.g., disoriented, slurred speech)    Negative: Passed out (i.e., lost consciousness, collapsed and was not responding)    Negative: Sounds like a life-threatening emergency to the triager    Negative: SEVERE dizziness (e.g., unable to stand, requires support to walk, feels like passing out now)    Negative: [1] SEVERE abdominal pain AND [2] present > 1 hour    Negative: Fever > 100.4 F (38.0 C)    Negative: SEVERE vaginal bleeding (i.e., soaking 2 pads or tampons per hour and present 2 or more hours)    Negative: Patient sounds very sick or weak to the triager    Negative: MODERATE vaginal bleeding (i.e., soaking 1 pad or tampon per hour and present > 6 hours)    Negative: [1] Constant abdominal pain AND [2] present > 2 hours    Negative: Pale skin (pallor) of new onset or worsening    Negative: Foul smelling vaginal discharge (i.e., lochia)    Negative: Bleeding with > 6 soaked pads per day    Negative: [1] Passing blood clots  AND [2] persists > 4 days postpartum    Negative: Taking Coumadin (warfarin) or other strong blood thinner, or known  bleeding disorder (e.g., thrombocytopenia)    Negative: [1] Skin bruises or nosebleed AND [2] not caused by an injury    Protocols used: POSTPARTUM - VAGINAL BLEEDING AND LOCHIA-ADULT-AH

## 2018-10-01 ENCOUNTER — TELEPHONE (OUTPATIENT)
Dept: OBGYN | Facility: CLINIC | Age: 28
End: 2018-10-01

## 2018-11-01 ENCOUNTER — PRENATAL OFFICE VISIT (OUTPATIENT)
Dept: OBGYN | Facility: CLINIC | Age: 28
End: 2018-11-01
Payer: COMMERCIAL

## 2018-11-01 VITALS
HEIGHT: 63 IN | SYSTOLIC BLOOD PRESSURE: 108 MMHG | BODY MASS INDEX: 26.93 KG/M2 | WEIGHT: 152 LBS | DIASTOLIC BLOOD PRESSURE: 60 MMHG

## 2018-11-01 PROCEDURE — 99207 ZZC POST PARTUM EXAM: CPT | Performed by: OBSTETRICS & GYNECOLOGY

## 2018-11-01 ASSESSMENT — PATIENT HEALTH QUESTIONNAIRE - PHQ9: SUM OF ALL RESPONSES TO PHQ QUESTIONS 1-9: 0

## 2018-11-01 NOTE — MR AVS SNAPSHOT
After Visit Summary   11/1/2018    Ofelia Will    MRN: [id number]           Patient Information     Date Of Birth          1990        Visit Information        Provider Department      11/1/2018 10:15 AM Kelsi Jane MD [facility]        Today's Diagnoses     Routine postpartum follow-up    -  1       Follow-ups after your visit        Your next 10 appointments already scheduled     Nov 07, 2018  2:30 PM CST   Return Visit with Valentine Romero RN   [facility] ([facility])    [address] [facility] [address]  [facility] [id number] [phone]              Who to contact     If you have questions or need follow up information about today's clinic visit or your schedule please contact [facility] directly at [phone].  Normal or non-critical lab and imaging results will be communicated to you by MyChart, letter or phone within 4 business days after the clinic has received the results. If you do not hear from us within 7 days, please contact the clinic through MyChart or phone. If you have a critical or abnormal lab result, we will notify you by phone as soon as possible.  Submit refill requests through [employer] or call your pharmacy and they will forward the refill request to us. Please allow 3 business days for your refill to be completed.          Additional Information About Your Visit        MyChart Information     [employer] gives you secure access to your electronic health record. If you see a primary care provider, you can also send messages to your care team and make appointments. If you have questions, please call your primary care clinic.  If you do not have a primary care provider, please call [phone] and they will assist you.        Care EveryWhere ID     This is your Care EveryWhere ID. This could be used by other organizations to access your [place] medical records  [id number]        Your  "Vitals Were     Height Last Period Breastfeeding? BMI (Body Mass Index)          5' 2.5\" (1.588 m) 12/20/2017 Yes 27.36 kg/m2         Blood Pressure from Last 3 Encounters:   11/01/18 108/60   09/23/18 132/82   09/19/18 120/70    Weight from Last 3 Encounters:   11/01/18 152 lb (68.9 kg)   09/20/18 183 lb (83 kg)   09/19/18 183 lb (83 kg)              Today, you had the following     No orders found for display       Primary Care Provider Office Phone # Fax #    Guerda Esquivel [phone] [id number]  [facility] [address]        Equal Access to Services     MAHIN STEARNS : Frida vasqueso Elin, wagersonda luqadaha, qaybta kaalmada tyeshayajade, viki gonzalez. So [facility] [phone].    ATENCIÓN: Si habla español, tiene a ferris disposición servicios gratuitos de asistencia lingüística. Ll[facility]eter[facility] Administration [phone].    We comply with applicable federal civil rights laws and [place] laws. We do not discriminate on the basis of race, color, national origin, age, disability, sex, sexual orientation, or gender identity.            Thank you!     Thank you for choosing [facility]  for your care. Our goal is always to provide you with excellent care. Hearing back from our patients is one way we can continue to improve our services. Please take a few minutes to complete the written survey that you may receive in the mail after your visit with us. Thank you!             Your Updated Medication List - Protect others around you: Learn how to safely use, store and throw away your medicines at www.disposemymeds.org.          This list is accurate as of 11/1/18  3:25 PM.  Always use your most recent med list.                   Brand Name Dispense Instructions for use Diagnosis    prenatal multivitamin plus iron 27-0.8 MG Tabs per tablet     100 tablet    Take 1 tablet by mouth daily    Supervision of normal first pregnancy, antepartum       ranitidine 75 MG tablet    " ZANTAC    30 tablet    Take 2 tablets (150 mg) by mouth 2 times daily    Gastroesophageal reflux disease without esophagitis

## 2018-11-01 NOTE — PROGRESS NOTES
"  Ofelia is here for a 6-week postpartum checkup.    She had a  of a viable girl, weight 6 pounds 13 oz., with none complications. Date of delivery was 18. Since delivery, she has been breast feeding.  She has no signs of infection, bleeding or other complications.  She is not pregnant.  We discussed contraceptions and she has chosen condoms.      Post partum tubal: No  History of Gestational Diabetes? No  Type of Delivery:  Vaginal  Feeding Method:  Breast  If initiated breast feeding and stopped, how long did you breast feed?:  Not applicable    REVIEW OF SYSTEMS:  Completed and normal.  Contraception Plan: condoms    Medical History Reviewed yes -   Family History Reviewed yes -   Problem List Updated no    EXAM:  /60  Ht 5' 2.5\" (1.588 m)  Wt 152 lb (68.9 kg)  LMP 2017  Breastfeeding? Yes  BMI 27.36 kg/m2  HEENT: grossly normal.  NECK: no lymphadenopathy or thyroidomegaly.  LUNGS: CTA X 2, no rales or crackles.  BACK: No spinal or CVA tenderness.  HEART: RRR without murmurs clicks or gallops.  ABDOMEN: soft, non tender, good bowel sounds, without masses rebound, guarding or tenderness.  PELVIC:  External genitalia; normal without lesion, repair well healed.   Vagina: normal mucosa and rugae, no discharge.  Cervix: multiparous, well healed, without lesion.  Uterus: non pregnant in size, firm , mobile, no lesions,     Normal shape, position and consistency  Adnexa: non tender, without masses.    EXTREMITIES:  warm to touch, good pulses, no ankle edema or calf tenderness.  NEUROLOGIC: grossly normal.    ASSESSMENT:   6-week postpartum exam after .    PLAN:    Contraception methods discussed  Exercise encouraged  Follow up in 1 year.  One-hour glucose tolerance test needed? No  Condoms for contraception.    Kelsi Jane MD      "

## 2018-11-01 NOTE — NURSING NOTE
"Chief Complaint   Patient presents with     Postpartum Care     6wk PP visit.        Initial /60  Ht 5' 2.5\" (1.588 m)  Wt 152 lb (68.9 kg)  LMP 2017  Breastfeeding? Yes  BMI 27.36 kg/m2 Estimated body mass index is 27.36 kg/(m^2) as calculated from the following:    Height as of this encounter: 5' 2.5\" (1.588 m).    Weight as of this encounter: 152 lb (68.9 kg).  BP completed using cuff size: regular    Questioned patient about current smoking habits.  Pt. has never smoked.          The following HM Due: NONE      The following patient reported/Care Every where data was sent to:  P ABSTRACT QUALITY INITIATIVES [[id number]]      N/a. Caitlin Johnson LPN                "

## 2018-11-07 ENCOUNTER — OFFICE VISIT (OUTPATIENT)
Dept: PEDIATRICS | Facility: CLINIC | Age: 28
End: 2018-11-07
Attending: FAMILY MEDICINE
Payer: COMMERCIAL

## 2018-11-07 DIAGNOSIS — Z71.89 ENCOUNTER FOR BREAST FEEDING COUNSELING: Primary | ICD-10-CM

## 2018-11-07 PROCEDURE — G0463 HOSPITAL OUTPT CLINIC VISIT: HCPCS | Mod: ZF

## 2018-11-07 NOTE — MR AVS SNAPSHOT
After Visit Summary   11/7/2018    Ofelia Will    MRN: [id number]           Patient Information     Date Of Birth          1990        Visit Information        Provider Department      11/7/2018 2:30 PM Valentine Romero, RN [facility]        Today's Diagnoses     Encounter for breast feeding counseling    -  1      Care Instructions    Recommendations:   1. Continue breast feeding for about 8 feedings each day. No longer than 4 hours between feedings at night  2. Feed on one side as long as possible before offering other breast       Ideally she should feed completely on one breast per feeding session  3. To support breast in latching, place index finger underneath breast to lift and only thumb on top  4.  I suggest no more than 3 1/2 to 4 ounces for bottle feeding volume    Lactation Consultant: SUN Barriga, RN, IBCLC    Start time: 2:40 End time: 3:25pm    60 minute Lactation Consultation with > 50% of time spent on breastfeeding counseling and coordination of care.    [facility]  Phone (Appts): [phone]           Follow-ups after your visit        Who to contact     If you have questions or need follow up information about today's clinic visit or your schedule please contact [facility] directly at [phone].  Normal or non-critical lab and imaging results will be communicated to you by MyChart, letter or phone within 4 business days after the clinic has received the results. If you do not hear from us within 7 days, please contact the clinic through MyChart or phone. If you have a critical or abnormal lab result, we will notify you by phone as soon as possible.  Submit refill requests through [employer] or call your pharmacy and they will forward the refill request to us. Please allow 3 business days for your refill to be completed.          Additional Information  About Your Visit        [employer] Information     [employer] gives you secure access to your electronic health record. If you see a primary care provider, you can also send messages to your care team and make appointments. If you have questions, please call your primary care clinic.  If you do not have a primary care provider, please call [phone] and they will assist you.        Care EveryWhere ID     This is your Care EveryWhere ID. This could be used by other organizations to access your [place] medical records  [id number]         Blood Pressure from Last 3 Encounters:   11/01/18 108/60   09/23/18 132/82   09/19/18 120/70    Weight from Last 3 Encounters:   11/01/18 68.9 kg (152 lb)   09/20/18 83 kg (183 lb)   09/19/18 83 kg (183 lb)              Today, you had the following     No orders found for display       Primary Care Provider Office Phone # Fax #    Guerda Esquivel [phone] [address] [address]        Equal Access to Services     MAHIN STEARNS : Hadii aad ku hadasho Soomaali, waaxda luqadaha, qaybta kaalmada adeegyada, waxganesh gamez haypatricio camara . So [facility] [place] [phone].    ATENCIÓN: Si habla español, tiene a ferris disposición servicios gratuitos de asistencia lingüística. [facility] [phone].    We comply with applicable federal civil rights laws and [place] laws. We do not discriminate on the basis of race, color, national origin, age, disability, sex, sexual orientation, or gender identity.            Thank you!     Thank you for choosing [facility]  for your care. Our goal is always to provide you with excellent care. Hearing back from our patients is one way we can continue to improve our services. Please take a few minutes to complete the written survey that you may receive in the mail after your visit with us. Thank you!             Your Updated Medication List - Protect others around you: Learn how to safely use,  store and throw away your medicines at www.disposemymeds.org.          This list is accurate as of 11/7/18 11:59 PM.  Always use your most recent med list.                   Brand Name Dispense Instructions for use Diagnosis    prenatal multivitamin plus iron 27-0.8 MG Tabs per tablet     100 tablet    Take 1 tablet by mouth daily    Supervision of normal first pregnancy, antepartum       ranitidine 75 MG tablet    ZANTAC    30 tablet    Take 2 tablets (150 mg) by mouth 2 times daily    Gastroesophageal reflux disease without esophagitis

## 2018-11-09 NOTE — PATIENT INSTRUCTIONS
Recommendations:   1. Continue breast feeding for about 8 feedings each day. No longer than 4 hours between feedings at night  2. Feed on one side as long as possible before offering other breast       Ideally she should feed completely on one breast per feeding session  3. To support breast in latching, place index finger underneath breast to lift and only thumb on top  4.  I suggest no more than 3 1/2 to 4 ounces for bottle feeding volume    Lactation Consultant: SUN Barriga, RN, IBCLC    Start time: 2:40 End time: 3:25pm    60 minute Lactation Consultation with > 50% of time spent on breastfeeding counseling and coordination of care.    [facility]  Phone (Appts): [phone]

## 2018-11-09 NOTE — PROGRESS NOTES
Lactation  Visit    Date of Visit: 2018    Baby's current age: 6 weeks     Reason for Visit: is milk volume adequate at breast as feedings are short duration    Date of last weight:18 done at  [place] [facility]     Last weight:  9 lbs 8 ounces (4309 gm)    Current Weight: 4398 gm (with clothes on) 9 lbs 11 ounces     Feeding Issues/Changes: last 2 weeks finishing feeding in 5 minutes, fussy afterwards, but recently no longer fussy    Supplementing: one bottle prior to bed expressed breast milk 3 1/2 ounces    Pumpinx/day morning and night     Meds/Herbals: prenatal vitamins, colace    Output :  WNL     Feeding Assessment/Weights  78 mL LEFT breast after 8 minutes     2 1/2 ounces in 8 minutes ( 1 ounce =30 mL)  For her current weight Oswaldo should be taking about 88 mL or about 3 ounces each feeding for 8 feedings in a day (1 ounce = 30 mL)      Summary:   Oswaldo latched easily without nipple shield. Immediate letdown noted with coordinated suck and swallow. Continuous sucking for 8 minutes.  Faint intermittent stridor noted at end of feeding.    Recommendations:   1. Continue breast feeding for about 8 feedings each day. No longer than 4 hours between feedings at night  2. Feed on one side as long as possible before offering other breast       Ideally she should feed completely on one breast per feeding session  3. To support breast in latching, place index finger underneath breast to lift and only thumb on top  4.  I suggest no more than 3 1/2 to 4 ounces for bottle feeding volume    Lactation Consultant: SUN Barriga, RN, IBCLC    Start time: 2:40 End time: 3:25pm    60 minute Lactation Consultation with > 50% of time spent on breastfeeding counseling and coordination of care.    [facility]  Phone (Appts): [phone]

## 2018-12-04 PROBLEM — Z33.1 PREGNANCY, INCIDENTAL: Status: RESOLVED | Noted: 2018-08-22 | Resolved: 2018-12-04

## 2018-12-04 PROBLEM — M53.3 SACROILIAC JOINT PAIN: Status: RESOLVED | Noted: 2018-08-22 | Resolved: 2018-12-04

## 2018-12-04 PROBLEM — R10.2 PELVIC PAIN IN FEMALE: Status: RESOLVED | Noted: 2018-08-22 | Resolved: 2018-12-04

## 2018-12-04 NOTE — PROGRESS NOTES
Subjective:  HPI                    Objective:  System    Physical Exam    General     ROS    Assessment/Plan:    DISCHARGE REPORT    Progress reporting period is from 8/22/2018 to 8/28/2018.     SUBJECTIVE    Subjective: Patient reports continued anterior pelvic pain especially with prolonged sitting or standing (especially sit to stand)   Current Pain level: 4/10   Initial Pain level: 4/10   Changes in function: No changes noted in function since last SOAP note   Adverse reactions: Activity:;   , Adverse reaction activity: standing, working   Patient has failed to return to therapy so current objective findings are unknown.    OBJECTIVE  Objective: Pelvis in good alignment. Good follow through with techniques with transitional movements. Added gentle, symmetrical core exs and tolerated well      ASSESSMENT/PLAN    STG/LTGs have been met or progress has been made towards goals:  Yes (See Goal flow sheet completed today.)  Assessment of Progress: The patient's condition is improving.  The patient's condition has potential to improve.  Self Management Plans:  Patient has been instructed in a home treatment program.  Patient  has been instructed in self management of symptoms.  The patient failed to complete scheduled/ordered appointments so current information is unknown.    Recommendations:    This patient will be discharged from therapy and continue their home treatment program.    Please refer to the daily flowsheet for treatment today, total treatment time and time spent performing 1:1 timed codes.

## 2018-12-29 ENCOUNTER — OFFICE VISIT (OUTPATIENT)
Dept: FAMILY MEDICINE | Facility: CLINIC | Age: 28
End: 2018-12-29
Payer: COMMERCIAL

## 2018-12-29 VITALS
SYSTOLIC BLOOD PRESSURE: 116 MMHG | DIASTOLIC BLOOD PRESSURE: 76 MMHG | OXYGEN SATURATION: 99 % | HEART RATE: 95 BPM | WEIGHT: 152 LBS | RESPIRATION RATE: 14 BRPM | BODY MASS INDEX: 27.36 KG/M2 | TEMPERATURE: 97.7 F

## 2018-12-29 DIAGNOSIS — N75.0 INFECTED CYST OF BARTHOLIN'S GLAND DUCT: Primary | ICD-10-CM

## 2018-12-29 DIAGNOSIS — E66.3 OVERWEIGHT (BMI 25.0-29.9): ICD-10-CM

## 2018-12-29 PROCEDURE — 99213 OFFICE O/P EST LOW 20 MIN: CPT | Performed by: FAMILY MEDICINE

## 2018-12-29 RX ORDER — AMOXICILLIN 875 MG
875 TABLET ORAL 2 TIMES DAILY
Qty: 20 TABLET | Refills: 0 | Status: SHIPPED | OUTPATIENT
Start: 2018-12-29 | End: 2019-01-08

## 2018-12-29 NOTE — PATIENT INSTRUCTIONS
1. .warm soaks 10min and press dry     2.  Weight Loss Tips  1. Do not eat after 6 hrs before your expected bedtime  2. Have your heaviest meal for breakfast, a slightly lighter meal at lunch and a snack 6 hrs before bed  3. No sugar/calorie drinks except milk ie no fruit juice, pop, alcohol.  4. Drink milk 30min before meals to decrease your hunger. Also it is excellent as part of your last meal of the day snack  5. Drink lots of water  6. Increase fiber in diet: all bran cereal, salads, popcorn etc  7. Have only one small serving of fruit a day about 1/2 cup (as this is high in sugar)  8. EXERCISE is the bottom line. Without it, you will gain weight even on a low calorie diet. Best if done 2-3X a day as can    Being overweight contributes to high blood pressure and high cholesterol, both of which cause heart attacks, strokes and kidney failure, prediabetes and diabetes, arthritis, and liver disease     3. If not better , see your OB

## 2018-12-29 NOTE — PROGRESS NOTES
SUBJECTIVE:   Ofelia Will is a 28 year old female who presents to clinic today for the following health issues:      Rt Bartholins Gland Cyst Infection       Duration: 3 d and increasing     Description (location/character/radiation): patient is here today for vaginal swelling on the vulva, patient is 14 weeks post partum, no pain    Intensity:  mild, moderate    Accompanying signs and symptoms: see above    History (similar episodes/previous evaluation): None    Precipitating or alleviating factors: None    Therapies tried and outcome: None       OVERWEIGHT    -BMI = 27  -14 weeks pp    Problem list and histories reviewed & adjusted, as indicated.  Additional history: as documented    Labs reviewed in EPIC    Reviewed and updated as needed this visit by clinical staff  Tobacco  Allergies  Meds  Problems  Med Hx  Surg Hx  Fam Hx  Soc Hx        Reviewed and updated as needed this visit by Provider  Tobacco  Allergies  Meds  Problems  Med Hx  Surg Hx  Fam Hx         ROS:  CONSTITUTIONAL: NEGATIVE for fever, chills, change in weight  INTEGUMENTARY/SKIN: NEGATIVE for worrisome rashes, moles or lesions  EYES: NEGATIVE for vision changes or irritation  ENT/MOUTH: NEGATIVE for ear, mouth and throat problems  RESP: NEGATIVE for significant cough or SOB  BREAST: NEGATIVE for masses, tenderness or discharge  CV: NEGATIVE for chest pain, palpitations or peripheral edema  GI: NEGATIVE for nausea, abdominal pain, heartburn, or change in bowel habits   female: vaginal infection and swelling   MUSCULOSKELETAL: NEGATIVE for significant arthralgias or myalgia  NEURO: NEGATIVE for weakness, dizziness or paresthesias  ENDOCRINE: NEGATIVE for temperature intolerance, skin/hair changes  HEME: NEGATIVE for bleeding problems  PSYCHIATRIC: NEGATIVE for changes in mood or affect    OBJECTIVE:     /76   Pulse 95   Temp 97.7  F (36.5  C) (Tympanic)   Resp 14   Wt 68.9 kg (152 lb)   LMP 12/15/2018  (Approximate)   SpO2 99%   Breastfeeding? Yes   BMI 27.36 kg/m    Body mass index is 27.36 kg/m .  GENERAL: healthy, alert and no distress  EYES: Eyes grossly normal to inspection, PERRL and conjunctivae and sclerae normal  RESP: lungs clear to auscultation - no rales, rhonchi or wheezes  ABDOMEN: soft, nontender, no hepatosplenomegaly, no masses and bowel sounds normal   (female): normal urethral meatus , vaginal mucosa pink, moist, well rugated and Bartholin's gland Rt soft and fluctuant --slightly tender   MS: no gross musculoskeletal defects noted, no edema  SKIN: no suspicious lesions or rashes  NEURO: Normal strength and tone, mentation intact and speech normal  PSYCH: mentation appears normal, affect normal/bright    Diagnostic Test Results:  Results for orders placed or performed during the hospital encounter of 09/20/18   Treponema Abs w Reflex to RPR and Titer   Result Value Ref Range    Treponema Antibodies Nonreactive NR^Nonreactive   Hemoglobin   Result Value Ref Range    Hemoglobin 13.3 11.7 - 15.7 g/dL   ABO and Rh   Result Value Ref Range    ABO O     RH(D) Pos     Specimen Expires 09/23/2018        ASSESSMENT/PLAN:               ICD-10-CM    1. Infected cyst of Bartholin's gland duct RT 12-26-18 N75.0 amoxicillin (AMOXIL) 875 MG tablet   2. Overweight (BMI 25.0-29.9) E66.3        Patient Instructions   1. .warm soaks 10min and press dry     2.  Weight Loss Tips  1. Do not eat after 6 hrs before your expected bedtime  2. Have your heaviest meal for breakfast, a slightly lighter meal at lunch and a snack 6 hrs before bed  3. No sugar/calorie drinks except milk ie no fruit juice, pop, alcohol.  4. Drink milk 30min before meals to decrease your hunger. Also it is excellent as part of your last meal of the day snack  5. Drink lots of water  6. Increase fiber in diet: all bran cereal, salads, popcorn etc  7. Have only one small serving of fruit a day about 1/2 cup (as this is high in sugar)  8.  EXERCISE is the bottom line. Without it, you will gain weight even on a low calorie diet. Best if done 2-3X a day as can    Being overweight contributes to high blood pressure and high cholesterol, both of which cause heart attacks, strokes and kidney failure, prediabetes and diabetes, arthritis, and liver disease     3. If not better , see your OB       Willow Cummings MD  [facility]    Weight management plan: Discussed healthy diet and exercise guidelines  .Willow Cummings MD

## 2019-01-25 ENCOUNTER — OFFICE VISIT (OUTPATIENT)
Dept: OBGYN | Facility: CLINIC | Age: 29
End: 2019-01-25
Payer: COMMERCIAL

## 2019-01-25 VITALS — DIASTOLIC BLOOD PRESSURE: 70 MMHG | SYSTOLIC BLOOD PRESSURE: 114 MMHG

## 2019-01-25 DIAGNOSIS — N89.8 SKIN TAG OF VAGINAL MUCOSA: Primary | ICD-10-CM

## 2019-01-25 PROCEDURE — 99213 OFFICE O/P EST LOW 20 MIN: CPT | Performed by: OBSTETRICS & GYNECOLOGY

## 2019-01-25 NOTE — PROGRESS NOTES
"  SUBJECTIVE:                                                     Ofelia Will is a 28 year old female  who presents to clinic today for follow-up of what she was told was a likely Bartholin's duct cyst.  When wiping on , she noticed a swollen area or something that \"felt different\" right at the opening of the vagina.  This is nontender.  She has not looked at it herself.  She has had intercourse without pain.  She denies fevers, chills, abnormal discharge, urinary or bowel symptoms.  She does not have pain with wiping.  This is not changed since she first noticed it.  She was put on amoxicillin and told to follow-up with OB/GYN if it did not improve.  She states it is not changed at all.  It is also not really bothering her, other than the fact that she is worried about it.      Problem list and histories reviewed & adjusted, as indicated.  Additional history: as documented.    Patient Active Problem List   Diagnosis     H/O atrial septal defect repair     Cystic fibrosis carrier     Patient [facility]     Encounter for supervision of normal first pregnancy     Indication for care in labor or delivery     Vaginal delivery     Infected cyst of Bartholin's gland duct RT 18     Overweight (BMI 25.0-29.9)     Past Surgical History:   Procedure Laterality Date     REPAIR ATRIAL SEPTAL DEFECT      at age 11 to 13      TONSILLECTOMY        Social History     Tobacco Use     Smoking status: Never Smoker     Smokeless tobacco: Never Used   Substance Use Topics     Alcohol use: No      Problem (# of Occurrences) Relation (Name,Age of Onset)    Breast Cancer (1) Mother    Cerebrovascular Disease (1) Father: loss of vision one eye , age 54    Chronic Obstructive Pulmonary Disease (1) Mother    Depression (1) Mother    Hyperlipidemia (1) Father    Hypertension (1) Mother    Myocardial Infarction (2) Maternal Grandfather: age 50, Paternal " Grandfather              Current Outpatient Medications on File Prior to Visit:  Prenatal Vit-Fe Fumarate-FA (PRENATAL MULTIVITAMIN PLUS IRON) 27-0.8 MG TABS per tablet Take 1 tablet by mouth daily     No current facility-administered medications on file prior to visit.   No Known Allergies    ROS:  As above in HPI    OBJECTIVE:     Exam:  Constitutional:  Appearance: Well nourished, well developed alert, in no acute distress  Chest:  Respiratory Effort:  Breathing unlabored  Cardiovascular: no edema.  Pelvic Exam:  External Genitalia:     Normal appearance for age, no discharge present, no tenderness present, no inflammatory lesions present, color normal  Vagina:     Normal vaginal vault without central or paravaginal defects, no discharge present, no inflammatory lesions present, no masses present.  The area in question is a 1 cm skin tag at the introitus, normal in appearance, likely a change from before her vaginal delivery and one that happened as a result of that.  No evidence of a Bartholin cyst is seen, there is no abnormal discharge, no pathology present.  Perineum:     Perineum within normal limits, no evidence of trauma, no rashes or skin lesions present  Anus:     Hemorrhoidal skin tag noted at 12:00      In-Clinic Test Results:  No results found for this or any previous visit (from the past 24 hour(s)).    ASSESSMENT/PLAN:                                                        ICD-10-CM    1. Skin tag of vaginal mucosa L91.8          We discussed that this is a skin tag, likely a change as a result of tearing and healing after the repair from her vaginal delivery.  There is nothing at all that needs to be done about this unless it is bothering her.  If she does start to have pain with intercourse, exercise, or any other concerns, I have asked her to follow-up with me right away, and we will try to work her in for an exam.  I doubt that this will happen.    Kelsi Jane MD  [facility]  [place]

## 2019-01-25 NOTE — NURSING NOTE
"Chief Complaint   Patient presents with     Vaginal Problem     put on Amoxicillin , has not changed in size       Initial /70 (BP Location: Right arm, Patient Position: Chair, Cuff Size: Adult Regular)   LMP 2019 (Exact Date)  Estimated body mass index is 27.36 kg/m  as calculated from the following:    Height as of 18: 1.588 m (5' 2.5\").    Weight as of 18: 68.9 kg (152 lb).  BP completed using cuff size: regular    Questioned patient about current smoking habits.  Pt. has never smoked.          The following HM Due: NONE    Tracee Barron, CMA  18}              "

## 2019-08-05 ENCOUNTER — NURSE TRIAGE (OUTPATIENT)
Dept: NURSING | Facility: CLINIC | Age: 29
End: 2019-08-05

## 2019-08-06 NOTE — TELEPHONE ENCOUNTER
S: Calling about medication question.  B: Is 6 weeks pregnant.  While filling out questioner for her first prenatal appointment with Darrick Deleon se became worried because she has taken Vicks Cold medicine for 4 days.    A: Has an appointment of 8/5 suggested she bring this up at her appointment.  R: Writer later called Ofelia back with the suggestion of calling a pharmacist with her question.  Farzaneh Orozco RN, [facility].      Reason for Disposition    Health Information question, no triage required and triager able to answer question    Protocols used: INFORMATION ONLY CALL-A-

## 2019-08-12 ENCOUNTER — PRENATAL OFFICE VISIT (OUTPATIENT)
Dept: NURSING | Facility: CLINIC | Age: 29
End: 2019-08-12
Payer: COMMERCIAL

## 2019-08-12 VITALS
HEIGHT: 63 IN | HEART RATE: 80 BPM | WEIGHT: 148 LBS | SYSTOLIC BLOOD PRESSURE: 110 MMHG | DIASTOLIC BLOOD PRESSURE: 60 MMHG | BODY MASS INDEX: 26.22 KG/M2

## 2019-08-12 DIAGNOSIS — Z34.81 ENCOUNTER FOR SUPERVISION OF OTHER NORMAL PREGNANCY IN FIRST TRIMESTER: Primary | ICD-10-CM

## 2019-08-12 LAB
ABO + RH BLD: NORMAL
ABO + RH BLD: NORMAL
BLD GP AB SCN SERPL QL: NORMAL
BLOOD BANK CMNT PATIENT-IMP: NORMAL
ERYTHROCYTE [DISTWIDTH] IN BLOOD BY AUTOMATED COUNT: 12.1 % (ref 10–15)
HCG, QUAL URINE: POSITIVE
HCT VFR BLD AUTO: 40.1 % (ref 35–47)
HGB BLD-MCNC: 13.5 G/DL (ref 11.7–15.7)
MCH RBC QN AUTO: 29.8 PG (ref 26.5–33)
MCHC RBC AUTO-ENTMCNC: 33.7 G/DL (ref 31.5–36.5)
MCV RBC AUTO: 89 FL (ref 78–100)
PLATELET # BLD AUTO: 254 10E9/L (ref 150–450)
RBC # BLD AUTO: 4.53 10E12/L (ref 3.8–5.2)
SPECIMEN EXP DATE BLD: NORMAL
WBC # BLD AUTO: 7.3 10E9/L (ref 4–11)

## 2019-08-12 PROCEDURE — 87389 HIV-1 AG W/HIV-1&-2 AB AG IA: CPT | Performed by: OBSTETRICS & GYNECOLOGY

## 2019-08-12 PROCEDURE — 86850 RBC ANTIBODY SCREEN: CPT | Performed by: OBSTETRICS & GYNECOLOGY

## 2019-08-12 PROCEDURE — 99207 ZZC NO CHARGE NURSE ONLY: CPT

## 2019-08-12 PROCEDURE — 36415 COLL VENOUS BLD VENIPUNCTURE: CPT | Performed by: OBSTETRICS & GYNECOLOGY

## 2019-08-12 PROCEDURE — 85027 COMPLETE CBC AUTOMATED: CPT | Performed by: OBSTETRICS & GYNECOLOGY

## 2019-08-12 PROCEDURE — 84703 CHORIONIC GONADOTROPIN ASSAY: CPT

## 2019-08-12 PROCEDURE — 86780 TREPONEMA PALLIDUM: CPT | Performed by: OBSTETRICS & GYNECOLOGY

## 2019-08-12 PROCEDURE — 87591 N.GONORRHOEAE DNA AMP PROB: CPT | Performed by: OBSTETRICS & GYNECOLOGY

## 2019-08-12 PROCEDURE — 86900 BLOOD TYPING SEROLOGIC ABO: CPT | Performed by: OBSTETRICS & GYNECOLOGY

## 2019-08-12 PROCEDURE — 86901 BLOOD TYPING SEROLOGIC RH(D): CPT | Performed by: OBSTETRICS & GYNECOLOGY

## 2019-08-12 PROCEDURE — 87340 HEPATITIS B SURFACE AG IA: CPT | Performed by: OBSTETRICS & GYNECOLOGY

## 2019-08-12 PROCEDURE — 87491 CHLMYD TRACH DNA AMP PROBE: CPT | Performed by: OBSTETRICS & GYNECOLOGY

## 2019-08-12 PROCEDURE — 86762 RUBELLA ANTIBODY: CPT | Performed by: OBSTETRICS & GYNECOLOGY

## 2019-08-12 ASSESSMENT — MIFFLIN-ST. JEOR: SCORE: 1362.51

## 2019-08-12 NOTE — PROGRESS NOTES
Prenatal book and folder (containing standard educational hand-outs and brochures) given to patient. Information in folder reviewed. Questions answered. Brochure given on optional screening available to assess chromosomal anomalies. Pt advised to call the clinic if she has any questions or concerns related to her pregnancy. Prenatal labs obtained. New prenatal visit scheduled on 9/12 with Dr Jane.    7w2d    Lab Results   Component Value Date    PAP NIL 03/05/2018           Patient supplied answers from flow sheet for:  Prenatal OB Questionnaire.  Past Medical History  Diabetes?: No  Hypertension : No  Heart disease, mitral valve prolapse or rheumatic fever?: No  An autoimmune disease such as lupus or rheumatoid arthritis?: No  Kidney disease or urinary tract infection?: No  Epilepsy, seizures or spells?: No  Migraine headaches?: No  A stroke or loss of function or sensation?: No  Any other neurological problems?: No  Have you ever been treated for depression?: No  Are you having problems with crying spells or loss of self-esteem?: No  Have you ever required psychiatric care?: No  Have you ever had hepatitis, liver disease or jaundice?: No  Have you been treated for blood clots in your veins, deep vein thromosis, inflammation in the veins, thrombosis, phlebitis, pulmonary embolism or varicosities?: No  Have you had excessive bleeding after surgery or dental work?: No  Do you bleed more than other women after a cut or scratch?: No  Do you have a history of anemia?: (!) Yes  Have you ever had thyroid problems or taken thyroid medication?: No   Do you have any endocrine problems?: No  Have you ever been in a major accident or suffered serious trauma?: No  Within the last year, has anyone hit, slapped, kicked or otherwise hurt you?: No  In the last year, has anyone forced you to have sex when you didn't want to?: No    Past Medical History 2   Have you ever received a blood transfusion?: No  Would you refuse a blood  transfusion if a doctor judged it to be medically necessary?: No  Does anyone in your home smoke?: No  Do you use tobacco products?: No  Do you drink beer, wine or hard liquor?: No  Do you use any of the following: marijuana, speed, cocaine, heroin, hallucinogens or other drugs?: No   Is your blood type Rh negative?: No  Have you ever had abnormal antibodies in your blood?: No  Have you ever had asthma?: No  Have you ever had tuberculosis?: No  Do you have any allergies to drugs or over-the-counter medications?: No  Allergies: Dust Mites, Aspartame, Ethanol, Venlafaxine, Hydrochloride, Sertraline: No  Have you had any breast problems?: No  Have you ever ?: (!) Yes  Have you had any gynecological surgical procedures such as cervical conization, a LEEP procedure, laser treatment, cryosurgery of the cervix or a dilation and curettage, etc?: No  Have you ever had any other surgical procedures?: No  Have you been hospitalized for a nonsurgical reason excluding normal delivery?: No  Have you ever had any anesthetic complications?: No  Have you ever had an abnormal pap smear?: No    Past Medical History (Continued)  Do you have a history of abnormalities of the uterus?: No  Did your mother take MIKE or any other hormones when she was pregnant with you?: No  Did it take you more than a year to become pregnant?: No  Have you ever been evaluated or treated for infertility?: No  Is there a history of medical problems in your family, which you feel may be important to this pregnancy?: No  Do you have any other problems we have not asked about which you feel may be important to this pregnancy?: No    Symptoms since last menstrual period  Do you have any of the following symptoms: abdominal pain, blood in stools or urine, chest pain, shortness of breath, coughing or vomiting up blood, your heart racing or skipping beats, nausea and vomiting, pain on urination or vaginal discharge or bleed: No  Current medications,  including over-the-counter medications, you are using? (If not applicable answer none): prenatals  Will the patient be 35 years old or older at the time of delivery?: No    Has the patient, baby's father or anyone in either family had:  Thalassemia (Italian, Greek, Mediterranean or  background only) and an MCV result less than 80?: No  Neural tube defect such as meningomyelocele, spina bifida or anencephaly?: No  Congenital heart defect?: No  Down's Syndrome?: (!) Yes(FOB's uncle)  Tj-Sachs disease (Pentecostal, Cajun, Serbian-Sioux)?: No  Sickle cell disease or trait ()?: No  Hemophilia or other inherited problems of blood?: No  Muscular dystrophy?: No  Cystic fibrosis?: No  [place]'s chorea?: No  Mental retardation/autism?: No  Any other inherited genetic or chromosomal disorder?: No  Maternal metabolic disorder (e.g Insulin-dependent diabetes, PKU)?: (!) Yes(diabetes)  A child with birth defects not listed above?: No  Recurrent pregnancy loss or stillbirth?: No   Has the patient had any medications/street drugs/alcohol since her last menstrual period?: No  Does the patient or baby's father have any other genetic risks?: No    Infection History   Do you object to being tested for Hepatitis B?: No  Do you object to being tested for HIV?: No   Do you feel that you are at high risk for coming in contact with the AIDS virus?: No  Have you ever been treated for tuberculosis?: No  Have you ever had a positive skin test for tuberculosis?: No  Do you live with someone who has tuberculosis?: No  Have you ever been exposed to tuberculosis?: No  Do you have genital herpes?: No  Does your partner have genital herpes?: No  Have you had a viral illness since your last period?: (!) Yes(cold)  Have you ever had gonorrhea, chlamydia, syphilis, venereal warts, trichomoniasis, pelvic inflammatory disease or any other sexually transmitted disease?: No  Do you know if you are a genital group B streptococcus carrier?: (!)  Yes  Have you had chicken pox/varicella?: No   Have you been vaccinated against chicken Pox?: (!) Yes  Have you had any other infectious diseases?: No        Malini Albert RN

## 2019-08-13 LAB
C TRACH DNA SPEC QL NAA+PROBE: NEGATIVE
HBV SURFACE AG SERPL QL IA: NONREACTIVE
HIV 1+2 AB+HIV1 P24 AG SERPL QL IA: NONREACTIVE
N GONORRHOEA DNA SPEC QL NAA+PROBE: NEGATIVE
RUBV IGG SERPL IA-ACNC: 19 IU/ML
SPECIMEN SOURCE: NORMAL
SPECIMEN SOURCE: NORMAL
T PALLIDUM AB SER QL: NONREACTIVE

## 2019-08-16 ENCOUNTER — NURSE TRIAGE (OUTPATIENT)
Dept: NURSING | Facility: CLINIC | Age: 29
End: 2019-08-16

## 2019-08-16 NOTE — TELEPHONE ENCOUNTER
Patient asking what medication is safe to take during pregnancy for a headache.  FNA reviewed Tylenol dosing with patient.  Also states her forehead temperature is 100.6.  Advised to call back with any concerns/symptoms.

## 2019-08-22 ENCOUNTER — ANCILLARY PROCEDURE (OUTPATIENT)
Dept: ULTRASOUND IMAGING | Facility: CLINIC | Age: 29
End: 2019-08-22
Payer: COMMERCIAL

## 2019-08-22 DIAGNOSIS — Z34.81 ENCOUNTER FOR SUPERVISION OF OTHER NORMAL PREGNANCY IN FIRST TRIMESTER: ICD-10-CM

## 2019-08-22 PROCEDURE — 76801 OB US < 14 WKS SINGLE FETUS: CPT | Performed by: FAMILY MEDICINE

## 2019-09-12 ENCOUNTER — PRENATAL OFFICE VISIT (OUTPATIENT)
Dept: OBGYN | Facility: CLINIC | Age: 29
End: 2019-09-12
Payer: COMMERCIAL

## 2019-09-12 VITALS — SYSTOLIC BLOOD PRESSURE: 114 MMHG | DIASTOLIC BLOOD PRESSURE: 60 MMHG | BODY MASS INDEX: 26.82 KG/M2 | WEIGHT: 149 LBS

## 2019-09-12 DIAGNOSIS — Z34.80 SUPERVISION OF OTHER NORMAL PREGNANCY, ANTEPARTUM: ICD-10-CM

## 2019-09-12 DIAGNOSIS — Z87.74 H/O ATRIAL SEPTAL DEFECT: ICD-10-CM

## 2019-09-12 DIAGNOSIS — O21.9 NAUSEA AND VOMITING DURING PREGNANCY: Primary | ICD-10-CM

## 2019-09-12 PROCEDURE — 99207 ZZC FIRST OB VISIT: CPT | Performed by: OBSTETRICS & GYNECOLOGY

## 2019-09-12 PROCEDURE — 87086 URINE CULTURE/COLONY COUNT: CPT | Performed by: OBSTETRICS & GYNECOLOGY

## 2019-09-12 RX ORDER — ONDANSETRON 4 MG/1
4 TABLET, ORALLY DISINTEGRATING ORAL EVERY 8 HOURS PRN
Qty: 30 TABLET | Refills: 3 | Status: SHIPPED | OUTPATIENT
Start: 2019-09-12 | End: 2019-10-11

## 2019-09-12 NOTE — NURSING NOTE
"Chief Complaint   Patient presents with     Prenatal Care       Initial /60 (BP Location: Right arm, Cuff Size: Adult Regular)   Wt 67.6 kg (149 lb)   LMP 2019   BMI 26.82 kg/m   Estimated body mass index is 26.82 kg/m  as calculated from the following:    Height as of 19: 1.588 m (5' 2.5\").    Weight as of this encounter: 67.6 kg (149 lb).  BP completed using cuff size: regular    Questioned patient about current smoking habits.  Pt. has never smoked.          +headaches for the past 3 day    Sacha Downey, CMA               "

## 2019-09-12 NOTE — PROGRESS NOTES
This is a 29 year old female patient,   who presents for her first obstetrical visit. This pregnancy is Planned, Desired.    EDC Mar 28, 2020 by LMP and Previous US which makes her 11w5d  today.  Her cycles are regular.  Her last menstrual period was normal. Since her LMP, she has experienced  nausea and emesis.  She denies abdominal pain and vaginal bleeding. Ultrasound in the 1st trimester showed EDC consistent with dates by LMP.     OB History    Para Term  AB Living   2 1 1 0 0 1   SAB TAB Ectopic Multiple Live Births   0 0 0 0 1      # Outcome Date GA Lbr Graeme/2nd Weight Sex Delivery Anes PTL Lv   2 Current            1 Term 18 39w2d 02:39 / 05:14 3.09 kg (6 lb 13 oz) F Vag-Spont Nitrous, IV REGIONAL, EPI N BRAD      Name: Oswaldo      Apgar1: 8  Apgar5: 9       History of GDM: No,  PTL : No,  History of HTN in pregnancy: No,  Thrombocytopenia: No,  Shoulder dystocia: No,  Vacuum Extraction: No  PPH: No   3rd of 4th degree laceration: No.   Other complications: No      Since her last LMP she denies use of alcohol, tobacco and street drugs.    HISTORY:  Past Medical History:   Diagnosis Date     ASD (atrial septal defect)     s/p surgical closer age 11 to 13 in [place], asymptomatic several follow ups , last one  neg no further follow up      History of anemia     treatred with iron      History of vitamin D deficiency      Left otitis media 2014    treated iw augmentin     Mental disorder 2014    anxiety     Past Surgical History:   Procedure Laterality Date     REPAIR ATRIAL SEPTAL DEFECT      at age 11 to 13      TONSILLECTOMY       Family History   Problem Relation Age of Onset     Breast Cancer Mother      Chronic Obstructive Pulmonary Disease Mother      Hypertension Mother      Depression Mother      Cerebrovascular Disease Father         loss of vision one eye , age 54     Hyperlipidemia Father      Myocardial Infarction Maternal Grandfather         age 50      Myocardial Infarction Paternal Grandfather      Hypertension Brother      Anxiety Disorder Brother      Social History     Socioeconomic History     Marital status:      Spouse name: Chris     Number of children: Not on file     Years of education: Not on file     Highest education level: Not on file   Occupational History     Occupation: Teller   Social Needs     Financial resource strain: Not on file     Food insecurity:     Worry: Not on file     Inability: Not on file     Transportation needs:     Medical: Not on file     Non-medical: Not on file   Tobacco Use     Smoking status: Never Smoker     Smokeless tobacco: Never Used   Substance and Sexual Activity     Alcohol use: Not Currently     Drug use: No     Sexual activity: Yes     Partners: Male   Lifestyle     Physical activity:     Days per week: Not on file     Minutes per session: Not on file     Stress: Not on file   Relationships     Social connections:     Talks on phone: Not on file     Gets together: Not on file     Attends Shinto service: Not on file     Active member of club or organization: Not on file     Attends meetings of clubs or organizations: Not on file     Relationship status: Not on file     Intimate partner violence:     Fear of current or ex partner: Not on file     Emotionally abused: Not on file     Physically abused: Not on file     Forced sexual activity: Not on file   Other Topics Concern     Parent/sibling w/ CABG, MI or angioplasty before 65F 55M? No   Social History Narrative     to  , 2 cats macrotabby , long hair black cat , Moved from [place] in 2015 due to husbands job. 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      No Known Allergies    Past medical, surgical, social and family history were reviewed and updated in EPIC.    ROS:   12 point review of systems negative other than symptoms noted below.    EXAM:  /60 (BP Location: Right arm, Cuff Size: Adult Regular)   Wt 67.6 kg (149 lb)   LMP 06/22/2019   BMI 26.82 kg/m     BMI: Body mass index is 26.82 kg/m .    EXAM:  Constitutional: Appearance: Well nourished, well developed alert, in no acute distress  Chest:  Respiratory Effort:  Breathing unlabored  Cardiovascular:Heart    Auscultation:  Regular rate, normal rhythm, no murmurs present  Gastrointestinal:  Abdominal Examination:  Abdomen nontender to palpation, tone normal without     rigidity or guarding, no masses present, umbilicus without lesions    Liver and speen:  No hepatomegaly present, liver nontender to palpation    Hernias:  No hernias present    FHTs auscultated at 162.  Skin:  General Inspection:  No rashes present, no lesions present, no areas of  discoloration.  Neurologic/Psychiatric:    Mental Status:  Oriented X3       Pelvis: not needed today.    ASSESSMENT:      ICD-10-CM    1. Nausea and vomiting during pregnancy O21.9 ondansetron (ZOFRAN-ODT) 4 MG ODT tab     Urine Culture Aerobic Bacterial   2. Supervision of other normal pregnancy, antepartum Z34.80    3. H/O atrial septal defect Z86.79        PLAN:    Prenatal labs reviewed. She has no questions.    Discussed options for screening for and diagnosis of chromosomal anomalies, including first trimester screen, noninvasive prenatal testing/cell-free fetal DNA testing, CVS/amniocentesis, quad screen, and ultrasound or comprehensive Level II US at 18-20 weeks. Last pregnancy they did do noninvasive prenatal testing, but they don't think insurance covers that. She is electing ultrasound at 18-20  weeks for now, but will call insurance to see what is covered. May consider quad or first tm screen instead.    For maternal history ASD with repair, plan level II US and then fetal echo as recommended by MFM.    She has tried vit B6 and unisom with minimal help for nausea and vomiting of pregnancy. Will try Zofran, risks, benefits, and alternatives discussed.    Reviewed early pregnancy education, diet, exercise, prenatal vitamins, intercourse. Reviewed the call schedule, labor and delivery, and the schedule of prenatal visits.    Follow up in 4 weeks. She is encouraged to call sooner with questions or concerns.      Kelsi Jane MD

## 2019-09-13 ENCOUNTER — OFFICE VISIT (OUTPATIENT)
Dept: INTERNAL MEDICINE | Facility: CLINIC | Age: 29
End: 2019-09-13
Payer: COMMERCIAL

## 2019-09-13 VITALS
HEART RATE: 109 BPM | RESPIRATION RATE: 16 BRPM | SYSTOLIC BLOOD PRESSURE: 98 MMHG | HEIGHT: 62 IN | OXYGEN SATURATION: 100 % | BODY MASS INDEX: 27.62 KG/M2 | TEMPERATURE: 98.5 F | DIASTOLIC BLOOD PRESSURE: 72 MMHG | WEIGHT: 150.1 LBS

## 2019-09-13 DIAGNOSIS — B35.1 ONYCHOMYCOSIS: Primary | ICD-10-CM

## 2019-09-13 PROBLEM — Z34.00 ENCOUNTER FOR SUPERVISION OF NORMAL FIRST PREGNANCY: Status: RESOLVED | Noted: 2018-06-01 | Resolved: 2019-09-13

## 2019-09-13 PROBLEM — N75.0 INFECTED CYST OF BARTHOLIN'S GLAND DUCT: Status: RESOLVED | Noted: 2018-12-29 | Resolved: 2019-09-13

## 2019-09-13 LAB
BACTERIA SPEC CULT: NO GROWTH
SPECIMEN SOURCE: NORMAL

## 2019-09-13 PROCEDURE — 99213 OFFICE O/P EST LOW 20 MIN: CPT | Performed by: INTERNAL MEDICINE

## 2019-09-13 ASSESSMENT — MIFFLIN-ST. JEOR: SCORE: 1364.1

## 2019-09-13 NOTE — PROGRESS NOTES
"Subjective     Ofelia Will is a 29 year old female who presents to clinic today for the following health issues:    HPI   Thickening of her right great toenail: She reports that she has had gel polish on the toenail for about a year and just took it off a few days ago.  The nail seemed to be quite thick, when she tried to clip it, it clipped back very far as though it had  from the skin.  Is wondering if this is a fungal infection and wondering about possible treatment.  She is not having any pain associated with it.    She is 12 weeks pregnant.              Patient Active Problem List   Diagnosis     H/O atrial septal defect repair     Cystic fibrosis carrier     Patient [facility]     Overweight (BMI 25.0-29.9)     Current Outpatient Medications   Medication Sig Dispense Refill     ondansetron (ZOFRAN-ODT) 4 MG ODT tab Take 1 tablet (4 mg) by mouth every 8 hours as needed for nausea 30 tablet 3     Prenatal Vit-Fe Fumarate-FA (PRENATAL MULTIVITAMIN PLUS IRON) 27-0.8 MG TABS per tablet Take 1 tablet by mouth daily 100 tablet 3     ranitidine (ZANTAC) 75 MG tablet Take 150 mg by mouth 2 times daily        Reviewed and updated as needed this visit by Provider         Review of Systems   No nail pain      Objective    BP 98/72   Pulse 109   Temp 98.5  F (36.9  C) (Oral)   Resp 16   Ht 1.575 m (5' 2\")   Wt 68.1 kg (150 lb 1.6 oz)   LMP 06/22/2019   SpO2 100%   BMI 27.45 kg/m    Body mass index is 27.45 kg/m .  Physical Exam     The right great toenail is significantly thickened with friable material underneath, yellowish discoloration though the very base appears to be less opaque, less yellowed.  There is a little bit of cracking horizontally of the nail            Assessment & Plan     1. Onychomycosis  Advised most likely this is onychomycosis, discussed briefly potential sources.  Advised oral medication will be contraindicated due to " pregnancy and would not be used when nursing.  She can try using clotrimazole cream topically to help suppress fungal growth though it likely will not cure it.  Would not recommend other over-the-counter thinning agents due to questionability of safety during pregnancy.    Advised if there is significant pain and problems with the nail during pregnancy, the next step would be to see podiatry consideration of removing the nail but since it is not bothering her I would not recommend this at this time.    After pregnancy and nursing are completed, could consider oral medication           No follow-ups on file.    Jacquie Campbell MD  [facility]

## 2019-09-13 NOTE — NURSING NOTE
"BP 98/72   Pulse 109   Temp 98.5  F (36.9  C) (Oral)   Resp 16   Ht 1.575 m (5' 2\")   Wt 68.1 kg (150 lb 1.6 oz)   LMP 06/22/2019   SpO2 100%   BMI 27.45 kg/m      "

## 2019-10-07 ENCOUNTER — TRANSCRIBE ORDERS (OUTPATIENT)
Dept: MATERNAL FETAL MEDICINE | Facility: CLINIC | Age: 29
End: 2019-10-07

## 2019-10-07 DIAGNOSIS — O26.90 PREGNANCY RELATED CONDITION, ANTEPARTUM: Primary | ICD-10-CM

## 2019-10-11 ENCOUNTER — PRENATAL OFFICE VISIT (OUTPATIENT)
Dept: OBGYN | Facility: CLINIC | Age: 29
End: 2019-10-11
Payer: COMMERCIAL

## 2019-10-11 VITALS — BODY MASS INDEX: 27.14 KG/M2 | WEIGHT: 148.4 LBS | DIASTOLIC BLOOD PRESSURE: 66 MMHG | SYSTOLIC BLOOD PRESSURE: 116 MMHG

## 2019-10-11 DIAGNOSIS — Z34.80 PRENATAL CARE OF MULTIGRAVIDA, ANTEPARTUM: ICD-10-CM

## 2019-10-11 PROCEDURE — 99207 ZZC COMPLICATED OB VISIT: CPT | Performed by: OBSTETRICS & GYNECOLOGY

## 2019-10-11 RX ORDER — CALCIUM POLYCARBOPHIL 625 MG 625 MG/1
TABLET ORAL
Status: ON HOLD | COMMUNITY
Start: 2019-10-01 | End: 2020-03-19

## 2019-10-11 RX ORDER — FAMOTIDINE 10 MG
TABLET ORAL
Status: ON HOLD | COMMUNITY
Start: 2019-10-01 | End: 2020-03-22

## 2019-10-11 NOTE — PROGRESS NOTES
No c/o's.  Has MFM U/S in 4 weeks. Flu shot done.  Declined  testing.  RTC 4 weeks.    Encounter Diagnosis   Name Primary?     Prenatal care of multigravida, antepartum        Risk factors listed above are stable and being addressed as noted.    Patel Ladd MD  [facility]

## 2019-10-29 ENCOUNTER — PRE VISIT (OUTPATIENT)
Dept: MATERNAL FETAL MEDICINE | Facility: CLINIC | Age: 29
End: 2019-10-29

## 2019-10-30 ENCOUNTER — OFFICE VISIT (OUTPATIENT)
Dept: MATERNAL FETAL MEDICINE | Facility: CLINIC | Age: 29
End: 2019-10-30
Attending: OBSTETRICS & GYNECOLOGY
Payer: COMMERCIAL

## 2019-10-30 ENCOUNTER — HOSPITAL ENCOUNTER (OUTPATIENT)
Dept: LAB | Facility: CLINIC | Age: 29
End: 2019-10-30
Attending: OBSTETRICS & GYNECOLOGY
Payer: COMMERCIAL

## 2019-10-30 ENCOUNTER — HOSPITAL ENCOUNTER (OUTPATIENT)
Dept: ULTRASOUND IMAGING | Facility: CLINIC | Age: 29
Discharge: HOME | End: 2019-10-30
Attending: OBSTETRICS & GYNECOLOGY | Admitting: OBSTETRICS & GYNECOLOGY
Payer: COMMERCIAL

## 2019-10-30 DIAGNOSIS — Z34.80 PRENATAL CARE OF MULTIGRAVIDA, ANTEPARTUM: ICD-10-CM

## 2019-10-30 DIAGNOSIS — O28.3 ECHOGENIC INTRACARDIAC FOCUS OF FETUS ON PRENATAL ULTRASOUND: ICD-10-CM

## 2019-10-30 DIAGNOSIS — O28.3 ABNORMAL FETAL ULTRASOUND: ICD-10-CM

## 2019-10-30 DIAGNOSIS — O26.90 PREGNANCY RELATED CONDITION, ANTEPARTUM: ICD-10-CM

## 2019-10-30 DIAGNOSIS — Z82.79 FAMILY HISTORY OF CONGENITAL CARDIAC SEPTAL DEFECT: Primary | ICD-10-CM

## 2019-10-30 DIAGNOSIS — O28.3 ABNORMAL FETAL ULTRASOUND: Primary | ICD-10-CM

## 2019-10-30 PROCEDURE — 40000791 ZZHCL STATISTIC VERIFI PRENATAL TRISOMY 21,18,13: Performed by: OBSTETRICS & GYNECOLOGY

## 2019-10-30 PROCEDURE — 36415 COLL VENOUS BLD VENIPUNCTURE: CPT | Performed by: OBSTETRICS & GYNECOLOGY

## 2019-10-30 PROCEDURE — 76811 OB US DETAILED SNGL FETUS: CPT

## 2019-10-30 PROCEDURE — 40000072 ZZH STATISTIC GENETIC COUNSELING, < 16 MIN: Mod: ZF | Performed by: GENETIC COUNSELOR, MS

## 2019-10-30 NOTE — PROGRESS NOTES
[facility]  Genetic Counseling Consult    Patient:  Ofelia Will YOB: 1990   Date of Service:  10/30/19      Ofelia Will was seen at the [facility] comprehensive ultrasound.  I met with the patient following her ultrasound at the request of Dr. Cline to discuss today's ultrasound and options for screening. The patient was accompanied by her partner, Chris and daughter.       Impression/Plan:   1. Ofelia has not had serum screening in this pregnancy.     2. Ofelia had a comprehensive (level II) ultrasound today.  Please see the ultrasound report for further details.    3. The patient had a blood draw for NIPT (Innatal test through [employer]).  Results are expected within 7-10 days, and will be available in [employer].  We will contact her to discuss the results, and a copy will be forwarded to the office of the referring OB provider. Ofelia provided verbal permission for results to be left on her voicemail at [phone].     Pregnancy History:   /Parity:    Age at Delivery: 29 year old  ZHANG: 3/28/2020, by Last Menstrual Period  Gestational Age: 18w4d       Risk Assessment for Chromosome Conditions:   We explained that the risk for fetal chromosome abnormalities increases with maternal age. We discussed specific features of common chromosome abnormalities, including Down syndrome, trisomy 13, trisomy 18, and sex chromosome trisomies.      - At age 29 at midtrimester, the risk to have a baby with Down syndrome is 1 in 855.    - At age 29 at midtrimester, the risk to have a baby with any chromosome abnormality is 1 in 428.       Ofelia had maternal serum screening earlier in pregnancy.     We briefly reviewed the ultrasound finding of echogenic intracardiac focus, which refers to a small bright spot in the heart.  This finding is generally not associated with heart defects or other heart problems.   Sleep Medicine Follow Up    Fernando Cervantes is a 75 year old male patient with a history of OUMOU with severe PAP induced central apnea who presents as a follow up.    Today patient states he is overall about the same.  Patient was previously seen by me in the hospital, has a history of HFrEF (EF 28%).  He underwent split night study 5/2019 that showed severe obstructive sleep apnea but then also had severe PAP induced central apnea, no effective pressure seen.  He presented for a PAP titration 8/2019 that again showed severe PAP induced central apnea.  The speed at which patient was up titrated may have played a role.  No effective pressure was seen.  Patient was recommended to have repeat BPAP titration with backup rate.  Due to significant life stressors (loss of patients daughter) he requested not to return and would attempt empiric PAP therapy.  Unfortunately due to his heart failure and PAP induced central apnea there was no idea auto settings for patient.  He was trialed on BPAP 19/11 cmH2O with backup rate of 12.    Patient notes he has a very hard time tolerating PAP therapy.  He is using a full face make, initially had significant leak but has since fixed this.  On nights he uses PAP he does find he sleeps deeper, stays asleep most of the night, if he does wake he can easily get back to sleep.  However, he noticed in the morning he feels terrible.  He feels 'like I was beaten up, pressure seems so high'.  Because he feels so significantly worse during the day he cannot tolerate PAP usage.  He previously denied daytime symptoms but when using PAP he does feel so much worse during the day despite increased sleep time.    [place] Sleepiness Scale: 13  Prior [place] Sleepiness Scale: 5    Sleep Study Results:  Date: Split 5/25/2019  AHI: 41.8  RYAN: 1 then severe PAP induced central apnea  O2 Jorge: 83, (0% spent with <90% O2  Due to significant PAP induced central apnea, no effective pressure seen on this  Some studies have suggested that this ultrasound finding is thought to be seen with a higher frequency in pregnancies with a chromosome problem (particularly Down syndrome), as compared to pregnancies that do not have a chromosome problem. Because of this, it is thought that this ultrasound finding may increase the risk of a chromosome problem in a pregnancy.       Testing Options:   We discussed the following options:   Non-invasive Prenatal Testing (NIPT)    Maternal plasma cell-free DNA testing; first trimester ultrasound with nuchal translucency and nasal bone assessment is recommended, when appropriate    Screens for fetal trisomy 21, trisomy 13, trisomy 18, and sex chromosome aneuploidy    Cannot screen for open neural tube defects; maternal serum AFP after 15 weeks is recommended      We reviewed the benefits and limitations of this testing.  Screening tests provide a risk assessment specific to the pregnancy for certain fetal chromosome abnormalities, but cannot definitively diagnose or exclude a fetal chromosome abnormality.  Follow-up genetic counseling and consideration of diagnostic testing is recommended with any abnormal screening result.     Diagnostic tests carry inherent risks- including risk of miscarriage- that require careful consideration.  These tests can detect fetal chromosome abnormalities with greater than 99% certainty.  Results can be compromised by maternal cell contamination or mosaicism, and are limited by the resolution of cytogenetic G-banding technology.  There is no screening nor diagnostic test that can detect all forms of birth defects or mental disability.    It was a pleasure to be involved with [facility] care. Face-to-face time of the meeting was 15 minutes.    Marjorie Fernandes MS, [facility]  Maternal Fetal Medicine  [facility]  Ph: [phone]  [email or web address]    Patient seen, evaluated and discussed with the Genetic Counseling Intern. I have verified the content of  study.  Given improvement in oxygenation would recommend in lab BPAP titration, start at pressure of 10/6cmH2O.  Due to HFrEF patient is not a candidate for ASV.  Continue to follow with Cardiology.  Hypoxia appears improved on BPAP, in lab titration should ensure resolution of hypoxia.  Consider PFTs if not already obtained.  Weight loss recommended.    Date: Titration 8/6/2019  AHI: 45.7  RYAN: 39.8  O2 Jorge: 85  Due to significant PAP induced central apnea, no effective pressure seen on this study.  Obstructive events appeared mild on this study.  Due to HFrEF patient is not a candidate for ASV.  Given severity of central events, in lab BPAP titration recommended. start at pressure of 8/4cmH2O with backup rate of 6 breaths/min.  Continue to follow with Cardiology.  Weight loss recommended.    Adherence Data:  Date: 9/8-10/7/2019  Usage Days: 10%  Days >4 hours: 3%  Average AHI: 3.8  Average Hours Used: 3 hours 46 min  Average Leak: 2  Pressure: 19/70iqR2A, resp rate 12 bpm, triggering 68% of breaths    Sleep Altering Medication:  denies    Sleep Schedule:  Has problems staying asleep at night, usually will go to couch.  Related to stress/life events.    Sleep Log Review: NA    Social:  Tobacco last use: denies  Ethanol days per week/before bed: denies  Illicit Drugs: denies  Caffeine/ hour of last use: denies    No outpatient medications have been marked as taking for the 10/9/19 encounter (Hospital Encounter) with Raghu Fraser MD.       Social History     Tobacco Use   • Smoking status: Never Smoker   • Smokeless tobacco: Never Used   Substance Use Topics   • Alcohol use: No     Frequency: Never     Drinks per session: 1 or 2     Binge frequency: Never   • Drug use: No       ALLERGIES:   Allergen Reactions   • Amoxicillin DIZZINESS and ANAPHYLAXIS     passed out--shock--anaphylaxis    • Penicillins DIZZINESS and ANAPHYLAXIS     shock--passed out--anaphylaxsis   • Lisinopril Cough       Examination:  Visit  Vitals  BP 98/78   Pulse 56   Wt 110.2 kg   BMI 31.19 kg/m²     Estimated body mass index is 31.05 kg/m² as calculated from the following:    Height as of 9/19/19: 6' 2\" (1.88 m).    Weight as of 9/19/19: 109.7 kg.     GENERAL: In NAD, AAOx3  FACE and NECK: with moderate excessive soft tissue.   MOUTH: Dental occlusion normal. Tongue is enlarged for oral cavity with posterior mounding and appears obstructive.    Palate is enlarged without tonsillar hypertrophy. The palatal margin is not visible behind the tongue base and appears obstructive. NOSE: The patient breathes through his nose without alar collapse.   Mallampati: 4  HEART: normal, regular rate and rhythm  LUNGS: clear to auscultation  ABDOMEN:  Soft, non tender. Normoactive bowel sounds.  MSK: No skeletal deformities. Full ROM in upper and lower extremeties. No LE edema.  NEURO: CN II-XII grossly intact. Full strength in upper and lower extremities B/L.    Assessment and Plan:  Obstructive Sleep Apnea, Severe  - patient is not tolerating PAP therapy due to higher pressure  - likely may need need quite as high pressure to resolve obstructive events  - BPAP titration study ordered to start at lower pressure, use backup rate  - patient is very willing to be compliant with PAP therapy  - given patients anxiety, sleep medication given for sleep study x1    Severe PAP Induced Central Apnea  - BPAP titration study ordered, use backup rate, rate may need to be increased if central events persist    HFrEF  - EF 28%, not a candidate for ASV currently  - continue to follow with Cardiology/AHF  - discussed role of sleep disordered breathing in worsening CAD/HF/arrhythmia    Obesity Class I  - discussed role of weight loss in treatment of OUMOU    Raghu Fraser MD  Board Certified Sleep Medicine  Date of Service: 10/9/2019     the note, which accurately reflects my assessment of the patient and the plan of care.  Supervising Genetic Counselor  Angela Holley MS, [facility]  Licensed Genetic Counselor   [facility]  Maternal Fetal Medicine  [email or web address]  Office: [phone]  Pager [phone]  MFM: [phone]   Fax: [phone]

## 2019-10-30 NOTE — PROGRESS NOTES
"Please see \"Imaging\" tab under Chart Review for full details.    Jenny Cline MD  Maternal Fetal Medicine    "

## 2019-11-04 ENCOUNTER — TELEPHONE (OUTPATIENT)
Dept: MATERNAL FETAL MEDICINE | Facility: CLINIC | Age: 29
End: 2019-11-04

## 2019-11-04 DIAGNOSIS — Z34.80 PRENATAL CARE OF MULTIGRAVIDA, ANTEPARTUM: ICD-10-CM

## 2019-11-04 NOTE — TELEPHONE ENCOUNTER
November 4, 2019  I spoke with Ofelia regarding her NIPT results. Ofelia elected to pursue NIPT following identification of an EIF on ultrasound.     Results indicate NO ANEUPLOIDY DETECTED for chromosomes 21, 18, 13, or sex chromosomes (XX).     This puts her current pregnancy at low risk for Down syndrome, trisomy 18, trisomy 13 and sex chromosome abnormalities. This test is reported to have the following sensitivities: Down syndrome- 99%, trisomy 18- 98%, and trisomy 13- 98%. Although these results are reassuring, this does not replace a standard chromosome analysis from a chorionic villus sampling or amniocentesis.     Her results are available in her Epic chart for her primary OB to review.    Ofelia is scheduled to return for fetal echocardiogram 12/3/19 due to Ofelia's history of an ASD.      Marjorie Fernandes MS, [facility]  Genetic Counselor  Phone: [phone]  Pager: [phone]

## 2019-11-07 ENCOUNTER — PRENATAL OFFICE VISIT (OUTPATIENT)
Dept: OBGYN | Facility: CLINIC | Age: 29
End: 2019-11-07
Payer: COMMERCIAL

## 2019-11-07 VITALS — BODY MASS INDEX: 27.8 KG/M2 | DIASTOLIC BLOOD PRESSURE: 70 MMHG | SYSTOLIC BLOOD PRESSURE: 102 MMHG | WEIGHT: 152 LBS

## 2019-11-07 DIAGNOSIS — Z34.80 PRENATAL CARE OF MULTIGRAVIDA, ANTEPARTUM: ICD-10-CM

## 2019-11-07 LAB — LAB SCANNED RESULT: NORMAL

## 2019-11-07 PROCEDURE — 99207 ZZC PRENATAL VISIT: CPT | Performed by: OBSTETRICS & GYNECOLOGY

## 2019-11-07 NOTE — PROGRESS NOTES
PROBLEM LIST  LABS: Opos/RI GIRL (normal cell free dna)    1. Echogenic intracardiac focus: level II otherwise within normal limits, cell free dna testing negative.  2. Maternal history ASD repair: fetal echo planned.  3. CF carrier: declines carrier testing for FOB.    Doing well, discussed US results. Follow up in 4 weeks.    Kelsi Jane MD

## 2019-11-07 NOTE — NURSING NOTE
"Chief Complaint   Patient presents with     Prenatal Care       Initial /70   Wt 68.9 kg (152 lb)   LMP 2019   Breastfeeding? No   BMI 27.80 kg/m   Estimated body mass index is 27.8 kg/m  as calculated from the following:    Height as of 19: 1.575 m (5' 2\").    Weight as of this encounter: 68.9 kg (152 lb).  BP completed using cuff size: regular    Questioned patient about current smoking habits.  Pt. has never smoked.          19w5d  + FM noted  + savannah calvin ?  - cramping or bleeding  + heartburn  + nausea  - headache  Caitlin Johnson LPN               "

## 2019-12-03 ENCOUNTER — OFFICE VISIT (OUTPATIENT)
Dept: MATERNAL FETAL MEDICINE | Facility: CLINIC | Age: 29
End: 2019-12-03
Attending: OBSTETRICS & GYNECOLOGY
Payer: COMMERCIAL

## 2019-12-03 ENCOUNTER — HOSPITAL ENCOUNTER (OUTPATIENT)
Dept: ULTRASOUND IMAGING | Facility: CLINIC | Age: 29
Discharge: HOME | End: 2019-12-03
Attending: OBSTETRICS & GYNECOLOGY | Admitting: OBSTETRICS & GYNECOLOGY
Payer: COMMERCIAL

## 2019-12-03 DIAGNOSIS — Z82.79 FAMILY HISTORY OF CONGENITAL CARDIAC SEPTAL DEFECT: Primary | ICD-10-CM

## 2019-12-03 DIAGNOSIS — Z82.79 FAMILY HISTORY OF CONGENITAL CARDIAC SEPTAL DEFECT: ICD-10-CM

## 2019-12-03 PROCEDURE — 76825 ECHO EXAM OF FETAL HEART: CPT

## 2019-12-03 NOTE — PROGRESS NOTES
Please see ultrasound report under imaging tab for details on ultrasound performed today.    Allison Rivera MD  , OB/GYN  Maternal-Fetal Medicine  [email or web address]  [phone] (Academic office)  [phone] (Pager)

## 2019-12-12 ENCOUNTER — PRENATAL OFFICE VISIT (OUTPATIENT)
Dept: OBGYN | Facility: CLINIC | Age: 29
End: 2019-12-12
Payer: COMMERCIAL

## 2019-12-12 VITALS — DIASTOLIC BLOOD PRESSURE: 62 MMHG | SYSTOLIC BLOOD PRESSURE: 110 MMHG | WEIGHT: 157 LBS | BODY MASS INDEX: 28.72 KG/M2

## 2019-12-12 DIAGNOSIS — Z34.80 PRENATAL CARE OF MULTIGRAVIDA, ANTEPARTUM: ICD-10-CM

## 2019-12-12 PROCEDURE — 99207 ZZC PRENATAL VISIT: CPT | Performed by: OBSTETRICS & GYNECOLOGY

## 2019-12-12 PROCEDURE — 59425 ANTEPARTUM CARE ONLY: CPT | Performed by: OBSTETRICS & GYNECOLOGY

## 2019-12-12 NOTE — NURSING NOTE
"Chief Complaint   Patient presents with     Prenatal Care       Initial /62   Wt 71.2 kg (157 lb)   LMP 2019   Breastfeeding No   BMI 28.72 kg/m   Estimated body mass index is 28.72 kg/m  as calculated from the following:    Height as of 19: 1.575 m (5' 2\").    Weight as of this encounter: 71.2 kg (157 lb).  BP completed using cuff size: regular    Questioned patient about current smoking habits.  Pt. has never smoked.          24w5d  + FM daily  - cramping or bleeding  + heartburn  - headache  - nausea or vomiting  Caitlin Johnson LPN               "

## 2019-12-24 ENCOUNTER — ALLIED HEALTH/NURSE VISIT (OUTPATIENT)
Dept: NURSING | Facility: CLINIC | Age: 29
End: 2019-12-24
Payer: COMMERCIAL

## 2019-12-24 DIAGNOSIS — Z34.80 PRENATAL CARE OF MULTIGRAVIDA, ANTEPARTUM: ICD-10-CM

## 2019-12-24 DIAGNOSIS — Z34.00 SUPERVISION OF NORMAL FIRST PREGNANCY, ANTEPARTUM: Primary | ICD-10-CM

## 2019-12-24 LAB
ERYTHROCYTE [DISTWIDTH] IN BLOOD BY AUTOMATED COUNT: 12.2 % (ref 10–15)
HCT VFR BLD AUTO: 32.8 % (ref 35–47)
HGB BLD-MCNC: 10.9 G/DL (ref 11.7–15.7)
MCH RBC QN AUTO: 30.7 PG (ref 26.5–33)
MCHC RBC AUTO-ENTMCNC: 33.2 G/DL (ref 31.5–36.5)
MCV RBC AUTO: 92 FL (ref 78–100)
PLATELET # BLD AUTO: 193 10E9/L (ref 150–450)
RBC # BLD AUTO: 3.55 10E12/L (ref 3.8–5.2)
WBC # BLD AUTO: 7.6 10E9/L (ref 4–11)

## 2019-12-24 PROCEDURE — 99207 ZZC NO CHARGE NURSE ONLY: CPT

## 2019-12-24 PROCEDURE — 85027 COMPLETE CBC AUTOMATED: CPT | Performed by: OBSTETRICS & GYNECOLOGY

## 2019-12-24 PROCEDURE — 36415 COLL VENOUS BLD VENIPUNCTURE: CPT | Performed by: OBSTETRICS & GYNECOLOGY

## 2019-12-24 PROCEDURE — 82950 GLUCOSE TEST: CPT | Performed by: OBSTETRICS & GYNECOLOGY

## 2019-12-24 PROCEDURE — 86780 TREPONEMA PALLIDUM: CPT | Performed by: OBSTETRICS & GYNECOLOGY

## 2019-12-26 LAB — GLUCOSE 1H P 50 G GLC PO SERPL-MCNC: 85 MG/DL (ref 60–129)

## 2019-12-27 LAB — T PALLIDUM AB SER QL: NONREACTIVE

## 2020-01-03 ENCOUNTER — PRENATAL OFFICE VISIT (OUTPATIENT)
Dept: OBGYN | Facility: CLINIC | Age: 30
End: 2020-01-03

## 2020-01-03 VITALS — WEIGHT: 160 LBS | SYSTOLIC BLOOD PRESSURE: 108 MMHG | DIASTOLIC BLOOD PRESSURE: 60 MMHG | BODY MASS INDEX: 29.26 KG/M2

## 2020-01-03 DIAGNOSIS — Z34.80 PRENATAL CARE OF MULTIGRAVIDA, ANTEPARTUM: ICD-10-CM

## 2020-01-03 PROCEDURE — 99207 ZZC PRENATAL VISIT: CPT | Performed by: OBSTETRICS & GYNECOLOGY

## 2020-01-03 NOTE — PROGRESS NOTES
PROBLEM LIST  LABS: Opos/RI GIRL (normal cell free dna)    1. Echogenic intracardiac focus: level II otherwise within normal limits, cell free dna testing negative.  2. Maternal history ASD repair: fetal echo planned.  3. CF carrier: declines carrier testing for FOB.    No concerns, doing well. Follow up in 2 weeks.    Kelsi Jane MD

## 2020-01-03 NOTE — NURSING NOTE
"Chief Complaint   Patient presents with     Prenatal Care       Initial /60   Wt 72.6 kg (160 lb)   LMP 2019   Breastfeeding No   BMI 29.26 kg/m   Estimated body mass index is 29.26 kg/m  as calculated from the following:    Height as of 19: 1.575 m (5' 2\").    Weight as of this encounter: 72.6 kg (160 lb).  BP completed using cuff size: regular    Questioned patient about current smoking habits.  Pt. has never smoked.          27w6d  + FM daily  + hip and sciatic nerve pain  + heartburn  + occas. Headache  - bleeding or LOF  Caitlin Johnson LPN             "

## 2020-01-20 ENCOUNTER — PRENATAL OFFICE VISIT (OUTPATIENT)
Dept: OBGYN | Facility: CLINIC | Age: 30
End: 2020-01-20
Payer: MEDICAID

## 2020-01-20 VITALS — DIASTOLIC BLOOD PRESSURE: 62 MMHG | SYSTOLIC BLOOD PRESSURE: 108 MMHG | WEIGHT: 161 LBS | BODY MASS INDEX: 29.45 KG/M2

## 2020-01-20 DIAGNOSIS — Z34.83 PRENATAL CARE, SUBSEQUENT PREGNANCY IN THIRD TRIMESTER: Primary | ICD-10-CM

## 2020-01-20 PROCEDURE — 90471 IMMUNIZATION ADMIN: CPT | Performed by: OBSTETRICS & GYNECOLOGY

## 2020-01-20 PROCEDURE — 90715 TDAP VACCINE 7 YRS/> IM: CPT | Performed by: OBSTETRICS & GYNECOLOGY

## 2020-01-20 PROCEDURE — 99207 ZZC PRENATAL VISIT: CPT | Performed by: OBSTETRICS & GYNECOLOGY

## 2020-01-20 NOTE — NURSING NOTE
"Chief Complaint   Patient presents with     Prenatal Care     30 weeks 2 days- no concerns        Initial /62 (BP Location: Right arm, Patient Position: Sitting, Cuff Size: Adult Regular)   Wt 73 kg (161 lb)   LMP 2019   BMI 29.45 kg/m   Estimated body mass index is 29.45 kg/m  as calculated from the following:    Height as of 19: 1.575 m (5' 2\").    Weight as of this encounter: 73 kg (161 lb).  BP completed using cuff size: regular    Questioned patient about current smoking habits.  Pt. has never smoked.          The following HM Due: NONE    30w2d  Ferny Schaeffer CMA                "

## 2020-01-20 NOTE — PROGRESS NOTES
No c/o's.  TDaP today.  Fetal movement counts BID,  labor/premature rupture of membranes precautions reviewed.  RTC 2 week(s).    Encounter Diagnosis   Name Primary?     Prenatal care, subsequent pregnancy in third trimester Yes       Risk factors listed above are stable and being addressed as noted.    Patel Ladd MD  [facility]

## 2020-01-31 ENCOUNTER — PATIENT OUTREACH (OUTPATIENT)
Dept: CARE COORDINATION | Facility: CLINIC | Age: 30
End: 2020-01-31

## 2020-01-31 ENCOUNTER — PRENATAL OFFICE VISIT (OUTPATIENT)
Dept: OBGYN | Facility: CLINIC | Age: 30
End: 2020-01-31
Payer: MEDICAID

## 2020-01-31 VITALS — SYSTOLIC BLOOD PRESSURE: 112 MMHG | BODY MASS INDEX: 30 KG/M2 | DIASTOLIC BLOOD PRESSURE: 66 MMHG | WEIGHT: 164 LBS

## 2020-01-31 DIAGNOSIS — Z34.80 PRENATAL CARE OF MULTIGRAVIDA, ANTEPARTUM: ICD-10-CM

## 2020-01-31 PROCEDURE — 99207 ZZC PRENATAL VISIT: CPT | Performed by: OBSTETRICS & GYNECOLOGY

## 2020-01-31 ASSESSMENT — ACTIVITIES OF DAILY LIVING (ADL): DEPENDENT_IADLS:: INDEPENDENT

## 2020-01-31 NOTE — PROGRESS NOTES
Clinic Care Coordination Contact  OUTREACH    Referral Information:  Referral Source: PCP    Primary Diagnosis: Pregnancy    Chief Complaint   Patient presents with     Clinic Care Coordination - Face To Face     overwhelmed     Clinic Care Coordination - Initial        Universal Utilization: Patient here in clinic for follow up visit with Primary Care Provider.   Clinic Utilization  Difficulty keeping appointments:: No  Compliance Concerns: No  No-Show Concerns: No  No PCP office visit in Past Year: No  Utilization    Last refreshed: 1/31/2020 12:21 PM:  Hospital Admissions 0           Last refreshed: 1/31/2020 12:21 PM:  ED Visits 0           Last refreshed: 1/31/2020 12:21 PM:  No Show Count (past year) 0              Current as of: 1/31/2020 12:21 PM            Clinical Concerns:  Current Medical Concerns:    Patient Active Problem List   Diagnosis     H/O atrial septal defect repair     Cystic fibrosis carrier     Patient [facility]     Prenatal care of multigravida, antepartum     Patient is 32 weeks pregnant.  is a . Away for a couple more weeks. Patient is feeling overwhelmed with caring for daughter while being pregnant. Patient reports she is crying everyday and this is not normal for her. Has seen therapist in the past but this was a year ago. Unsure if she takes her new insurance. Alma Bharti ins reproductive mental health #[phone]. Patient called and left message for Alma requesting a call back to schedule an appointment.     Current Behavioral Concerns: none noted.    Education Provided to patient: CC role.   Pain  Pain (GOAL):: No  Health Maintenance Reviewed: Due/Overdue   Health Maintenance Topics with due status: Overdue       Topic Date Due    MATERNAL SCREENING 10/05/2019    PREVENTIVE CARE VISIT 11/01/2019    PHQ-2 01/01/2020    REPEAT ANTIBODY SCREEN (OB) 01/04/2020    RH IMMUNE GLOBULIN (OB) 01/04/2020        Clinical Pathway:  None    Medication Management:  Patient independently manages own medications. Reports taking as prescribed. No concerns.      Functional Status:  Dependent ADLs:: Independent  Dependent IADLs:: Independent  Bed or wheelchair confined:: No  Mobility Status: Independent  Fallen 2 or more times in the past year?: No  Any fall with injury in the past year?: No    Living Situation:  Current living arrangement:: I live in a private home with family  Type of residence:: Private home - stairs    Lifestyle & Psychosocial Needs:        Diet:: Regular  Inadequate nutrition (GOAL):: No  Tube Feeding: No  Inadequate activity/exercise (GOAL):: No  Significant changes in sleep pattern (GOAL): No  Regular car     Lutheran or spiritual beliefs that impact treatment:: No  Mental health DX:: No  Mental health management concern (GOAL):: Yes  Informal Support system:: Spouse, Family, Friends   Socioeconomic History     Marital status:      Spouse name: Chris     Number of children: Not on file     Years of education: Not on file     Highest education level: Not on file   Occupational History     Occupation: Teller     Tobacco Use     Smoking status: Never Smoker     Smokeless tobacco: Never Used   Substance and Sexual Activity     Alcohol use: Not Currently     Drug use: No     Sexual activity: Yes     Partners: Male      Resources and Interventions:  Current Resources:   Community Resources: None  Supplies used at home:: None  Equipment Currently Used at Home: none    Advance Care Plan/Directive  Advanced Care Plans/Directives on file:: No  Advanced Care Plan/Directive Status: Declined Further Information    Referrals Placed: None     Goals:   Goals        General    1. Mental Health Management (pt-stated)     Notes - Note edited  1/31/2020  1:47 PM by Ofelia Echevarria RN    Goal Statement: I would like better management of feeling overwhelmed.   Date Goal set: 01/31/2020  Date to Achieve By: 06/2020  Patient expressed  understanding of goal: Yes  Action steps to achieve this goal:  1. I will call Alma at [facility] mental [facility] at [phone] to determine if she accepts my new insurance and schedule an appointment.   2. I will follow up with providers as recommended.   3. I will call CCRN with questions, concerns, support needs. CCRN will be available as needed.     As of today's date 1/31/2020 goal is met at 26 - 50%.   Goal Status:  Showing progress. Patient called and left message for Alma.               Barriers: none noted.   Strengths: mom is coming to stay with her will arrive tomorrow until  returns home. Engaged in care coordination.     Patient/Caregiver understanding: Patient verbalized understanding and denies any additional questions or concerns at this time. RNCC engaged in [employer] communications during encounter.       Outreach Frequency: 2 weeks  Future Appointments              In 2 weeks Kelsi Jane MD [facility]    In 2 weeks Kelsi Jane MD [place]    In 3 weeks Kelsi Jane MD [place]    In 1 month Kelsi Jane MD [place]    In 1 month Kelsi Jane MD [place]    In 1 month Kelsi Jane MD [place]          Plan: RNCC to mail introduction letter, complex care plan  to patient.  Patient was provided with writers contact information and encouraged to call with questions, concerns, support needs. RNCC will remain available as needed. RNCC will follow up with patient again in 2-3 weeks.     Ofelia Echevarria RN Care Coordinator  [facility]  Email: [email or web address]  Phone: [phone]

## 2020-01-31 NOTE — PROGRESS NOTES
PROBLEM LIST  LABS: Opos/RI GIRL (normal cell free dna)    1. Echogenic intracardiac focus: level II otherwise within normal limits, cell free dna testing negative.  2. Maternal history ASD repair: fetal echo planned.  3. CF carrier: declines carrier testing for FOB.    No concerns about the fetus, good fetal movement.   Her  is gone for an extended period of time for work ([employer]). She is struggling with anxiety and depression and with daily activities of caring for a toddler alone while pregnant. This is worse with sciatica, which limits her activities a bit. Chiropractic care has helped but it always comes back. Her mother is flying in this weekend to help, but she has no consistent family help around.    She did see a counselor for anxiety after her first child delivered. She is open to seeing someone now. SW will see her prior to leaving, and we will come up with a treatment plan for her. I will see her back in 2 weeks, sooner if needed.  Follow up in 2 weeks.    Kelsi Jane MD

## 2020-01-31 NOTE — LETTER
[facility]  Complex Care Plan  About Me:    Patient Name:  Ofelia Will    YOB: 1990  Age:         29 year old   [place] MRN:    [id number] Telephone Information:  Home Phone [phone]   Mobile [phone]       Address:  [address] [facility] [address] Email address:  [email or web address]      Emergency Contact(s)    Name Relationship Lgl Grd Work Phone Home Phone Mobile Phone   1KAVITHA SMITH Spouse No  [phone]   2. NSC Other   [phone]            Primary language:  English     needed? No   [place] [employer] [facility]:  [phone] op. 1  Other communication barriers: None  Preferred Method of Communication:  Mail  Current living arrangement: I live in a private home with family  Mobility Status/ Medical Equipment: Independent    Health Maintenance  Health Maintenance Reviewed: Due/Overdue   Health Maintenance Topics with due status: Overdue       Topic Date Due    MATERNAL SCREENING 10/05/2019    PREVENTIVE CARE VISIT 11/01/2019    PHQ-2 01/01/2020    REPEAT ANTIBODY SCREEN (OB) 01/04/2020    RH IMMUNE GLOBULIN (OB) 01/04/2020     My Access Plan  Medical Emergency 911   Primary Clinic Line [facility] [phone]   24 Hour Appointment Line [phone] or  [phone]) (toll-free)   24 Hour Nurse Line [phone] (toll-free)   Preferred Urgent Care ([facility] [place])   Preferred Hospital [facility]  [phone]   Preferred Pharmacy [id number] [place], [place] - [address] AT [address]     Behavioral Health Crisis Line The National Suicide Prevention Lifeline at 1-958.499.1652 or 911     My Care Team Members  Patient Care Team       Relationship Specialty Notifications Start End    Kelsi aJne MD PCP - General OB/Gyn  11/1/18     Phone: [phone] Fax: [phone]         [address]  [facility] [address]    Sacha Fernandez MD MD Pediatrics  4/3/18     [facility]    Phone: [phone] Fax: [phone] [address]  [facility] [address]    Jacquie Campbell MD Assigned PCP   9/15/19     Phone: [phone] Fax: [phone]         [address] [facility] [address]    Ofelia Echevarria, RN Lead Care Coordinator   1/31/20     Phone: [phone]                 My Care Plans  Self Management and Treatment Plan  Goals and (Comments)  Goals        General    1. Mental Health Management (pt-stated)     Notes - Note edited  1/31/2020  1:47 PM by Ofelia Echevarria, RN    Goal Statement: I would like better management of feeling overwhelmed.   Date Goal set: 01/31/2020  Date to Achieve By: 06/2020  Patient expressed understanding of goal: Yes  Action steps to achieve this goal:  1. I will call Alma at [facility][1] at [phone] to determine if she accepts my new insurance and schedule an appointment.   2. I will follow up with providers as recommended.   3. I will call CCRN with questions, concerns, support needs. CCRN will be available as needed.     As of today's date 1/31/2020 goal is met at 26 - 50%.   Goal Status:  Showing progress. Patient called and left message for Alma.                  Action Plans on File:                       Advance Care Plans/Directives Type:        My Medical and Care Information  Problem List   Patient Active Problem List   Diagnosis     H/O atrial septal defect repair     Cystic fibrosis carrier     Patient is followed by the [facility]     Prenatal care of multigravida, antepartum      Current Medications and Allergies:  See printed Medication Report.    Care Coordination Start Date: 1/31/2020   Frequency of Care Coordination: 2 weeks   Form Last Updated: 01/31/2020

## 2020-01-31 NOTE — NURSING NOTE
"Chief Complaint   Patient presents with     Prenatal Care       Initial /66   Wt 74.4 kg (164 lb)   LMP 2019   Breastfeeding No   BMI 30.00 kg/m   Estimated body mass index is 30 kg/m  as calculated from the following:    Height as of 19: 1.575 m (5' 2\").    Weight as of this encounter: 74.4 kg (164 lb).  BP completed using cuff size: regular    Questioned patient about current smoking habits.  Pt. has never smoked.          + FM daily  + headache occas.   + heartburn  - edema  - cramping or bleeding  + savannah calvin contractions  Caitlin Johnson LPN                 "

## 2020-01-31 NOTE — LETTER
[place] CARE COORDINATION  [address]  [facility] [address]     January 31, 2020    Ofelia Johnsonmeagan Will  [address] [facility] [address]      Dear Ofelia,    I am a clinic care coordinator who works with Kelsi Jane MD at  [facility]. I wanted to thank you for spending the time to talk with me.  I wanted to introduce myself and provide you with my contact information so that you can call me with questions or concerns about your health care. Below is a description of clinic care coordination and how I can further assist you.     The clinic care coordinator is a registered nurse and/or  who understand the health care system. The goal of clinic care coordination is to help you manage your health and improve access to the [facility] system in the most efficient manner. The registered nurse can assist you in meeting your health care goals by providing education, coordinating services, and strengthening the communication among your providers. The  can assist you with financial, behavioral, psychosocial, chemical dependency, counseling, and/or psychiatric resources.    Please feel free to contact me at [phone], with any questions or concerns. We at [facility] are focused on providing you with the highest-quality healthcare experience possible and that all starts with you.     Sincerely,     Ofelia Echevarria RN    Enclosed: I have enclosed a copy of the Complex Care Plan. This has helpful information and goals that we have talked about. Please keep this in an easy to access place to use as needed.

## 2020-02-14 ENCOUNTER — PRENATAL OFFICE VISIT (OUTPATIENT)
Dept: OBGYN | Facility: CLINIC | Age: 30
End: 2020-02-14
Payer: MEDICAID

## 2020-02-14 VITALS — WEIGHT: 165 LBS | BODY MASS INDEX: 30.18 KG/M2 | SYSTOLIC BLOOD PRESSURE: 114 MMHG | DIASTOLIC BLOOD PRESSURE: 70 MMHG

## 2020-02-14 DIAGNOSIS — Z34.80 PRENATAL CARE OF MULTIGRAVIDA, ANTEPARTUM: ICD-10-CM

## 2020-02-14 PROCEDURE — 99207 ZZC PRENATAL VISIT: CPT | Performed by: OBSTETRICS & GYNECOLOGY

## 2020-02-14 NOTE — PROGRESS NOTES
PROBLEM LIST  LABS: Opos/RI GIRL (normal cell free dna)    1. Echogenic intracardiac focus: level II otherwise within normal limits, cell free dna testing negative.  2. Maternal history ASD repair: fetal echo planned.  3. CF carrier: declines carrier testing for FOB.    No concerns about the fetus, good fetal movement.    is home starting today, and her mother has been helping too.  Follow up in 2 weeks.    Kelsi Jane MD

## 2020-02-14 NOTE — NURSING NOTE
"Chief Complaint   Patient presents with     Prenatal Care       Initial /70   Wt 74.8 kg (165 lb)   LMP 2019   Breastfeeding No   BMI 30.18 kg/m   Estimated body mass index is 30.18 kg/m  as calculated from the following:    Height as of 19: 1.575 m (5' 2\").    Weight as of this encounter: 74.8 kg (165 lb).  BP completed using cuff size: regular    Questioned patient about current smoking habits.  Pt. has never smoked.          33w6d  + FM daily  + savannah calvin contractions  + mild cramping  - bleeding or leaking of fluid  + heartburn   - headache  Caitlin Johnson LPN                "

## 2020-02-17 ENCOUNTER — PATIENT OUTREACH (OUTPATIENT)
Dept: CARE COORDINATION | Facility: CLINIC | Age: 30
End: 2020-02-17

## 2020-02-17 NOTE — PROGRESS NOTES
Clinic Care Coordination Contact    Follow Up Progress Note      Assessment: Patient reports her  is back. [place] back from therapist Alma, met with her a couple times. Everything set in place, doing well. Goal completed. Discussed transitioning to maintenance and following up in two months but patient reports she wont be in state anymore. Moving in April. Due date end of March. Reassures writer she is doing fine and doesn't need any additional follow up.     Goals addressed this encounter:   Goals Addressed                 This Visit's Progress       Patient Stated      COMPLETED: 1. Mental Health Management (pt-stated)        Goal Statement: I would like better management of feeling overwhelmed.   Date Goal set: 01/31/2020  Date to Achieve By: 06/2020  Patient expressed understanding of goal: Yes  Action steps to achieve this goal:  1. I will call Alma at [facility] at [phone] to determine if she accepts my new insurance and schedule an appointment.   2. I will follow up with providers as recommended.   3. I will call CCRN with questions, concerns, support needs. CCRN will be available as needed.     As of today's date 1/31/2020 goal is met at 26 - 50%.   Goal Status:  Showing progress. Patient called and left message for Alma.   As of today's date 2/17/2020 goal is met at 76 - 100%.   Goal Status:  Complete                 Intervention/Education provided during outreach: CC role, goals reviewed.     Plan:   Patient will call CCRN with questions, concerns, support needs. CCRN will be available as needed.    Patient declined any additional follow up. As above.     Ofelia Echevarria RN Care Coordinator  [facility]  Email: [email or web address]  Phone: [phone]

## 2020-02-27 ENCOUNTER — PRENATAL OFFICE VISIT (OUTPATIENT)
Dept: OBGYN | Facility: CLINIC | Age: 30
End: 2020-02-27
Payer: MEDICAID

## 2020-02-27 VITALS — WEIGHT: 166 LBS | BODY MASS INDEX: 30.36 KG/M2 | DIASTOLIC BLOOD PRESSURE: 62 MMHG | SYSTOLIC BLOOD PRESSURE: 122 MMHG

## 2020-02-27 DIAGNOSIS — Z34.83 PRENATAL CARE, SUBSEQUENT PREGNANCY IN THIRD TRIMESTER: Primary | ICD-10-CM

## 2020-02-27 PROCEDURE — 99207 ZZC PRENATAL VISIT: CPT | Performed by: OBSTETRICS & GYNECOLOGY

## 2020-02-27 PROCEDURE — 59425 ANTEPARTUM CARE ONLY: CPT | Performed by: OBSTETRICS & GYNECOLOGY

## 2020-02-27 NOTE — NURSING NOTE
"Chief Complaint   Patient presents with     Prenatal Care     35 5/7 weeks       Initial /62   Wt 75.3 kg (166 lb)   LMP 2019   BMI 30.36 kg/m   Estimated body mass index is 30.36 kg/m  as calculated from the following:    Height as of 19: 1.575 m (5' 2\").    Weight as of this encounter: 75.3 kg (166 lb).  BP completed using cuff size: regular    Questioned patient about current smoking habits.  Pt. has never smoked.          The following HM Due: NONE  +fetal movement  -swelling    Tracee Barron, CMA    "

## 2020-02-27 NOTE — PROGRESS NOTES
PROBLEM LIST  LABS: Opos/RI GIRL (normal cell free dna)    1. Echogenic intracardiac focus: level II otherwise within normal limits, cell free dna testing negative.  2. Maternal history ASD repair: fetal echo planned.  3. CF carrier: declines carrier testing for FOB.    No concerns about the fetus, good fetal movement. Discussed kick counts.  Group B Strep next visit. Follow up weekly.    Kelsi Jane MD

## 2020-03-05 ENCOUNTER — NURSE TRIAGE (OUTPATIENT)
Dept: NURSING | Facility: CLINIC | Age: 30
End: 2020-03-05

## 2020-03-05 ENCOUNTER — PRENATAL OFFICE VISIT (OUTPATIENT)
Dept: OBGYN | Facility: CLINIC | Age: 30
End: 2020-03-05
Payer: COMMERCIAL

## 2020-03-05 VITALS — BODY MASS INDEX: 30.54 KG/M2 | WEIGHT: 167 LBS | DIASTOLIC BLOOD PRESSURE: 64 MMHG | SYSTOLIC BLOOD PRESSURE: 122 MMHG

## 2020-03-05 DIAGNOSIS — Z34.80 PRENATAL CARE OF MULTIGRAVIDA, ANTEPARTUM: ICD-10-CM

## 2020-03-05 PROCEDURE — 99212 OFFICE O/P EST SF 10 MIN: CPT | Performed by: OBSTETRICS & GYNECOLOGY

## 2020-03-05 PROCEDURE — 87653 STREP B DNA AMP PROBE: CPT | Performed by: OBSTETRICS & GYNECOLOGY

## 2020-03-05 PROCEDURE — 87186 SC STD MICRODIL/AGAR DIL: CPT | Performed by: OBSTETRICS & GYNECOLOGY

## 2020-03-05 NOTE — NURSING NOTE
"Chief Complaint   Patient presents with     Prenatal Care       Initial /64   Wt 75.8 kg (167 lb)   LMP 2019   Breastfeeding No   BMI 30.54 kg/m   Estimated body mass index is 30.54 kg/m  as calculated from the following:    Height as of 19: 1.575 m (5' 2\").    Weight as of this encounter: 75.8 kg (167 lb).  BP completed using cuff size: small regular    Questioned patient about current smoking habits.  Pt. has never smoked.          36w5d  + FM daily  - cramping or bleeding  + contractions  - headache  + heartburn  - edema  Caitlin Johnson LPN               "

## 2020-03-05 NOTE — PROGRESS NOTES
PROBLEM LIST  LABS: Opos/RI GIRL (normal cell free dna)    1. Echogenic intracardiac focus: level II otherwise within normal limits, cell free dna testing negative.  2. Maternal history ASD repair: fetal echo planned.  3. CF carrier: declines carrier testing for FOB.    No concerns about the fetus, good fetal movement. Discussed kick counts.  Group B Strep today. Could not palpate a presenting part, so bedside US confirmed cephalic presentation.  Follow up weekly.  Signs and symptoms of labor reviewed, including when to call or come in for evaluation.     Kelsi Jane MD

## 2020-03-06 LAB
GP B STREP DNA SPEC QL NAA+PROBE: POSITIVE
SPECIMEN SOURCE: ABNORMAL

## 2020-03-06 NOTE — TELEPHONE ENCOUNTER
"Patient is pregnant at 36w5d.  Patient reports pain all across of chest - constant, pain level 5-6/10, for the last 10 min.  Patient says it's not heartburn.  Patient says fetal movement is good.  Patient thinks the baby is causing the pain.  FNA advised try to lay on her left side or lean over a ball.  FNA advised to call back with any concerned.  Caller verbalizes understanding.        Reason for Disposition    Abdominal pain is a chronic symptom (recurrent or ongoing AND present > 4 weeks)    Additional Information    Negative: Passed out (i.e., lost consciousness, collapsed and was not responding)    Negative: Shock suspected (e.g., cold/pale/clammy skin, too weak to stand, low BP, rapid pulse)    Negative: Difficult to awaken or acting confused (e.g., disoriented, slurred speech)    Negative: [1] SEVERE abdominal pain (e.g., excruciating) AND [2] constant AND [3] present > 1 hour    Negative: SEVERE vaginal bleeding (e.g., continuous red blood from vagina, or large blood clots)    Negative: Sounds like a life-threatening emergency to the triager    Negative: [1] Vomiting AND [2] contains red blood or black (\"coffee ground\") material  (Exception: few red streaks in vomit that only happened once)    Negative: Followed an abdomen (stomach) injury    Negative: [1] Having contractions or other symptoms of labor (such as vaginal pressure) AND [2] >= 37 weeks pregnant (i.e., term pregnancy)    Negative: [1] Having contractions or other symptoms of labor (such as vaginal pressure) AND [2] < 37 weeks pregnant (i.e., )    Negative: [1] Abdominal pain AND [2] pregnant < 20 weeks    Negative: MODERATE-SEVERE abdominal pain (e.g., interferes with normal activities, awakens from sleep)    Negative: Vaginal bleeding or spotting    Negative: [1] Baby moving less today (e.g., kick count < 5 in 1 hour or < 10 in 2 hours) AND [2] pregnant 23 or more weeks    Negative: Leakage of fluid from vagina    Negative: New hand or " face swelling    Negative: New blurred vision or vision changes    Negative: [1] SEVERE headache AND [2] not relieved with acetaminophen (e.g., Tylenol)    Negative: [1] MILD abdominal pain (e.g., doesn't interfere with normal activities) AND [2] constant AND [3] present > 2 hours    Negative: [1] Intermittent lower abdominal pain AND [2] present > 24 hours    Negative: Fever > 100.4 F (38.0 C)    Negative: Blood in urine (red, pink, or tea-colored)    Negative: White of the eyes have turned yellow (i.e., jaundice)    Negative: Patient sounds very sick or weak to the triager    Negative: Discomfort when passing urine (e.g., pain, burning or stinging)    Negative: Unusual vaginal discharge (e.g., bad smelling, yellow, green, or foamy-white)    Negative: [1] Upper abdominal pains AND [2] radiate into chest, with sour taste in mouth    Negative: [1] Upper abdominal pains AND [2] occur regularly about 1 hour after meals    Protocols used: PREGNANCY - ABDOMINAL PAIN GREATER THAN 20 WEEKS EGA-A-

## 2020-03-10 ENCOUNTER — HEALTH MAINTENANCE LETTER (OUTPATIENT)
Age: 30
End: 2020-03-10

## 2020-03-10 LAB
BACTERIA SPEC CULT: ABNORMAL
SPECIMEN SOURCE: ABNORMAL

## 2020-03-12 ENCOUNTER — PRENATAL OFFICE VISIT (OUTPATIENT)
Dept: OBGYN | Facility: CLINIC | Age: 30
End: 2020-03-12
Payer: COMMERCIAL

## 2020-03-12 VITALS — DIASTOLIC BLOOD PRESSURE: 70 MMHG | BODY MASS INDEX: 30.73 KG/M2 | WEIGHT: 168 LBS | SYSTOLIC BLOOD PRESSURE: 118 MMHG

## 2020-03-12 DIAGNOSIS — Z34.80 PRENATAL CARE OF MULTIGRAVIDA, ANTEPARTUM: ICD-10-CM

## 2020-03-12 PROCEDURE — 99212 OFFICE O/P EST SF 10 MIN: CPT | Performed by: OBSTETRICS & GYNECOLOGY

## 2020-03-12 NOTE — NURSING NOTE
"Chief Complaint   Patient presents with     Prenatal Care     37 weeks 5 days, no c/o vaginal bleeding, patient has had contractions and cramping. Feeling fetal movement        Initial /70 (BP Location: Right arm, Patient Position: Chair, Cuff Size: Adult Regular)   Wt 76.2 kg (168 lb)   LMP 2019   BMI 30.73 kg/m   Estimated body mass index is 30.73 kg/m  as calculated from the following:    Height as of 19: 1.575 m (5' 2\").    Weight as of this encounter: 76.2 kg (168 lb).  BP completed using cuff size: regular    Questioned patient about current smoking habits.  Pt. has never smoked.          The following HM Due: NONE      Ann Richard CMA               "

## 2020-03-12 NOTE — PROGRESS NOTES
PROBLEM LIST  LABS: Opos/RI GIRL (normal cell free dna)    1. Echogenic intracardiac focus: level II otherwise within normal limits, cell free dna testing negative.  2. Maternal history ASD repair: fetal echo planned.  3. CF carrier: declines carrier testing for FOB.    No concerns about the fetus, good fetal movement. Discussed kick counts.  Concerned about her mood--history of anxiety, was worse postpartum. No treatment at that time. She is seeing a therapist right now, wondering if she needs meds in addition. We discussed risks, benefits, and alternatives to this today, and her partner was present for that discussion as well. Plan would be to start a low dose of Celexa if she decides to do that now. Also discussed could start it immediately postpartum or at a short term 2 week postpartum exam if needed. Takes time ramp up, discussed. She will think things over and let me know if she wishes to try prior to her next appointment.    Signs and symptoms of labor reviewed, including when to call or come in for evaluation.     Kelsi Jane MD

## 2020-03-19 ENCOUNTER — NURSE TRIAGE (OUTPATIENT)
Dept: NURSING | Facility: CLINIC | Age: 30
End: 2020-03-19

## 2020-03-19 ENCOUNTER — HOSPITAL ENCOUNTER (OUTPATIENT)
Facility: CLINIC | Age: 30
Discharge: HOME | End: 2020-03-19
Attending: OBSTETRICS & GYNECOLOGY | Admitting: OBSTETRICS & GYNECOLOGY
Payer: COMMERCIAL

## 2020-03-19 ENCOUNTER — PRENATAL OFFICE VISIT (OUTPATIENT)
Dept: OBGYN | Facility: CLINIC | Age: 30
End: 2020-03-19
Payer: COMMERCIAL

## 2020-03-19 VITALS
SYSTOLIC BLOOD PRESSURE: 125 MMHG | BODY MASS INDEX: 29.84 KG/M2 | RESPIRATION RATE: 16 BRPM | HEIGHT: 63 IN | WEIGHT: 168.4 LBS | DIASTOLIC BLOOD PRESSURE: 81 MMHG | TEMPERATURE: 98.7 F

## 2020-03-19 VITALS — DIASTOLIC BLOOD PRESSURE: 76 MMHG | WEIGHT: 168.4 LBS | SYSTOLIC BLOOD PRESSURE: 118 MMHG | BODY MASS INDEX: 30.8 KG/M2

## 2020-03-19 DIAGNOSIS — Z34.80 PRENATAL CARE OF MULTIGRAVIDA, ANTEPARTUM: Primary | ICD-10-CM

## 2020-03-19 DIAGNOSIS — O47.9 FALSE LABOR: Primary | ICD-10-CM

## 2020-03-19 PROBLEM — Z36.89 ENCOUNTER FOR TRIAGE IN PREGNANT PATIENT: Status: ACTIVE | Noted: 2020-03-19

## 2020-03-19 PROCEDURE — G0463 HOSPITAL OUTPT CLINIC VISIT: HCPCS | Mod: 25

## 2020-03-19 PROCEDURE — 99212 OFFICE O/P EST SF 10 MIN: CPT | Performed by: OBSTETRICS & GYNECOLOGY

## 2020-03-19 PROCEDURE — 59025 FETAL NON-STRESS TEST: CPT

## 2020-03-19 RX ORDER — HYDROXYZINE PAMOATE 50 MG/1
50-100 CAPSULE ORAL EVERY 6 HOURS PRN
Qty: 30 CAPSULE | Refills: 0 | Status: SHIPPED | OUTPATIENT
Start: 2020-03-19

## 2020-03-19 ASSESSMENT — ACTIVITIES OF DAILY LIVING (ADL)
RETIRED_COMMUNICATION: 0-->UNDERSTANDS/COMMUNICATES WITHOUT DIFFICULTY
FALL_HISTORY_WITHIN_LAST_SIX_MONTHS: NO
RETIRED_EATING: 0-->INDEPENDENT
SWALLOWING: 0-->SWALLOWS FOODS/LIQUIDS WITHOUT DIFFICULTY
DRESS: 0-->INDEPENDENT
TRANSFERRING: 0-->INDEPENDENT
BATHING: 0-->INDEPENDENT
COGNITION: 0 - NO COGNITION ISSUES REPORTED
TOILETING: 0-->INDEPENDENT
AMBULATION: 0-->INDEPENDENT

## 2020-03-19 ASSESSMENT — MIFFLIN-ST. JEOR: SCORE: 1455.05

## 2020-03-19 NOTE — PROVIDER NOTIFICATION
03/19/20 7328   Provider Notification   Provider Name/Title Dr. Santana   Method of Notification Phone   Request Evaluate - Remote   Notification Reason Patient Arrived;Uterine Activity;SVE;Status Update   MD updated on patient arrival, worsening contractions since 1400, ivett every 2-4 mins, talking through some and states others are starting to take her breath away, FHT's category one, SVE 3-4/30/-3 (documented 2/50-60/-1 in clinic today). Patient is anxious and hoping to stay. GBS positive. Of note possible extremity felt prior to feeling head as presenting part, if patient stays MD will perform bedside ultrasound. Plan for patient to walk and then repeat SVE in an hour. If no change order for discharge. Also orders for tylenol 500 mg-1,000 mg PO q6hrs PRN for 30 tablets and vistaril 50 mg-100 mg PO q6hrs PRN for 30 tablets if patient would like. If change in SVE or any other concerns, update MD. Patient is scheduled for IOL on Saturday at 39 weeks.

## 2020-03-19 NOTE — PROGRESS NOTES
"PROBLEM LIST  LABS: Opos/RI GIRL (normal cell free dna)    1. Echogenic intracardiac focus: level II otherwise within normal limits, cell free dna testing negative.   2. Maternal history ASD repair: fetal echo planned.  3. CF carrier: declines carrier testing for FOB.    No concerns about the fetus, good fetal movement. Discussed kick counts.  From last visit:  \"Concerned about her mood--history of anxiety, was worse postpartum. No treatment at that time. She is seeing a therapist right now, wondering if she needs meds in addition. We discussed risks, benefits, and alternatives to this today, and her partner was present for that discussion as well. Plan would be to start a low dose of Celexa if she decides to do that now. Also discussed could start it immediately postpartum or at a short term 2 week postpartum exam if needed. Takes time ramp up, discussed. She will think things over and let me know if she wishes to try prior to her next appointment.\"    Discussed symptoms currently. She is deciding to hold off for now as she generally feels better and is anxious only about being pregnant during the current pandemic. Is interested in membrane sweeping today and induction of labor at 39 weeks if not delivered. Membranes swept today and IOL scheduled for Sat.    Signs and symptoms of labor reviewed, including when to call or come in for evaluation.     Kelsi Jane MD        "

## 2020-03-19 NOTE — PLAN OF CARE
Data: [facility] to [facility]: 3/19/2020  6:15 PM.  Reason for maternal/fetal assessment is uterine contractions. Patient reports contractions began around 1400 and they have become stronger and closer together.  Patient is a .  Prenatal record reviewed. Pregnancy has been uncomplicated..  Gestational Age 38w5d. VSS. Fetal movement present. Patient denies leaking of vaginal fluid/rupture of membranes, vaginal bleeding, nausea, vomiting, headache, visual disturbances, epigastric or URQ pain, significant edema. Support person Bibiana (friend) is present.  is a  and currently in the air so patient is unable to contact him at this time.   Action: Verbal consent for EFM. Triage assessment completed. Bill of rights reviewed. SVE 3.53.  Response: Patient verbalized agreement with plan. Will contact Dr Faby Santana with update and further orders.

## 2020-03-19 NOTE — NURSING NOTE
"Chief Complaint   Patient presents with     Prenatal Care     38 weeks 5 days, no c/o vaginal bleeding, leaking of fluids, patient has had contractions. Feeling fetal movement daily.       Initial /76 (BP Location: Right arm, Patient Position: Chair, Cuff Size: Adult Regular)   Wt 76.4 kg (168 lb 6.4 oz)   LMP 2019   BMI 30.80 kg/m   Estimated body mass index is 30.8 kg/m  as calculated from the following:    Height as of 19: 1.575 m (5' 2\").    Weight as of this encounter: 76.4 kg (168 lb 6.4 oz).  BP completed using cuff size: regular    Questioned patient about current smoking habits.  Pt. has never smoked.          The following HM Due: NONE      Ann Richard CMA               "

## 2020-03-19 NOTE — TELEPHONE ENCOUNTER
"Pregnant 38 weeks 5 days.  Contractions are 5 minutes apart for last hour.   Second delivery.  [facility] notified that patient will be coming in per care guidelines.    Veronica Waddell RN  [place] [facility]      Reason for Disposition    [1] History of prior delivery (multipara) AND [2] contractions < 10 minutes apart AND [3] present 1 hour    Additional Information    Negative: Passed out (i.e., lost consciousness, collapsed and was not responding)    Negative: Shock suspected (e.g., cold/pale/clammy skin, too weak to stand, low BP, rapid pulse)    Negative: Difficult to awaken or acting confused (e.g., disoriented, slurred speech)    Negative: [1] SEVERE abdominal pain (e.g., excruciating) AND [2] constant AND [3] present > 1 hour    Negative: Severe bleeding (e.g., continuous red blood from vagina, or large blood clots)    Negative: Umbilical cord hanging out of the vagina (shiny, white, curled appearance, \"like telephone cord\")    Negative: Uncontrollable urge to push (i.e., feels like baby is coming out now)    Negative: Can see baby    Negative: Sounds like a life-threatening emergency to the triager    Negative: [1] First baby (primipara) AND [2] contractions < 6 minutes apart  AND [3] present 2 hours    Protocols used: PREGNANCY - LABOR-A-AH      "

## 2020-03-20 ENCOUNTER — ANESTHESIA EVENT (OUTPATIENT)
Dept: OBGYN | Facility: CLINIC | Age: 30
End: 2020-03-20
Payer: COMMERCIAL

## 2020-03-20 ENCOUNTER — TELEPHONE (OUTPATIENT)
Dept: OBGYN | Facility: CLINIC | Age: 30
End: 2020-03-20

## 2020-03-20 ENCOUNTER — ANESTHESIA (OUTPATIENT)
Dept: OBGYN | Facility: CLINIC | Age: 30
End: 2020-03-20
Payer: COMMERCIAL

## 2020-03-20 ENCOUNTER — HOSPITAL ENCOUNTER (INPATIENT)
Facility: CLINIC | Age: 30
LOS: 2 days | Discharge: HOME | End: 2020-03-22
Attending: OBSTETRICS & GYNECOLOGY | Admitting: OBSTETRICS & GYNECOLOGY
Payer: COMMERCIAL

## 2020-03-20 DIAGNOSIS — Z34.80 PRENATAL CARE OF MULTIGRAVIDA, ANTEPARTUM: ICD-10-CM

## 2020-03-20 DIAGNOSIS — F41.9 ANXIETY: Primary | ICD-10-CM

## 2020-03-20 LAB
ABO + RH BLD: NORMAL
ABO + RH BLD: NORMAL
SPECIMEN EXP DATE BLD: NORMAL

## 2020-03-20 PROCEDURE — 10907ZC DRAINAGE OF AMNIOTIC FLUID, THERAPEUTIC FROM PRODUCTS OF CONCEPTION, VIA NATURAL OR ARTIFICIAL OPENING: ICD-10-PCS | Performed by: OBSTETRICS & GYNECOLOGY

## 2020-03-20 PROCEDURE — 72200001 ZZH LABOR CARE VAGINAL DELIVERY SINGLE

## 2020-03-20 PROCEDURE — 25000132 ZZH RX MED GY IP 250 OP 250 PS 637: Performed by: OBSTETRICS & GYNECOLOGY

## 2020-03-20 PROCEDURE — 3E0R3BZ INTRODUCTION OF ANESTHETIC AGENT INTO SPINAL CANAL, PERCUTANEOUS APPROACH: ICD-10-PCS | Performed by: ANESTHESIOLOGY

## 2020-03-20 PROCEDURE — 40000671 ZZH STATISTIC ANESTHESIA CASE

## 2020-03-20 PROCEDURE — 12000000 ZZH R&B MED SURG/OB

## 2020-03-20 PROCEDURE — 25000128 H RX IP 250 OP 636: Performed by: ANESTHESIOLOGY

## 2020-03-20 PROCEDURE — 00HU33Z INSERTION OF INFUSION DEVICE INTO SPINAL CANAL, PERCUTANEOUS APPROACH: ICD-10-PCS | Performed by: ANESTHESIOLOGY

## 2020-03-20 PROCEDURE — G0463 HOSPITAL OUTPT CLINIC VISIT: HCPCS

## 2020-03-20 PROCEDURE — 37000011 ZZH ANESTHESIA WARD SERVICE

## 2020-03-20 PROCEDURE — 25000125 ZZHC RX 250: Performed by: OBSTETRICS & GYNECOLOGY

## 2020-03-20 PROCEDURE — 86901 BLOOD TYPING SEROLOGIC RH(D): CPT | Performed by: OBSTETRICS & GYNECOLOGY

## 2020-03-20 PROCEDURE — 59410 OBSTETRICAL CARE: CPT | Performed by: OBSTETRICS & GYNECOLOGY

## 2020-03-20 PROCEDURE — 86780 TREPONEMA PALLIDUM: CPT | Performed by: OBSTETRICS & GYNECOLOGY

## 2020-03-20 PROCEDURE — 86900 BLOOD TYPING SEROLOGIC ABO: CPT | Performed by: OBSTETRICS & GYNECOLOGY

## 2020-03-20 PROCEDURE — 25000128 H RX IP 250 OP 636: Performed by: OBSTETRICS & GYNECOLOGY

## 2020-03-20 PROCEDURE — 25800030 ZZH RX IP 258 OP 636: Performed by: OBSTETRICS & GYNECOLOGY

## 2020-03-20 RX ORDER — METHYLERGONOVINE MALEATE 0.2 MG/ML
200 INJECTION INTRAVENOUS
Status: DISCONTINUED | OUTPATIENT
Start: 2020-03-20 | End: 2020-03-20

## 2020-03-20 RX ORDER — OXYTOCIN/0.9 % SODIUM CHLORIDE 30/500 ML
100 PLASTIC BAG, INJECTION (ML) INTRAVENOUS CONTINUOUS
Status: DISCONTINUED | OUTPATIENT
Start: 2020-03-20 | End: 2020-03-22 | Stop reason: HOSPADM

## 2020-03-20 RX ORDER — HYDROCORTISONE 2.5 %
CREAM (GRAM) TOPICAL 3 TIMES DAILY PRN
Status: DISCONTINUED | OUTPATIENT
Start: 2020-03-20 | End: 2020-03-22 | Stop reason: HOSPADM

## 2020-03-20 RX ORDER — NALOXONE HYDROCHLORIDE 0.4 MG/ML
.1-.4 INJECTION, SOLUTION INTRAMUSCULAR; INTRAVENOUS; SUBCUTANEOUS
Status: DISCONTINUED | OUTPATIENT
Start: 2020-03-20 | End: 2020-03-22 | Stop reason: HOSPADM

## 2020-03-20 RX ORDER — BISACODYL 10 MG
10 SUPPOSITORY, RECTAL RECTAL DAILY PRN
Status: DISCONTINUED | OUTPATIENT
Start: 2020-03-22 | End: 2020-03-22 | Stop reason: HOSPADM

## 2020-03-20 RX ORDER — IBUPROFEN 800 MG/1
800 TABLET, FILM COATED ORAL EVERY 6 HOURS PRN
Status: DISCONTINUED | OUTPATIENT
Start: 2020-03-20 | End: 2020-03-22 | Stop reason: HOSPADM

## 2020-03-20 RX ORDER — MISOPROSTOL 200 UG/1
800 TABLET ORAL
Status: DISCONTINUED | OUTPATIENT
Start: 2020-03-20 | End: 2020-03-22 | Stop reason: HOSPADM

## 2020-03-20 RX ORDER — OXYTOCIN/0.9 % SODIUM CHLORIDE 30/500 ML
1-24 PLASTIC BAG, INJECTION (ML) INTRAVENOUS CONTINUOUS
Status: DISCONTINUED | OUTPATIENT
Start: 2020-03-20 | End: 2020-03-20

## 2020-03-20 RX ORDER — SODIUM CHLORIDE, SODIUM LACTATE, POTASSIUM CHLORIDE, CALCIUM CHLORIDE 600; 310; 30; 20 MG/100ML; MG/100ML; MG/100ML; MG/100ML
INJECTION, SOLUTION INTRAVENOUS CONTINUOUS
Status: DISCONTINUED | OUTPATIENT
Start: 2020-03-20 | End: 2020-03-20

## 2020-03-20 RX ORDER — FERROUS SULFATE 325(65) MG
325 TABLET ORAL DAILY
Status: DISCONTINUED | OUTPATIENT
Start: 2020-03-21 | End: 2020-03-22 | Stop reason: HOSPADM

## 2020-03-20 RX ORDER — LIDOCAINE 40 MG/G
CREAM TOPICAL
Status: DISCONTINUED | OUTPATIENT
Start: 2020-03-20 | End: 2020-03-20

## 2020-03-20 RX ORDER — NALBUPHINE HYDROCHLORIDE 20 MG/ML
2.5-5 INJECTION, SOLUTION INTRAMUSCULAR; INTRAVENOUS; SUBCUTANEOUS EVERY 6 HOURS PRN
Status: DISCONTINUED | OUTPATIENT
Start: 2020-03-20 | End: 2020-03-20

## 2020-03-20 RX ORDER — FENTANYL CITRATE 50 UG/ML
50-100 INJECTION, SOLUTION INTRAMUSCULAR; INTRAVENOUS
Status: DISCONTINUED | OUTPATIENT
Start: 2020-03-20 | End: 2020-03-20

## 2020-03-20 RX ORDER — ACETAMINOPHEN 325 MG/1
650 TABLET ORAL EVERY 4 HOURS PRN
Status: DISCONTINUED | OUTPATIENT
Start: 2020-03-20 | End: 2020-03-22 | Stop reason: HOSPADM

## 2020-03-20 RX ORDER — AMOXICILLIN 250 MG
1 CAPSULE ORAL 2 TIMES DAILY
Status: DISCONTINUED | OUTPATIENT
Start: 2020-03-20 | End: 2020-03-22 | Stop reason: HOSPADM

## 2020-03-20 RX ORDER — OXYTOCIN/0.9 % SODIUM CHLORIDE 30/500 ML
340 PLASTIC BAG, INJECTION (ML) INTRAVENOUS CONTINUOUS PRN
Status: DISCONTINUED | OUTPATIENT
Start: 2020-03-20 | End: 2020-03-22 | Stop reason: HOSPADM

## 2020-03-20 RX ORDER — EPHEDRINE SULFATE 50 MG/ML
5 INJECTION, SOLUTION INTRAMUSCULAR; INTRAVENOUS; SUBCUTANEOUS
Status: DISCONTINUED | OUTPATIENT
Start: 2020-03-20 | End: 2020-03-20

## 2020-03-20 RX ORDER — OXYTOCIN 10 [USP'U]/ML
10 INJECTION, SOLUTION INTRAMUSCULAR; INTRAVENOUS
Status: DISCONTINUED | OUTPATIENT
Start: 2020-03-20 | End: 2020-03-22 | Stop reason: HOSPADM

## 2020-03-20 RX ORDER — AMOXICILLIN 250 MG
2 CAPSULE ORAL 2 TIMES DAILY
Status: DISCONTINUED | OUTPATIENT
Start: 2020-03-20 | End: 2020-03-22 | Stop reason: HOSPADM

## 2020-03-20 RX ORDER — OXYTOCIN 10 [USP'U]/ML
10 INJECTION, SOLUTION INTRAMUSCULAR; INTRAVENOUS
Status: DISCONTINUED | OUTPATIENT
Start: 2020-03-20 | End: 2020-03-20

## 2020-03-20 RX ORDER — ONDANSETRON 2 MG/ML
4 INJECTION INTRAMUSCULAR; INTRAVENOUS EVERY 6 HOURS PRN
Status: DISCONTINUED | OUTPATIENT
Start: 2020-03-20 | End: 2020-03-20

## 2020-03-20 RX ORDER — OXYCODONE AND ACETAMINOPHEN 5; 325 MG/1; MG/1
1 TABLET ORAL
Status: DISCONTINUED | OUTPATIENT
Start: 2020-03-20 | End: 2020-03-20

## 2020-03-20 RX ORDER — IBUPROFEN 800 MG/1
800 TABLET, FILM COATED ORAL
Status: DISCONTINUED | OUTPATIENT
Start: 2020-03-20 | End: 2020-03-20

## 2020-03-20 RX ORDER — LANOLIN 100 %
OINTMENT (GRAM) TOPICAL
Status: DISCONTINUED | OUTPATIENT
Start: 2020-03-20 | End: 2020-03-22 | Stop reason: HOSPADM

## 2020-03-20 RX ORDER — PENICILLIN G POTASSIUM 5000000 [IU]/1
5 INJECTION, POWDER, FOR SOLUTION INTRAMUSCULAR; INTRAVENOUS ONCE
Status: COMPLETED | OUTPATIENT
Start: 2020-03-20 | End: 2020-03-20

## 2020-03-20 RX ORDER — NALOXONE HYDROCHLORIDE 0.4 MG/ML
.1-.4 INJECTION, SOLUTION INTRAMUSCULAR; INTRAVENOUS; SUBCUTANEOUS
Status: DISCONTINUED | OUTPATIENT
Start: 2020-03-20 | End: 2020-03-20

## 2020-03-20 RX ORDER — CARBOPROST TROMETHAMINE 250 UG/ML
250 INJECTION, SOLUTION INTRAMUSCULAR
Status: DISCONTINUED | OUTPATIENT
Start: 2020-03-20 | End: 2020-03-20

## 2020-03-20 RX ORDER — OXYTOCIN/0.9 % SODIUM CHLORIDE 30/500 ML
100-340 PLASTIC BAG, INJECTION (ML) INTRAVENOUS CONTINUOUS PRN
Status: DISCONTINUED | OUTPATIENT
Start: 2020-03-20 | End: 2020-03-20

## 2020-03-20 RX ORDER — FAMOTIDINE 20 MG/1
20 TABLET, FILM COATED ORAL DAILY
Status: DISCONTINUED | OUTPATIENT
Start: 2020-03-21 | End: 2020-03-22 | Stop reason: HOSPADM

## 2020-03-20 RX ORDER — ACETAMINOPHEN 325 MG/1
650 TABLET ORAL EVERY 4 HOURS PRN
Status: DISCONTINUED | OUTPATIENT
Start: 2020-03-20 | End: 2020-03-20

## 2020-03-20 RX ADMIN — Medication 2 MILLI-UNITS/MIN: at 15:56

## 2020-03-20 RX ADMIN — FENTANYL CITRATE 100 MCG: 50 INJECTION INTRAMUSCULAR; INTRAVENOUS at 12:27

## 2020-03-20 RX ADMIN — SENNOSIDES AND DOCUSATE SODIUM 1 TABLET: 8.6; 5 TABLET ORAL at 22:06

## 2020-03-20 RX ADMIN — SODIUM CHLORIDE, POTASSIUM CHLORIDE, SODIUM LACTATE AND CALCIUM CHLORIDE: 600; 310; 30; 20 INJECTION, SOLUTION INTRAVENOUS at 15:08

## 2020-03-20 RX ADMIN — PENICILLIN G POTASSIUM 5 MILLION UNITS: 5000000 POWDER, FOR SOLUTION INTRAMUSCULAR; INTRAPLEURAL; INTRATHECAL; INTRAVENOUS at 12:02

## 2020-03-20 RX ADMIN — IBUPROFEN 800 MG: 800 TABLET ORAL at 22:06

## 2020-03-20 RX ADMIN — SODIUM CHLORIDE 2.5 MILLION UNITS: 9 INJECTION, SOLUTION INTRAVENOUS at 15:56

## 2020-03-20 RX ADMIN — SODIUM CHLORIDE, POTASSIUM CHLORIDE, SODIUM LACTATE AND CALCIUM CHLORIDE: 600; 310; 30; 20 INJECTION, SOLUTION INTRAVENOUS at 12:02

## 2020-03-20 RX ADMIN — Medication: at 14:20

## 2020-03-20 RX ADMIN — SODIUM CHLORIDE 2.5 MILLION UNITS: 9 INJECTION, SOLUTION INTRAVENOUS at 19:45

## 2020-03-20 RX ADMIN — FENTANYL CITRATE 100 MCG: 50 INJECTION INTRAMUSCULAR; INTRAVENOUS at 13:50

## 2020-03-20 NOTE — PROVIDER NOTIFICATION
03/20/20 1545   Provider Notification   Provider Name/Title Dr. Summers   Method of Notification At Bedside   Request Evaluate in Person   Notification Reason Status Update   MD updated on FHT's, contraction pattern, contraction difficult to monitor, and patient comfortable with epidural.  Orders received to begin pitocin augmentation.

## 2020-03-20 NOTE — ANESTHESIA PROCEDURE NOTES
Peripheral nerve/Neuraxial procedure note : epidural catheter  Pre-Procedure  Performed by Benjamin Cabrera MD  Referred by Kristopher  Location: OB      Pre-Anesthestic Checklist: patient identified, IV checked, site marked, risks and benefits discussed, informed consent, monitors and equipment checked, pre-op evaluation, at physician/surgeon's request and post-op pain management    Timeout  Correct Patient: Yes   Correct Procedure: Yes   Correct Site: Yes   Correct Laterality: Yes   Correct Position: Yes   Site Marked: Yes   .   Procedure Documentation    .    Procedure: epidural catheter, .       .  .        Assessment/Narrative  .  .  . Comments:  Patient desires Labor Epidural for labor analgesia. Vaginal delivery anticipated.    Chart reviewed. Patient examined. No changes to pre procedure chart review. Risks including but not limited to bleeding, infection, nerve injury, PDPH, intrathecal injection, high block, incomplete block, one-sided block, back pain, and low blood pressure discussed in detail. Questions answered. Consent signed.    Pause for the Cause completed. NIBP and pulse ox functioning. L&D nurse present.    Procedure: Sitting. Betadine prep x 3. Sterile drape applied.  Lidocaine 1% x 2 cc local infiltration at L 3-4.  17 G. Tu needle ML GLADYS 1 attempt.  No CSF, paresthesia or blood. 20 g. Epidural catheter inserted w/o resistance 5 cm.  Negative aspiration for CSF and blood. Filter in line.  Test dose Lidocaine 1.5% w/ 1:200,000 epi x 3 cc injected. Negative for neuro change or symptoms of intravascular injection.  Bolus dose: Marcaine 0.25% 5cc x 2 doses (10 cc total).  Infusion orders written.    I or my partner am immediately available. I or my partner will monitor the patient and supervise nursing care at necessary intervals.    Cindy

## 2020-03-20 NOTE — PLAN OF CARE
Pt walked for almost an hour,SVE done unchanged,pt is disappointed and worried re conts and  GBS positive.Answered her questions and reassurance provided and explained and educated incase of inadequate antibiotics received during labor,and also  she will get Vistaril for rest and sleep.Pt voiced understandings.

## 2020-03-20 NOTE — PROVIDER NOTIFICATION
03/19/20 2030   Provider Notification   Provider Name/Title Dr irene ZAMBRANO   Method of Notification In Department   Request Evaluate - Remote   Notification Reason SVE;Status Update;Other (Comment);Uterine Activity   Pt walked for 45-60 min,SVE unchanged,Pt reported her conts are strong however on palpation mild and pt was sitting comfortably without any distress.MD gave discharge orders and placed Meds orders;Vistaril.POC d/w pt and she agreed.

## 2020-03-20 NOTE — PROVIDER NOTIFICATION
03/20/20 1125   Provider Notification   Provider Name/Title Dr. LOU Summers   Method of Notification In Department   Request Evaluate - Remote   Notification Reason SVE     MD called and updated on SVE.  Patient is 5 cm now.  Orders to admit patient and continue to monitor.  Will update MD as needed.      Report given to Lisa KEMP who assumes care at this time.

## 2020-03-20 NOTE — ANESTHESIA PREPROCEDURE EVALUATION
Anesthesia Pre-Procedure Evaluation    Patient: Ofelia Will   MRN: [id number] : 1990          Preoperative Diagnosis: * No surgery found *        Past Medical History:   Diagnosis Date     ASD (atrial septal defect)     s/p surgical closer age 11 to 13 in [place], asymptomatic several follow ups , last one  neg no further follow up      History of anemia     treatred with iron      History of vitamin D deficiency      Left otitis media 2014    treated iw augmentin     Mental disorder 2014    anxiety     Past Surgical History:   Procedure Laterality Date     REPAIR ATRIAL SEPTAL DEFECT  2002    at age 11 to 13      TONSILLECTOMY       Anesthesia Evaluation       history and physical reviewed .      No history of anesthetic complications          ROS/MED HX    ENT/Pulmonary:  - neg pulmonary ROS     Neurologic:  - neg neurologic ROS     Cardiovascular:     (+) ----. : . . . :. . congenital heart disease ASD s/p repair at age 11:,      (-) PIH   METS/Exercise Tolerance:     Hematologic:         Musculoskeletal:         GI/Hepatic:     (+) GERD       Renal/Genitourinary:         Endo:         Psychiatric:     (+) psychiatric history anxiety      Infectious Disease:         Malignancy:         Other:                     neg OB ROS            Physical Exam      Airway   Mallampati: II  TM distance: > 3 FB  Neck ROM: full  Mouth opening: > 3 cm    Dental     Cardiovascular   (+) murmur     PE comment: ASD repair at age 11    Pulmonary     Other findings: Lab Test        19                       1034          1033          2147          1624          WBC          7.6          7.3           --          12.2*         HGB          10.9*        13.5         13.3         11.0*         MCV          92           89            --          94            PLT          193          254           --          223            Lab Test        17                "        1414          NA           139           POTASSIUM    3.9           CHLORIDE     106           CO2          26            BUN          9             CR           0.66          ANIONGAP     7             JUDY          9.5           GLC          84                  Lab Results   Component Value Date    WBC 7.6 12/24/2019    HGB 10.9 (L) 12/24/2019    HCT 32.8 (L) 12/24/2019     12/24/2019     12/05/2017    POTASSIUM 3.9 12/05/2017    CHLORIDE 106 12/05/2017    CO2 26 12/05/2017    BUN 9 12/05/2017    CR 0.66 12/05/2017    GLC 84 12/05/2017    JUDY 9.5 12/05/2017    ALBUMIN 4.4 12/05/2017    PROTTOTAL 8.4 12/05/2017    ALT 21 12/05/2017    AST 19 12/05/2017    ALKPHOS 73 12/05/2017    BILITOTAL 0.6 12/05/2017    TSH 1.18 12/05/2017       Preop Vitals  BP Readings from Last 3 Encounters:   03/20/20 114/71   03/19/20 125/81   03/19/20 118/76    Pulse Readings from Last 3 Encounters:   09/13/19 109   08/12/19 80   12/29/18 95      Resp Readings from Last 3 Encounters:   03/20/20 18   03/19/20 16   09/13/19 16    SpO2 Readings from Last 3 Encounters:   09/13/19 100%   12/29/18 99%   04/05/18 100%      Temp Readings from Last 1 Encounters:   03/20/20 98.4  F (36.9  C) (Oral)    Ht Readings from Last 1 Encounters:   03/19/20 1.588 m (5' 2.5\")      Wt Readings from Last 1 Encounters:   03/19/20 76.4 kg (168 lb 6.4 oz)    Estimated body mass index is 30.31 kg/m  as calculated from the following:    Height as of 3/19/20: 1.588 m (5' 2.5\").    Weight as of 3/19/20: 76.4 kg (168 lb 6.4 oz).       Anesthesia Plan      History & Physical Review      ASA Status:  .  OB Epidural Asa: 2            Postoperative Care      Consents  Anesthetic plan, risks, benefits and alternatives discussed with:  Patient..                 Benjamin Cabrera MD                    .  "

## 2020-03-20 NOTE — PROVIDER NOTIFICATION
03/20/20 1215   Provider Notification   Provider Name/Title Dr. Summers   Method of Notification In Department   Request Evaluate in Person   Notification Reason Status Update   MD updated on FHT's, contraction pattern, penicillin infusing, and patient getting more uncomfortable.  MD will plan to come perform AROM in a couple hours.

## 2020-03-20 NOTE — PROVIDER NOTIFICATION
03/20/20 1358   Provider Notification   Provider Name/Title Dr. Cabrera   Method of Notification Phone   Request Evaluate in Person   Notification Reason Status Update

## 2020-03-20 NOTE — PLAN OF CARE
Data: Patient assessed in the [facility] for uterine contractions.  Cervical exam mid position, thick,30-40% effaced,long and soft.  Membranes intact.  Contractions 3-4 min apart and mild intensity.Pt was sitting without any distress during entire  conservation.  Action:  Presumed adequate fetal oxygenation documented (see flow record). Discharge instructions reviewed.  Patient instructed to report change in fetal movement, vaginal leaking of fluid or bleeding, abdominal pain, or any concerns related to the pregnancy to her nurse/physician.    Response: Orders to discharge home per Faby Santana.  Patient verbalized understanding of education and verbalized agreement with plan. Discharged to home at 2103.

## 2020-03-20 NOTE — TELEPHONE ENCOUNTER
Pt calling thinking she is in labor.  She was sent home from L&D last night after evaluation, ctx continued throughout the night, she got intermittent sleep.      Ctx every 5 minutes now, painful enough she has to stop and breathe through them.  She notes bloody show this AM.  Denies LOF.  She has felt some fetal movement but is unsure if it is normal, as she has been focusing on labor pain.    GBS +, SVE was 3.5 when discharged last evening.    Pt advised to go to L&D, L&D notified.  ON call MD notified.    Christina Isabel RN

## 2020-03-20 NOTE — PROVIDER NOTIFICATION
20 0945   Provider Notification   Provider Name/Title Dr. LOU Summers   Method of Notification In Department   Request Evaluate - Remote   Notification Reason Patient Arrived;SVE;Status Update     MD updated on patient arrival.  She was discharged from triage last evening.  She is a  at 38 6/7ths weeks.  Pregnancy has been uncomplicated, but she is GBS positive.  Patient denies any headache, blurred vision, epigastric pain, fever, rash, or shortness of breath.  She came in stating that her contractions have gotten more intense and she is now having a some bleeding. She denies any leaking of fluid and is still feeling baby move.  SVE: 4 cm, 40%, -3 station.  MD okay with the patient ambulating around the unit and having her cervix rechecked in an hour and a half.  Will update patient and continue to monitor, MD will be updated as needed.

## 2020-03-20 NOTE — PROVIDER NOTIFICATION
03/19/20 1915   Labor Care   Activity In Labor up in meza ad farhat   Patient off monitors to ambulate hallways.  Instructed to notify nurse if patient notices any significant changes in condition.

## 2020-03-20 NOTE — DISCHARGE INSTRUCTIONS
Discharge Instruction for Undelivered Patients      You were seen for: Labor Assessment  We Consulted: Dr Hobbs  You had (Test or Medicine):NSt and SVE.     Diet:   Drink 8 to 12 glasses of liquids (milk, juice, water) every day.  You may eat meals and snacks.     Activity:  Call your doctor or nurse midwife if your baby is moving less than usual.     Call your provider if you notice:  Swelling in your face or increased swelling in your hands or legs.  Headaches that are not relieved by Tylenol (acetaminophen).  Changes in your vision (blurring: seeing spots or stars.)  Nausea (sick to your stomach) and vomiting (throwing up).   Weight gain of 5 pounds or more per week.  Heartburn that doesn't go away.  Signs of bladder infection: pain when you urinate (use the toilet), need to go more often and more urgently.  The bag of joaquin (rupture of membranes) breaks, or you notice leaking in your underwear.  Bright red blood in your underwear.  Abdominal (lower belly) or stomach pain.  For first baby: Contractions (tightening) less than 5 minutes apart for one hour or more.  Second (plus) baby: Contractions (tightening) less than 10 minutes apart and getting stronger.  Increase or change in vaginal discharge (note the color and amount)  Other: Take warm bath and use comfort measures;listen music and aromatherapy.    Follow-up:  As scheduled in the clinic

## 2020-03-21 LAB
HGB BLD-MCNC: 11.5 G/DL (ref 11.7–15.7)
T PALLIDUM AB SER QL: NONREACTIVE

## 2020-03-21 PROCEDURE — 25000132 ZZH RX MED GY IP 250 OP 250 PS 637: Performed by: FAMILY MEDICINE

## 2020-03-21 PROCEDURE — 36415 COLL VENOUS BLD VENIPUNCTURE: CPT | Performed by: OBSTETRICS & GYNECOLOGY

## 2020-03-21 PROCEDURE — 25000132 ZZH RX MED GY IP 250 OP 250 PS 637: Performed by: OBSTETRICS & GYNECOLOGY

## 2020-03-21 PROCEDURE — 85018 HEMOGLOBIN: CPT | Performed by: OBSTETRICS & GYNECOLOGY

## 2020-03-21 PROCEDURE — 12000000 ZZH R&B MED SURG/OB

## 2020-03-21 RX ORDER — HYDROXYZINE HYDROCHLORIDE 10 MG/1
10 TABLET, FILM COATED ORAL EVERY 6 HOURS PRN
Status: DISCONTINUED | OUTPATIENT
Start: 2020-03-21 | End: 2020-03-22 | Stop reason: HOSPADM

## 2020-03-21 RX ADMIN — HYDROXYZINE HYDROCHLORIDE 10 MG: 10 TABLET, FILM COATED ORAL at 22:54

## 2020-03-21 RX ADMIN — SENNOSIDES AND DOCUSATE SODIUM 1 TABLET: 8.6; 5 TABLET ORAL at 20:49

## 2020-03-21 RX ADMIN — IBUPROFEN 800 MG: 800 TABLET ORAL at 17:40

## 2020-03-21 RX ADMIN — IBUPROFEN 800 MG: 800 TABLET ORAL at 23:58

## 2020-03-21 RX ADMIN — IBUPROFEN 800 MG: 800 TABLET ORAL at 12:13

## 2020-03-21 RX ADMIN — IBUPROFEN 800 MG: 800 TABLET ORAL at 05:35

## 2020-03-21 RX ADMIN — SENNOSIDES AND DOCUSATE SODIUM 1 TABLET: 8.6; 5 TABLET ORAL at 09:49

## 2020-03-21 RX ADMIN — FERROUS SULFATE TAB 325 MG (65 MG ELEMENTAL FE) 325 MG: 325 (65 FE) TAB at 09:49

## 2020-03-21 NOTE — PLAN OF CARE
Vitals stable. Ambulating. Tolerating regular diet.  Chris at bedside and supportive. Visiting infant in NICU frequently, breast feeding infant.

## 2020-03-21 NOTE — PLAN OF CARE
Data: Ofelia Will transferred to [facility] via wheelchair at 2305. Baby transferred via parent's arms.  Action: Receiving unit notified of transfer: Yes. Patient and family notified of room change. Report given to Milla KEMP at 2340. Belongings sent to receiving unit. Accompanied by Registered Nurse. Oriented patient to surroundings. Call light within reach. ID bands double-checked with receiving RN.  Response: Patient tolerated transfer and is stable. Fall risk band active.    Patients mobililty level scored using the bedside mobility assistance tool (BMAT). Patient is at a mobility level test number: 3. Mobility equipment used: wheelchair. Required assist of 1 staff members. Further use of BMAT scoring required.

## 2020-03-21 NOTE — ANESTHESIA POSTPROCEDURE EVALUATION
Patient: Ofelia Will    * No procedures listed *    Diagnosis:* No pre-op diagnosis entered *  Diagnosis Additional Information: No value filed.    Anesthesia Type:  No value filed.    Note:  Anesthesia Post Evaluation         Comments:     S/P epidural for labor.   I or my partner was immediately available for management of this patient during epidural analgesia infusion.  VSS.  Doing well. Block resolved.  Neuro at baseline. Denies positional headache. Minimal side effects easily managed w/ PRN meds. No apparent anesthetic complications. No follow-up required.    LUCY Parks MD          Last vitals:  Vitals:    03/20/20 2240 03/21/20 0137 03/21/20 0537   BP: 107/58 108/70 112/76   Pulse:  84 65   Resp:  16 16   Temp:  98.3  F (36.8  C) 97.4  F (36.3  C)   SpO2:            Electronically Signed By: Hill Parks MD  March 21, 2020  8:41 AM

## 2020-03-21 NOTE — PROVIDER NOTIFICATION
03/20/20 2000   Provider Notification   Provider Name/Title    Method of Notification Phone   Request Evaluate - Remote   Notification Reason Decels;Labor Status;Uterine Activity;SVE    called from the bedside r/t PD, repositioned, LR infusion increased, pitocin stopped, SVE 8/100/0 with BBOW. Request MD to come to hospital, plan to notify IN HOUSE OB to rupture membranes and place FSE. MD ok with plan and in route to hospital.

## 2020-03-21 NOTE — L&D DELIVERY NOTE
OB Vaginal Delivery Note    Ofelia Will MRN# [id number]   Age: 29 year old YOB: 1990       GA: 38w6d  GP:   Labor Complications: GBS , nuchal cord x1  Delivery QBL:  Not yet recorded ~ 50cc  Delivery Type: Vaginal, Spontaneous   ROM to Delivery Time: (Delivered) Minutes: 25   Weight:     1 Minute 5 Minute 10 Minute   Apgar Totals:                 Delivery Details:  Ofelia iWll, a 29 year old  female delivered a viable infant with apgars not yet recorded. Patient presented in latent labor and received pitocin augmentation and was eventually AROMed at 8cm. She then progressed quickly and was complete within minutes. Delivery was via vaginal, spontaneous  to a sterile field under epidural   anesthesia. Infant delivered in vertex  right  occiput  anterior  position. Anterior and posterior shoulders delivered without difficulty. The cord was clamped, cut twice and  3 vessels were noted. Cord blood was obtained in routine fashion with the following disposition: sent due to Cat II tracing with variable decels to the 60s  .      Cord complications:    Placenta delivered at  . Placental disposition was Hospital disposal . Fundal massage performed and fundus found to be firm.     Episiotomy: none    Perineum, vagina, cervix were inspected, and the following lacerations were noted:   Perineal lacerations: none              Excellent hemostasis was noted. Needle count correct. Infant and patient in delivery room in good and stable condition.        Labor Event Times    Start pushing date/time:  3/20/2020 2024      Rupture date/time: 3/20/20 2009   Rupture type:  Artificial Rupture of Membranes  Fluid color:  Clear  Fluid odor:  Normal     Delivery/Placenta Date and Time    Delivery Date:  3/20/20 Delivery Time:   8:34 PM   Oxytocin given at the time of delivery:  after delivery of placenta     Vaginal Counts     Initial count performed by 2 team members:   Two Team  Members   Dr. Kristopher Wilkerson       Needles Suture [place] [employer]   Initial counts 2  5    Added to count       Final counts       Placed during labor Accounted for at the end of labor   NA            Apgars    Living status:  Living   1 Minute 5 Minute 10 Minute 15 Minute 20 Minute   Skin color:        Heart rate:        Reflex irritability:        Muscle tone:        Respiratory effort:        Total:           Labor Events and Shoulder Dystocia    Fetal Tracing Prior to Delivery:  Category 2  Fetal Tracing Comments:  recurrent variables  Shoulder dystocia present?:  Neg     Delivery (Maternal) (Provider to Complete) ([id number])    Episiotomy:  None  Perineal lacerations:  None       Blood Loss  Mother: Ofelia Will #[id number]   Start of Mother's Information    IO Blood Loss  03/20/20 0834 - 03/20/20 2048    None           End of Mother's Information  Mother: Ofelia Will #[id number]         Delivery - Provider to Complete ([id number])    Delivering clinician:  Ellen Summers MD  Attempted Delivery Types (Choose all that apply):  Spontaneous Vaginal Delivery  Delivery Type (Choose the 1 that will go to the Birth History):  Vaginal, Spontaneous          Placenta    Delayed Cord Clamping:  Done  Removal:  Expressed  Disposition:  Hospital disposal     Presentation and Position    Presentation:  Vertex  Position:  Right Occiput Anterior           Ellen Summers MD

## 2020-03-21 NOTE — H&P
No significant change in general health status based on examination of the patient, review of Nursing Admission Database and prenatal record.    Ofelia Will is a 29 year old female  at 38w6d here with latent labor, planned IOL tomorrow, painful contractions x24 hours. Baby ballotable, AROM when able.    1) admit to L&D  2). GBS negative.  3) comfort measures when appropriate  4) Anticipate .    Ellen Summers MD

## 2020-03-21 NOTE — PROGRESS NOTES
[facility]   Post-Partum Progress Note          Assessment and Plan:    Assessment:   Post-partum day #1  Normal spontaneous vaginal delivery  L&D complications: * No surgery found *  None      Doing well.  Pain well-controlled.      Plan:   Ambulation encouraged  Anticipate discharge tomorrow, baby in NICU hope for discharge tomorrow           Interval History:   Doing well.  Pain is well-controlled.  No fevers.  No history of foul-smelling vaginal discharge.  Good appetite.  Denies chest pain, shortness of breath, nausea or vomiting.  Vaginal bleeding is similar to a heavy menstrual flow.  Ambulatory.  Breastfeeding well.          Significant Problems:    None          Review of Systems:    CONSTITUTIONAL: NEGATIVE for fever, chills, change in weight  INTEGUMENTARY/SKIN: NEGATIVE for worrisome rashes, moles or lesions  EYES: NEGATIVE for vision changes or irritation  ENT/MOUTH: NEGATIVE for ear, mouth and throat problems  RESP: NEGATIVE for significant cough or SOB  BREAST: NEGATIVE for masses, tenderness or discharge  CV: NEGATIVE for chest pain, palpitations or peripheral edema  GI: NEGATIVE for nausea, abdominal pain, heartburn, or change in bowel habits  : NEGATIVE for frequency, dysuria, or hematuria  MUSCULOSKELETAL: NEGATIVE for significant arthralgias or myalgia  NEURO: NEGATIVE for weakness, dizziness or paresthesias  ENDOCRINE: NEGATIVE for temperature intolerance, skin/hair changes  HEME: NEGATIVE for bleeding problems  PSYCHIATRIC: NEGATIVE for changes in mood or affect          Medications:   All medications related to the patient's surgery have been reviewed          Physical Exam:     All vitals stable  Patient Vitals for the past 8 hrs:   BP Temp Temp src Pulse Resp   03/21/20 0537 112/76 97.4  F (36.3  C) Oral 65 16     Uterine fundus is firm, non-tender and at the level of the umbilicus          Data:     All laboratory data related to this surgery reviewed  Hemoglobin   Date  Value Ref Range Status   12/24/2019 10.9 (L) 11.7 - 15.7 g/dL Final   08/12/2019 13.5 11.7 - 15.7 g/dL Final   09/20/2018 13.3 11.7 - 15.7 g/dL Final   06/28/2018 11.0 (L) 11.7 - 15.7 g/dL Final   03/01/2018 12.3 11.7 - 15.7 g/dL Final     -    Anupama Bardales DO

## 2020-03-21 NOTE — PROVIDER NOTIFICATION
03/20/20 2005   Provider Notification   Provider Name/Title  in house OB   Method of Notification At Bedside   Request Evaluate - Remote   Notification Reason Decels;Labor Status;Status Update;SVE    at the bedside to evaluate. FHT showing PD, adjusted monitor frequently with audible HR 80-90's. Oxygen, LR infusion and pitocin off.  SVE 8/100/0 with BBOW recommend AROM and place FSE.  in route to hospital.  AROM with clear fluid and FSE applied. SVE 9.5.  will remain at the bedside and readily available if needed. Patient reporting vaginal pressure since AROM.

## 2020-03-21 NOTE — PLAN OF CARE
VSS.  Pain well controlled. Bleeding and fundal checks WNL. Voiding without difficulty, frequent voiding encouraged.  Breastfeeding, good latch observed.  Mother and father bonding well with .   transferred to NICU at 0530.      Patients mobililty level scored using the bedside mobility assistance tool (BMAT). Patient is at a mobility level test number: 4. Mobility equipment used: none required. Required assist of 0 staff members. Further use of BMAT scoring not required.

## 2020-03-21 NOTE — PROVIDER NOTIFICATION
03/20/20 1911   Provider Notification   Provider Name/Title Dr. Summers   Method of Notification Phone   Request Evaluate - Remote   Notification Reason Status Update   MD updated on FHT's with recurrent VD, contraction pattern, and SVE with station still -3.  Orders received to continue to monitor and recheck SVE in 1.5 hours. Adelaida RN assuming all cares.

## 2020-03-21 NOTE — PLAN OF CARE
Vital signs stable on room air. Pt has been in the NICU visiting her infant much of the shift. She is ambulating to and from the NICU on her own.  is also present and supportive. Tolerating a regular diet. Pain is adequately controled with ibuprofen.

## 2020-03-22 VITALS
SYSTOLIC BLOOD PRESSURE: 118 MMHG | DIASTOLIC BLOOD PRESSURE: 73 MMHG | TEMPERATURE: 97.3 F | HEART RATE: 86 BPM | RESPIRATION RATE: 16 BRPM | OXYGEN SATURATION: 100 %

## 2020-03-22 PROCEDURE — 25000132 ZZH RX MED GY IP 250 OP 250 PS 637: Performed by: OBSTETRICS & GYNECOLOGY

## 2020-03-22 RX ORDER — LORAZEPAM 0.5 MG/1
0.5 TABLET ORAL EVERY 6 HOURS PRN
Qty: 30 TABLET | Refills: 0 | Status: SHIPPED | OUTPATIENT
Start: 2020-03-22

## 2020-03-22 RX ORDER — BREAST PUMP
1 EACH MISCELLANEOUS PRN
Qty: 1 EACH | Refills: 0 | Status: SHIPPED | OUTPATIENT
Start: 2020-03-22

## 2020-03-22 RX ORDER — DOCUSATE SODIUM 100 MG/1
100 CAPSULE, LIQUID FILLED ORAL 2 TIMES DAILY PRN
Qty: 60 CAPSULE | Refills: 0 | Status: SHIPPED | OUTPATIENT
Start: 2020-03-22

## 2020-03-22 RX ADMIN — SENNOSIDES AND DOCUSATE SODIUM 1 TABLET: 8.6; 5 TABLET ORAL at 08:57

## 2020-03-22 RX ADMIN — IBUPROFEN 800 MG: 800 TABLET ORAL at 07:51

## 2020-03-22 RX ADMIN — IBUPROFEN 800 MG: 800 TABLET ORAL at 14:19

## 2020-03-22 NOTE — PROGRESS NOTES
Patient's After Visit Summary was reviewed with patient and/or spouse.   Patient verbalized understanding of After Visit Summary, recommended follow up and was given an opportunity to ask questions.   Discharge medications sent home with patient/family: YES   Discharged with spouse at 1805. Infant in NICU, anticipating discharge tomorrow.

## 2020-03-22 NOTE — PLAN OF CARE
Vital signs stable on room air. Pt has spent most of the day down in the NICU visiting her daughter. She ambulates independently. She has pumped to help with maintaining supply. Pain adequately controled with ibuprofen.  is present and supportive. Pt received her breastpump for discharge. Education covered, see education records.

## 2020-03-22 NOTE — PROVIDER NOTIFICATION
03/21/20 2200   Provider Notification   Provider Name/Title Dr. Bardales   Method of Notification Phone   Request Evaluate-Remote   Notification Reason Other     Paged Jeffy due to pt having high anxiety this evening, tearful, having a hard time catching her breath. She states she does have anxiety attacks at times. She states she knows she won't be able to sleep tonight with her mind racing.     Atarax ordered per MD, 10mg q6h PRN.     Also suggested to pt that she download the Calm evin on her phone. Brought in lavender essential oils.

## 2020-03-22 NOTE — DISCHARGE SUMMARY
[facility] Post-Partum Progress Note          Assessment and Plan:    Assessment:   Post-partum day #2  Normal spontaneous vaginal delivery  L&D complications: None  Baby in NICU on antibiotics - possible discharge tonight      Doing well.  No excessive bleeding  Pain well-controlled.  Increased anxiety - requesting medication at discharge      Plan:   Ambulation encouraged  Reportable signs and symptoms dicussed with the patient  Discharge later today  Scripts for ativan and a breast pump to be provided           Interval History:   Doing well.  Pain is well-controlled.  No fevers.  No history of foul-smelling vaginal discharge.  Good appetite.  Denies chest pain, shortness of breath, nausea or vomiting.  Vaginal bleeding is similar to a heavy menstrual flow.  Ambulatory.  Breastfeeding well.          Significant Problems:      Past Medical History:   Diagnosis Date     ASD (atrial septal defect)     s/p surgical closer age 11 to 13 in [place], [facility] several follow ups , last one 2010 neg no further follow up      History of anemia     treatred with iron      History of vitamin D deficiency      Left otitis media 2014    treated iw augmentin     Mental disorder 2014    anxiety                   Physical Exam:     All vitals stable  Patient Vitals for the past 12 hrs:   BP Temp Temp src Heart Rate Resp   03/22/20 0803 114/67 98.4  F (36.9  C) Oral 92 16   03/21/20 2358 120/72 97.6  F (36.4  C) Oral 78 16     Uterine fundus is firm, non-tender and at the level of the umbilicus  Ext NT     Johan Reyes MD  3/22/2020 8:03 AM

## 2020-03-22 NOTE — PLAN OF CARE
VSS. Ibuprofen given. Anxiety improved from evening shift after receiving medication. Rested well overnight. Encouraged to pump for baby in NICU. SO at bedside, supportive.

## 2020-12-27 ENCOUNTER — HEALTH MAINTENANCE LETTER (OUTPATIENT)
Age: 30
End: 2020-12-27

## 2021-04-24 ENCOUNTER — HEALTH MAINTENANCE LETTER (OUTPATIENT)
Age: 31
End: 2021-04-24

## 2021-10-09 ENCOUNTER — HEALTH MAINTENANCE LETTER (OUTPATIENT)
Age: 31
End: 2021-10-09

## 2022-04-14 NOTE — NURSING NOTE
"Chief Complaint   Patient presents with     Prenatal Care       Initial /58  Pulse 64  Wt 149 lb (67.6 kg)  LMP 2017  Breastfeeding? No  BMI 26.53 kg/m2 Estimated body mass index is 26.53 kg/(m^2) as calculated from the following:    Height as of 18: 5' 2.84\" (1.596 m).    Weight as of this encounter: 149 lb (67.6 kg).  BP completed using cuff size: regular        19w1d  + FM noted  - cramping or bleeding  + occas. Headache  - heartburn  Caitlin Johnson LPN                  " Retention Suture Text: Retention sutures were placed to support the closure and prevent dehiscence.

## 2022-05-21 ENCOUNTER — HEALTH MAINTENANCE LETTER (OUTPATIENT)
Age: 32
End: 2022-05-21

## 2022-09-17 ENCOUNTER — HEALTH MAINTENANCE LETTER (OUTPATIENT)
Age: 32
End: 2022-09-17

## 2023-04-09 NOTE — PLAN OF CARE
Data: Patient presented to [facility]: 3/20/2020  9:11 AM.  Reason for maternal/fetal assessment is uterine contractions. Patient reports contractions have gotten more intense since being discharged last evening.  Patient is a .  Prenatal record reviewed. Pregnancy  has been uncomplicated.  Gestational Age 38w6d. VSS. Fetal movement present. Patient denies leaking of vaginal fluid/rupture of membranes, pelvic pressure, nausea, vomiting, headache, visual disturbances, epigastric or URQ pain, significant edema. Support person is present.   Action: Verbal consent for EFM. Triage assessment completed. Bill of rights reviewed.  Response: Patient verbalized agreement with plan. Will contact Dr Ellen Summers with update and further orders.   None

## 2023-05-24 ENCOUNTER — OFFICE VISIT (OUTPATIENT)
Dept: PRIMARY CARE | Facility: CLINIC | Age: 33
End: 2023-05-24
Payer: COMMERCIAL

## 2023-05-24 VITALS
DIASTOLIC BLOOD PRESSURE: 64 MMHG | RESPIRATION RATE: 18 BRPM | OXYGEN SATURATION: 99 % | HEART RATE: 84 BPM | SYSTOLIC BLOOD PRESSURE: 108 MMHG | WEIGHT: 148.7 LBS | BODY MASS INDEX: 27.36 KG/M2 | TEMPERATURE: 98.2 F | HEIGHT: 62 IN

## 2023-05-24 DIAGNOSIS — B35.1 ONYCHOMYCOSIS: Primary | ICD-10-CM

## 2023-05-24 DIAGNOSIS — Z20.6 EXPOSURE TO HIV: ICD-10-CM

## 2023-05-24 PROCEDURE — 1036F TOBACCO NON-USER: CPT | Performed by: FAMILY MEDICINE

## 2023-05-24 PROCEDURE — 99203 OFFICE O/P NEW LOW 30 MIN: CPT | Performed by: FAMILY MEDICINE

## 2023-05-24 RX ORDER — EMTRICITABINE AND TENOFOVIR DISOPROXIL FUMARATE 200; 300 MG/1; MG/1
1 TABLET, FILM COATED ORAL
Qty: 30 TABLET | Refills: 2 | COMMUNITY
Start: 2023-05-20 | End: 2023-08-18

## 2023-05-24 RX ORDER — SERTRALINE HYDROCHLORIDE 50 MG/1
50 TABLET, FILM COATED ORAL
Qty: 30 TABLET | Refills: 0 | COMMUNITY
Start: 2023-05-10 | End: 2024-06-05

## 2023-05-24 NOTE — PROGRESS NOTES
"Subjective   Patient ID: Inga Sams is a 33 y.o. female who presents for Establish Care (Switching from [facility] to . Would like to discuss medications and have a look at her lg toenail on her rt foot).    HPI   Pt presents to establish care.  She recently had a miscarriage.  Doing ok during this time.    She is mostly presenting because of interest in obtaining care with new ID physician.  She was seeing ID at [facility] for management of PrEP secondary to partner's positive HIV status (noted in last year).  Pt has continuously tested negative.  She was cleared to attempt conception with her previous OB as well as ID physician.  She is considering changing all care to  including OB but not sure yet, would like to start with ID.  Tolerates PreP without any problems.    She currently is in 2 week wait for possible pregnancy after recent attempt.    Pt also has a fungal infection on her right great toe.  Going on for several years and bothersome.  Interested in seeing podiatry.    Review of Systems  Negative unless noted in HPI    Objective   /64 (BP Location: Left arm, Patient Position: Sitting)   Pulse 84   Temp 36.8 °C (98.2 °F) (Temporal)   Resp 18   Ht 1.575 m (5' 2\")   Wt 67.4 kg (148 lb 11.2 oz)   SpO2 99%   BMI 27.20 kg/m²     Physical Exam  Right first toenail is thickened and yellow discoloration noted  Assessment/Plan   Problem List Items Addressed This Visit          Musculoskeletal    Onychomycosis - Primary     Referral to podiatry provided         Relevant Orders    Referral to Podiatry       Other    Exposure to HIV     Provided several referrals for ID including  and  quality network  If difficulty obtaining appointment ok to call office for refills         Relevant Orders    Referral to Infectious Disease          "

## 2023-05-24 NOTE — ASSESSMENT & PLAN NOTE
Provided several referrals for ID including  and  quality network  If difficulty obtaining appointment ok to call office for refills

## 2023-06-03 ENCOUNTER — HEALTH MAINTENANCE LETTER (OUTPATIENT)
Age: 33
End: 2023-06-03

## 2023-07-18 LAB
ABO GROUP (TYPE) IN BLOOD: NORMAL
ANTIBODY SCREEN: NORMAL
ERYTHROCYTE DISTRIBUTION WIDTH (RATIO) BY AUTOMATED COUNT: 11.4 % (ref 11.5–14.5)
ERYTHROCYTE MEAN CORPUSCULAR HEMOGLOBIN CONCENTRATION (G/DL) BY AUTOMATED: 34.1 G/DL (ref 32–36)
ERYTHROCYTE MEAN CORPUSCULAR VOLUME (FL) BY AUTOMATED COUNT: 92 FL (ref 80–100)
ERYTHROCYTES (10*6/UL) IN BLOOD BY AUTOMATED COUNT: 4.22 X10E12/L (ref 4–5.2)
HEMATOCRIT (%) IN BLOOD BY AUTOMATED COUNT: 38.7 % (ref 36–46)
HEMOGLOBIN (G/DL) IN BLOOD: 13.2 G/DL (ref 12–16)
HEPATITIS B VIRUS SURFACE AG PRESENCE IN SERUM: NONREACTIVE
HEPATITIS C VIRUS AB PRESENCE IN SERUM: NONREACTIVE
HIV 1/ 2 AG/AB SCREEN: NONREACTIVE
LEUKOCYTES (10*3/UL) IN BLOOD BY AUTOMATED COUNT: 7.2 X10E9/L (ref 4.4–11.3)
NRBC (PER 100 WBCS) BY AUTOMATED COUNT: 0 /100 WBC (ref 0–0)
PLATELETS (10*3/UL) IN BLOOD AUTOMATED COUNT: 227 X10E9/L (ref 150–450)
REFLEX ADDED, ANEMIA PANEL: ABNORMAL
RH FACTOR: NORMAL
RUBELLA VIRUS IGG AB: POSITIVE
SYPHILIS TOTAL AB: NONREACTIVE

## 2023-07-20 LAB
CHLAMYDIA TRACH., AMPLIFIED: NEGATIVE
HEMOGLOBIN A2: 2.7 %
HEMOGLOBIN A: 96.6 %
HEMOGLOBIN F: 0.7 %
HEMOGLOBIN IDENTIFICATION INTERPRETATION: NORMAL
N. GONORRHEA, AMPLIFIED: NEGATIVE
PATH REVIEW-HGB IDENTIFICATION: NORMAL
TRICHOMONAS VAGINALIS: NEGATIVE
URINE CULTURE: NO GROWTH

## 2023-08-08 LAB
ANION GAP IN SER/PLAS: 9 MMOL/L (ref 10–20)
CALCIUM (MG/DL) IN SER/PLAS: 9.5 MG/DL (ref 8.6–10.6)
CARBON DIOXIDE, TOTAL (MMOL/L) IN SER/PLAS: 27 MMOL/L (ref 21–32)
CHLORIDE (MMOL/L) IN SER/PLAS: 103 MMOL/L (ref 98–107)
CREATININE (MG/DL) IN SER/PLAS: 0.52 MG/DL (ref 0.5–1.05)
GFR FEMALE: >90 ML/MIN/1.73M2
GLUCOSE (MG/DL) IN SER/PLAS: 79 MG/DL (ref 74–99)
POTASSIUM (MMOL/L) IN SER/PLAS: 4.4 MMOL/L (ref 3.5–5.3)
SODIUM (MMOL/L) IN SER/PLAS: 135 MMOL/L (ref 136–145)
UREA NITROGEN (MG/DL) IN SER/PLAS: 7 MG/DL (ref 6–23)

## 2023-08-24 LAB — LAB MOLECULAR CA TECHNICAL NOTES: NORMAL

## 2023-09-19 PROBLEM — Z87.59 HISTORY OF SPONTANEOUS ABORTION: Status: ACTIVE | Noted: 2023-09-19

## 2023-09-19 RX ORDER — EMTRICITABINE AND TENOFOVIR DISOPROXIL FUMARATE 100; 150 MG/1; MG/1
TABLET, FILM COATED ORAL
COMMUNITY
Start: 2023-07-18 | End: 2023-10-19 | Stop reason: ENTERED-IN-ERROR

## 2023-09-19 RX ORDER — EMTRICITABINE AND TENOFOVIR DISOPROXIL FUMARATE 200; 300 MG/1; MG/1
1 TABLET, FILM COATED ORAL DAILY
COMMUNITY
Start: 2023-07-18 | End: 2023-10-19 | Stop reason: SDUPTHER

## 2023-09-19 RX ORDER — ONDANSETRON 4 MG/1
4 TABLET, FILM COATED ORAL EVERY 8 HOURS PRN
COMMUNITY
Start: 2023-07-07 | End: 2024-02-23 | Stop reason: HOSPADM

## 2023-10-02 ENCOUNTER — ANCILLARY PROCEDURE (OUTPATIENT)
Dept: RADIOLOGY | Facility: CLINIC | Age: 33
End: 2023-10-02
Payer: COMMERCIAL

## 2023-10-02 DIAGNOSIS — Z36.89 SCREENING, ANTENATAL, FOR FETAL ANATOMIC SURVEY (HHS-HCC): ICD-10-CM

## 2023-10-02 PROCEDURE — 76805 OB US >/= 14 WKS SNGL FETUS: CPT

## 2023-10-02 PROCEDURE — 76811 OB US DETAILED SNGL FETUS: CPT | Performed by: OBSTETRICS & GYNECOLOGY

## 2023-10-19 ENCOUNTER — ROUTINE PRENATAL (OUTPATIENT)
Dept: OBSTETRICS AND GYNECOLOGY | Facility: CLINIC | Age: 33
End: 2023-10-19
Payer: COMMERCIAL

## 2023-10-19 VITALS — WEIGHT: 157 LBS | DIASTOLIC BLOOD PRESSURE: 72 MMHG | SYSTOLIC BLOOD PRESSURE: 118 MMHG | BODY MASS INDEX: 28.72 KG/M2

## 2023-10-19 DIAGNOSIS — Z3A.21 21 WEEKS GESTATION OF PREGNANCY (HHS-HCC): ICD-10-CM

## 2023-10-19 DIAGNOSIS — Z98.890 HISTORY OF MATERNAL CARDIAC SURGERY (HHS-HCC): ICD-10-CM

## 2023-10-19 DIAGNOSIS — Z34.82 PRENATAL CARE, SUBSEQUENT PREGNANCY IN SECOND TRIMESTER (HHS-HCC): Primary | ICD-10-CM

## 2023-10-19 DIAGNOSIS — O09.899 HISTORY OF MATERNAL CARDIAC SURGERY (HHS-HCC): ICD-10-CM

## 2023-10-19 DIAGNOSIS — Z20.6 EXPOSURE TO HIV: ICD-10-CM

## 2023-10-19 PROBLEM — O98.511 COVID-19 AFFECTING PREGNANCY IN FIRST TRIMESTER (HHS-HCC): Status: ACTIVE | Noted: 2023-10-19

## 2023-10-19 PROBLEM — F32.9 REACTIVE DEPRESSION: Status: ACTIVE | Noted: 2023-10-19

## 2023-10-19 PROBLEM — U07.1 COVID-19 AFFECTING PREGNANCY IN FIRST TRIMESTER (HHS-HCC): Status: ACTIVE | Noted: 2023-10-19

## 2023-10-19 PROCEDURE — 0501F PRENATAL FLOW SHEET: CPT | Performed by: OBSTETRICS & GYNECOLOGY

## 2023-10-19 RX ORDER — EMTRICITABINE AND TENOFOVIR DISOPROXIL FUMARATE 200; 300 MG/1; MG/1
1 TABLET, FILM COATED ORAL DAILY
Qty: 90 TABLET | Refills: 1 | Status: SHIPPED | OUTPATIENT
Start: 2023-10-19 | End: 2023-11-13

## 2023-10-19 NOTE — PROGRESS NOTES
Patient feeling well.   Anatomy USN ok, except for concern for apical VSD. Has fetal echo scheduled.   Has not made her cardiology appointment yet.   Discussed recommendation for serial growth USNs.   Needs refill on her Truvada. HIV, CMP ordered.     Problem List Items Addressed This Visit       Exposure to HIV    Overview      with HIV, On Truvada         Relevant Medications    emtricitabine-tenofovir, TDF, (Truvada) 200-300 mg tablet    Other Relevant Orders    HIV    Comprehensive Metabolic Panel    History of maternal cardiac surgery    Overview     Mesh implants for repair  Fetal echo needed- scheduled 11/13  Maternal cardiology consult ordered  Follow growth.           Prenatal care, subsequent pregnancy in second trimester - Primary    Overview     S/p rr cf DNA          Other Visit Diagnoses       21 weeks gestation of pregnancy

## 2023-10-24 ENCOUNTER — LAB (OUTPATIENT)
Dept: LAB | Facility: LAB | Age: 33
End: 2023-10-24
Payer: COMMERCIAL

## 2023-10-24 DIAGNOSIS — Z20.6 EXPOSURE TO HIV: ICD-10-CM

## 2023-10-24 LAB
ALBUMIN SERPL BCP-MCNC: 3.8 G/DL (ref 3.4–5)
ALP SERPL-CCNC: 61 U/L (ref 33–110)
ALT SERPL W P-5'-P-CCNC: 10 U/L (ref 7–45)
ANION GAP SERPL CALC-SCNC: 11 MMOL/L (ref 10–20)
AST SERPL W P-5'-P-CCNC: 15 U/L (ref 9–39)
BILIRUB SERPL-MCNC: 0.3 MG/DL (ref 0–1.2)
BUN SERPL-MCNC: 8 MG/DL (ref 6–23)
CALCIUM SERPL-MCNC: 8.4 MG/DL (ref 8.6–10.3)
CHLORIDE SERPL-SCNC: 101 MMOL/L (ref 98–107)
CO2 SERPL-SCNC: 26 MMOL/L (ref 21–32)
CREAT SERPL-MCNC: 0.46 MG/DL (ref 0.5–1.05)
GFR SERPL CREATININE-BSD FRML MDRD: >90 ML/MIN/1.73M*2
GLUCOSE SERPL-MCNC: 92 MG/DL (ref 74–99)
HIV 1+2 AB+HIV1 P24 AG SERPL QL IA: NONREACTIVE
POTASSIUM SERPL-SCNC: 4 MMOL/L (ref 3.5–5.3)
PROT SERPL-MCNC: 6.2 G/DL (ref 6.4–8.2)
SODIUM SERPL-SCNC: 134 MMOL/L (ref 136–145)

## 2023-10-24 PROCEDURE — 80053 COMPREHEN METABOLIC PANEL: CPT

## 2023-10-24 PROCEDURE — 36415 COLL VENOUS BLD VENIPUNCTURE: CPT

## 2023-10-24 PROCEDURE — 87389 HIV-1 AG W/HIV-1&-2 AB AG IA: CPT

## 2023-11-13 ENCOUNTER — OFFICE VISIT (OUTPATIENT)
Dept: PEDIATRIC CARDIOLOGY | Facility: HOSPITAL | Age: 33
End: 2023-11-13
Payer: COMMERCIAL

## 2023-11-13 ENCOUNTER — HOSPITAL ENCOUNTER (OUTPATIENT)
Dept: PEDIATRIC CARDIOLOGY | Facility: HOSPITAL | Age: 33
Discharge: HOME | End: 2023-11-13
Payer: COMMERCIAL

## 2023-11-13 VITALS
SYSTOLIC BLOOD PRESSURE: 119 MMHG | HEART RATE: 80 BPM | BODY MASS INDEX: 28.63 KG/M2 | WEIGHT: 161.6 LBS | OXYGEN SATURATION: 99 % | HEIGHT: 63 IN | DIASTOLIC BLOOD PRESSURE: 77 MMHG

## 2023-11-13 DIAGNOSIS — O09.899 HISTORY OF MATERNAL CARDIAC SURGERY (HHS-HCC): ICD-10-CM

## 2023-11-13 DIAGNOSIS — Z98.890 HISTORY OF MATERNAL CARDIAC SURGERY (HHS-HCC): ICD-10-CM

## 2023-11-13 DIAGNOSIS — O35.8XX0 MATERNAL CARE FOR OTHER (SUSPECTED) FETAL ABNORMALITY AND DAMAGE, NOT APPLICABLE OR UNSPECIFIED (HHS-HCC): ICD-10-CM

## 2023-11-13 DIAGNOSIS — Z20.6 EXPOSURE TO HIV: ICD-10-CM

## 2023-11-13 DIAGNOSIS — O35.9XX0 SUSPECTED FETAL ABNORMALITY AFFECTING MANAGEMENT OF MOTHER, SINGLE OR UNSPECIFIED FETUS (HHS-HCC): Primary | ICD-10-CM

## 2023-11-13 PROCEDURE — 1036F TOBACCO NON-USER: CPT | Performed by: PEDIATRICS

## 2023-11-13 PROCEDURE — 93325 DOPPLER ECHO COLOR FLOW MAPG: CPT | Performed by: PEDIATRICS

## 2023-11-13 PROCEDURE — 99215 OFFICE O/P EST HI 40 MIN: CPT | Performed by: PEDIATRICS

## 2023-11-13 PROCEDURE — 76827 ECHO EXAM OF FETAL HEART: CPT | Performed by: PEDIATRICS

## 2023-11-13 PROCEDURE — 76827 ECHO EXAM OF FETAL HEART: CPT

## 2023-11-13 PROCEDURE — 99205 OFFICE O/P NEW HI 60 MIN: CPT | Performed by: PEDIATRICS

## 2023-11-13 RX ORDER — EMTRICITABINE AND TENOFOVIR DISOPROXIL FUMARATE 200; 300 MG/1; MG/1
1 TABLET, FILM COATED ORAL DAILY
Qty: 30 TABLET | Refills: 2 | Status: SHIPPED | OUTPATIENT
Start: 2023-11-13 | End: 2024-03-25

## 2023-11-13 NOTE — PATIENT INSTRUCTIONS
On fetal echocardiogram today we didn't see evidence of a hole between the pumping chambers - a muscular ventricular septal defect (VSD), but this was seen on her obstetric ultrasound.  See the AHA handout for additional information.  Holes this small are ones that I would not expect to cause significant symptoms or require surgery, however, we will be able to tell this with more certainty after birth.  Of note, your fetus´ heart is otherwise structurally normal and there was normal heart function and rhythm on your scan today.  We will communicate these results with your obstetrician.    It was a pleasure to see you today.  We do not need to see you back for routine follow-up during the remainder of your pregnancy.  However, we would be happy to see you back if new issues or concerns arise.  After birth we will plan to see your baby in the first 1-2 weeks of life to perform an echocardiogram to evaluate the VSD.  If you have any questions or concerns regarding this evaluation, please do not hesitate to contact me.    Kaila Harris MD   of Pediatrics  Division of Pediatric Cardiology  [facility] [place] [address]  Phone: [phone]  Fax: [phone]  e-mail: [email or web address]

## 2023-11-13 NOTE — LETTER
2023     Mesha Ren MD  [address]  [facility]  [address], [address]  [place] [id number]    Patient: Inga Sams   YOB: 1990   Date of Visit: 2023       Dear Dr. Mesha Ren MD:    Thank you for referring Inga Sams to me for evaluation. Below are my notes for this consultation.  If you have questions, please do not hesitate to call me. I look forward to following your patient along with you.       Sincerely,     Kaila Harris MD      CC: MD Lizz Dukes, APRN-CNM, [facility]  Eloise Restrepo, APRN-CNP  ______________________________________________________________________________________         [facility]  [facility]  Division of Pediatric Cardiology  [facility]  [address] [place] [address], [address] [place] [address]  Tel: [phone], Fax [phone]     Obstetrician: Dr. Marima Stover    Inga Sams was seen at the request of Dr. Mesha Ren for concern for a ventricular septal defect on obstetric ultrasound and a maternal history of congenital heart disease.  Records were reviewed, and a summary of those records is integrated within the history of present illness.  A report with my findings is being sent via written or electronic means to the referring physician with my recommendations    Accompanied by:    History obtained from:  patient    History of Presentation   History of Present Illness:   Inga Sams is a 33 y.o. female presenting for initial prenatal cardiology consultation and fetal echocardiogram for concern for a ventricular septal defect on obstetric ultrasound and a maternal history of congenital heart disease.  She is  and approximately 24 5/7 weeks pregnant (Patient's last menstrual period was 2023., Estimated Date of Delivery: 24)  with a female fetus.  Her obstetric history is significant for two prior full term deliveries and one miscarriage.  Complications of her current pregnancy include HIV positivity in her partner.  Ms. Inga Sams had a normal first trimester screen.  Her level 2 obstetric ultrasound for anatomy was performed at 18 5/7 weeks gestational age and was normal.  She has not undergone cell free fetal DNA testing.  She has not had invasive genetic testing such as amniocentesis or chorionic villous sampling.  This pregnancy was not the result of in vitro fertilization.  She was not using potentially teratogenic medication at the time of conception.  There have been no other complications of this pregnancy.  Ms. Inga Sams plans to deliver her baby at [facility].    Ms. Inga Sams feels well today.  She denies any shortness of breath, abdominal cramping, contractions, bleeding, or swelling of the extremities.  She notes frequent fetal movement.        Medical History     Medical Conditions:  Patient Active Problem List   Diagnosis   • Onychomycosis   • Exposure to HIV   • History of spontaneous    • History of maternal cardiac surgery   • COVID-19 affecting pregnancy in first trimester   • Reactive depression   • Prenatal care, subsequent pregnancy in second trimester     Past Surgeries:  Past Surgical History:   Procedure Laterality Date   • ASD REPAIR       Current Outpatient Medications   Medication Instructions   • emtricitabine-tenofovir, TDF, (Truvada) 200-300 mg tablet 1 tablet, oral, Daily   • ondansetron (ZOFRAN) 4 mg, oral, Every 8 hours PRN   • PNV no.95/ferrous fum/folic ac (PRENATAL ORAL) oral   • sertraline (ZOLOFT) 50 mg, oral, Daily RT      Allergies:  Patient has no known allergies.    Social History:  Patient lives with   and her 2 children .    Occupation: Teacher  Smoking: None  Alcohol: None  Drug Use: None  She wears a seatbelt while in the car. She denies any verbal,  "sexual or physical abuse.     Cardiac Family History (for patient and father of baby):  There is no history of congenital heart disease.  There is no history of early sudden/unexplained death including SIDS and drowning.  There is no history of cardiomyopathy of any type or heart transplant.  There is no history of arrhythmias/pacemaker/defibrillator or arrhythmia syndromes, including Long QT syndrome, Tatiana-Parkinson-White syndrome or Brugada syndrome.  There is no history of heart attack or stroke before the age of 55 years in a close family member.  There is no history of Marfan syndrome or aortic aneurysm.  There is no history of deafness.  There is no history of syncope/fainting.  There is no history of high blood pressure or high cholesterol.  There is no history of DiGeorge Syndrome (22q11).    Physical Examination     /77 (BP Location: Right arm, Patient Position: Sitting, BP Cuff Size: Adult)   Pulse 80   Ht 1.594 m (5' 2.76\")   Wt 73.3 kg (161 lb 9.6 oz)   LMP 05/24/2023   SpO2 99%   BMI 28.85 kg/m²     General: Alert, well-appearing and in no acute distress.    Abdomen: Soft, nontender, not distended. Gravid.  Extremities: No swelling or edema.  Neurologic / Psychiatric: Grossly intact without focal deficits.  Appropriate demeanor.      Results     Fetal Echocardiogram:    I ordered and interpreted a transabdominal fetal echocardiogram.  I performed a portion of the study myself.  The complete report is available under separate cover.  The results are summarized as follows:    Image quality: Good  Cardiac situs: Cardiac mass in the left chest.  Left ventricular apex points leftward.  Segmental anatomy: Atrio-ventricular concordance.  Ventriculo-arterial concordance.  Normally-related great arteries.  Superior vena cava: Normal connection to the right atrium.  Inferior vena cava: Normal connection to the right atrium.  Pulmonary veins: At least one pulmonary vein connects normally to the left " atrium.  Atrial septum: Patent foramen ovale, with flap valve bowing into the left atrium.  Tricuspid valve: Structurally normal.  No obvious stenosis or insufficiency.  Mitral valve: Structurally normal.  No obvious stenosis or insufficiency.  Right ventricle: Normal ventricular size, wall thickness, and systolic function (qualitative).  Left ventricle: Normal ventricular size, wall thickness, and systolic function (qualitative).  Interventricular septum: Possible tiny apical muscular ventricular septal defect.   Aortic valve: Structurally normal.  No obvious stenosis or insufficiency.  Pulmonary valve: Structurally normal.  No obvious stenosis or insufficiency.  Pulmonary artery: Normal in size.  Ductal arch: Patent with right to left shunting.  Aortic arch: Left-sided.  Patent.  Pericardial effusion: None.  Umbilical arteries: Two umbilical arteries.  Normal arterial flow pattern.  Umbilical vein: Normal venous flow pattern.  Electrophysiology: Normal fetal heart rate and rhythm.  Normal mechanical MI interval.      Assessment & Plan   Assessment:  Ms. Inga Sams is a 33 y.o. female who is  and currently at 24 5/7 weeks gestational age with a female fetus.  A fetal echocardiogram was performed today for concern for a ventricular septal defect on obstetric ultrasound and maternal history of congenital heart disease.     Ms. Sams's fetus has an possible tiny isolated muscular ventricular septal defect (VSD).  This is a very common congenital heart defect.  Although it is difficult to predict based on fetal echocardiogram, I feel that this VSD is likely close spontaneously because of its size and location.  It is unlikely to cause any symptoms.  This will be determined by the functional size of the VSD which will be better assessed on post- echocardiogram.  All of the above details were discussed at length with Ms. Sams and their questions were answered.       I discussed limitations of the  technology of fetal echocardiography with Ms. Inga Sams in detail.  I explained that fetal echocardiography cannot exclude all forms of congenital heart disease.  Fetal echocardiography may be insensitive to some defects of atrial and ventricular septation, minor valvular abnormalities, partial anomalous pulmonary venous return, and coarctation of the aorta.  In addition, normal fetal echocardiogram does not ensure that the fetal ductus arteriosus or foramen ovale will close.        Recommendations:  No changes to prenatal care.    Further fetal cardiac imaging is not required at this time.  We would be happy to see Ms. Inga Sams in the future if new concerns arise.    Triage code 1:   No changes to delivery planning.  Delivery per obstetrics at patient´s preferred hospital.  Standard  care per  team.  A non-urgent pediatric cardiology consult should be performed prior to hospital discharge or as an outpatient in 1-2 weeks if delivering at an outside hospital.  Can be performed sooner if there are any clinical concerns.      I spent greater than 80 minutes in performance of this consultation, of which greater than 50% was related to coordination of care or counseling.    This assessment and plan, in addition to the results of relevant testing were explained to Inga Sams. All questions were answered and understanding was demonstrated.    It was a pleasure to see Ms. Inga Sams today.  If you have any questions or concerns regarding this evaluation, do not hesitate to contact me.      Kaila Harris MD, FACC, FAAP   of Pediatrics  Division of Pediatric Cardiology  [place], [place] [address]  Phone: [phone]  Fax: [phone]  e-mail: [email or web address]

## 2023-11-16 ENCOUNTER — ROUTINE PRENATAL (OUTPATIENT)
Dept: OBSTETRICS AND GYNECOLOGY | Facility: CLINIC | Age: 33
End: 2023-11-16
Payer: COMMERCIAL

## 2023-11-16 VITALS — SYSTOLIC BLOOD PRESSURE: 112 MMHG | DIASTOLIC BLOOD PRESSURE: 70 MMHG | WEIGHT: 162 LBS | BODY MASS INDEX: 28.92 KG/M2

## 2023-11-16 DIAGNOSIS — Z3A.25 25 WEEKS GESTATION OF PREGNANCY (HHS-HCC): ICD-10-CM

## 2023-11-16 DIAGNOSIS — O09.899 HISTORY OF MATERNAL CARDIAC SURGERY (HHS-HCC): ICD-10-CM

## 2023-11-16 DIAGNOSIS — Z98.890 HISTORY OF MATERNAL CARDIAC SURGERY (HHS-HCC): ICD-10-CM

## 2023-11-16 DIAGNOSIS — Z34.82 PRENATAL CARE, SUBSEQUENT PREGNANCY IN SECOND TRIMESTER (HHS-HCC): Primary | ICD-10-CM

## 2023-11-16 PROCEDURE — 0501F PRENATAL FLOW SHEET: CPT | Performed by: OBSTETRICS & GYNECOLOGY

## 2023-11-16 NOTE — PROGRESS NOTES
25 week Ob  Having  a lot of fatigue, aches and pains of pregnancy.   Vomiting still twice/week.   Saw pediatric cardiology-no major concerns, but plan for follow-up echo after delivery.   Concerns about breastfeeding with  being HIV positive.   Has not done her own cardiology appointment.   Discussed COVID booster, vaccines.

## 2023-11-16 NOTE — PROGRESS NOTES
[facility]  [facility]  Division of Pediatric Cardiology  [facility]  [address], [address], [place] [address]  Tel: [phone], Fax [phone]     Obstetrician: Dr. Mariam Stover    Inga Sams was seen at the request of Dr. Mesha Ren for concern for a ventricular septal defect on obstetric ultrasound and a maternal history of congenital heart disease.  Records were reviewed, and a summary of those records is integrated within the history of present illness.  A report with my findings is being sent via written or electronic means to the referring physician with my recommendations    Accompanied by:    History obtained from:  patient    History of Presentation   History of Present Illness:   Inga Sams is a 33 y.o. female presenting for initial prenatal cardiology consultation and fetal echocardiogram for concern for a ventricular septal defect on obstetric ultrasound and a maternal history of congenital heart disease.  She is  and approximately 24 5/7 weeks pregnant (Patient's last menstrual period was 2023., Estimated Date of Delivery: 24) with a female fetus.  Her obstetric history is significant for two prior full term deliveries and one miscarriage.  Complications of her current pregnancy include HIV positivity in her partner.  Ms. Inga Sams had a normal first trimester screen.  Her level 2 obstetric ultrasound for anatomy was performed at 18 5/7 weeks gestational age and was normal.  She has not undergone cell free fetal DNA testing.  She has not had invasive genetic testing such as amniocentesis or chorionic villous sampling.  This pregnancy was not the result of in vitro fertilization.  She was not using potentially teratogenic medication at the time of conception.  There have been no other complications of this pregnancy.   Ms. Inga Sams plans to deliver her baby at [facility].    Ms. Inga Sams feels well today.  She denies any shortness of breath, abdominal cramping, contractions, bleeding, or swelling of the extremities.  She notes frequent fetal movement.        Medical History     Medical Conditions:  Patient Active Problem List   Diagnosis    Onychomycosis    Exposure to HIV    History of spontaneous     History of maternal cardiac surgery    COVID-19 affecting pregnancy in first trimester    Reactive depression    Prenatal care, subsequent pregnancy in second trimester     Past Surgeries:  Past Surgical History:   Procedure Laterality Date    ASD REPAIR       Current Outpatient Medications   Medication Instructions    emtricitabine-tenofovir, TDF, (Truvada) 200-300 mg tablet 1 tablet, oral, Daily    ondansetron (ZOFRAN) 4 mg, oral, Every 8 hours PRN    PNV no.95/ferrous fum/folic ac (PRENATAL ORAL) oral    sertraline (ZOLOFT) 50 mg, oral, Daily RT      Allergies:  Patient has no known allergies.    Social History:  Patient lives with   and her 2 children .    Occupation: Teacher  Smoking: None  Alcohol: None  Drug Use: None  She wears a seatbelt while in the car. She denies any verbal, sexual or physical abuse.     Cardiac Family History (for patient and father of baby):  There is no history of congenital heart disease.  There is no history of early sudden/unexplained death including SIDS and drowning.  There is no history of cardiomyopathy of any type or heart transplant.  There is no history of arrhythmias/pacemaker/defibrillator or arrhythmia syndromes, including Long QT syndrome, Tatiana-Parkinson-White syndrome or Brugada syndrome.  There is no history of heart attack or stroke before the age of 55 years in a close family member.  There is no history of Marfan syndrome or aortic aneurysm.  There is no history of deafness.  There is no history of syncope/fainting.  There is no history of  "high blood pressure or high cholesterol.  There is no history of DiGeorge Syndrome (22q11).    Physical Examination     /77 (BP Location: Right arm, Patient Position: Sitting, BP Cuff Size: Adult)   Pulse 80   Ht 1.594 m (5' 2.76\")   Wt 73.3 kg (161 lb 9.6 oz)   LMP 05/24/2023   SpO2 99%   BMI 28.85 kg/m²     General: Alert, well-appearing and in no acute distress.    Abdomen: Soft, nontender, not distended. Gravid.  Extremities: No swelling or edema.  Neurologic / Psychiatric: Grossly intact without focal deficits.  Appropriate demeanor.      Results     Fetal Echocardiogram:    I ordered and interpreted a transabdominal fetal echocardiogram.  I performed a portion of the study myself.  The complete report is available under separate cover.  The results are summarized as follows:    Image quality: Good  Cardiac situs: Cardiac mass in the left chest.  Left ventricular apex points leftward.  Segmental anatomy: Atrio-ventricular concordance.  Ventriculo-arterial concordance.  Normally-related great arteries.  Superior vena cava: Normal connection to the right atrium.  Inferior vena cava: Normal connection to the right atrium.  Pulmonary veins: At least one pulmonary vein connects normally to the left atrium.  Atrial septum: Patent foramen ovale, with flap valve bowing into the left atrium.  Tricuspid valve: Structurally normal.  No obvious stenosis or insufficiency.  Mitral valve: Structurally normal.  No obvious stenosis or insufficiency.  Right ventricle: Normal ventricular size, wall thickness, and systolic function (qualitative).  Left ventricle: Normal ventricular size, wall thickness, and systolic function (qualitative).  Interventricular septum: Possible tiny apical muscular ventricular septal defect.   Aortic valve: Structurally normal.  No obvious stenosis or insufficiency.  Pulmonary valve: Structurally normal.  No obvious stenosis or insufficiency.  Pulmonary artery: Normal in size.  Ductal " arch: Patent with right to left shunting.  Aortic arch: Left-sided.  Patent.  Pericardial effusion: None.  Umbilical arteries: Two umbilical arteries.  Normal arterial flow pattern.  Umbilical vein: Normal venous flow pattern.  Electrophysiology: Normal fetal heart rate and rhythm.  Normal mechanical DC interval.      Assessment & Plan   Assessment:  Ms. Inga Sams is a 33 y.o. female who is  and currently at 24 5/7 weeks gestational age with a female fetus.  A fetal echocardiogram was performed today for concern for a ventricular septal defect on obstetric ultrasound and maternal history of congenital heart disease.     Ms. Sams's fetus has an possible tiny isolated muscular ventricular septal defect (VSD).  This is a very common congenital heart defect.  Although it is difficult to predict based on fetal echocardiogram, I feel that this VSD is likely close spontaneously because of its size and location.  It is unlikely to cause any symptoms.  This will be determined by the functional size of the VSD which will be better assessed on post- echocardiogram.  All of the above details were discussed at length with Ms. Sams and their questions were answered.       I discussed limitations of the technology of fetal echocardiography with Ms. Inga Sams in detail.  I explained that fetal echocardiography cannot exclude all forms of congenital heart disease.  Fetal echocardiography may be insensitive to some defects of atrial and ventricular septation, minor valvular abnormalities, partial anomalous pulmonary venous return, and coarctation of the aorta.  In addition, normal fetal echocardiogram does not ensure that the fetal ductus arteriosus or foramen ovale will close.        Recommendations:  No changes to prenatal care.    Further fetal cardiac imaging is not required at this time.  We would be happy to see Ms. Inga Sams in the future if new concerns arise.    Triage code 1:   No changes to  delivery planning.  Delivery per obstetrics at patient´s preferred hospital.  Standard  care per  team.  A non-urgent pediatric cardiology consult should be performed prior to hospital discharge or as an outpatient in 1-2 weeks if delivering at an outside hospital.  Can be performed sooner if there are any clinical concerns.      I spent greater than 80 minutes in performance of this consultation, of which greater than 50% was related to coordination of care or counseling.    This assessment and plan, in addition to the results of relevant testing were explained to Inga Sams. All questions were answered and understanding was demonstrated.    It was a pleasure to see Ms. Inga Sams today.  If you have any questions or concerns regarding this evaluation, do not hesitate to contact me.      Kaila Harris MD, FACC, FAAP   of Pediatrics  Division of Pediatric Cardiology  [place] [address]  Phone: [phone]  Fax: [phone]  e-mail: [email or web address]

## 2023-12-07 ENCOUNTER — ROUTINE PRENATAL (OUTPATIENT)
Dept: OBSTETRICS AND GYNECOLOGY | Facility: CLINIC | Age: 33
End: 2023-12-07
Payer: COMMERCIAL

## 2023-12-07 VITALS — DIASTOLIC BLOOD PRESSURE: 64 MMHG | BODY MASS INDEX: 29.46 KG/M2 | WEIGHT: 165 LBS | SYSTOLIC BLOOD PRESSURE: 118 MMHG

## 2023-12-07 DIAGNOSIS — O09.899 HISTORY OF MATERNAL CARDIAC SURGERY (HHS-HCC): ICD-10-CM

## 2023-12-07 DIAGNOSIS — Z34.82 PRENATAL CARE, SUBSEQUENT PREGNANCY IN SECOND TRIMESTER (HHS-HCC): ICD-10-CM

## 2023-12-07 DIAGNOSIS — R21 RASH OF BOTH HANDS: ICD-10-CM

## 2023-12-07 DIAGNOSIS — O35.BXX1 ANOMALY OF HEART OF FETUS AFFECTING PREGNANCY, ANTEPARTUM, FETUS 1 OF MULTIPLE GESTATION (HHS-HCC): Primary | ICD-10-CM

## 2023-12-07 DIAGNOSIS — Z98.890 HISTORY OF MATERNAL CARDIAC SURGERY (HHS-HCC): ICD-10-CM

## 2023-12-07 PROBLEM — O35.BXX0 FETAL CARDIAC ANOMALY AFFECTING PREGNANCY, ANTEPARTUM (HHS-HCC): Status: ACTIVE | Noted: 2023-12-07

## 2023-12-07 LAB
ERYTHROCYTE [DISTWIDTH] IN BLOOD BY AUTOMATED COUNT: 12 % (ref 11.5–14.5)
GLUCOSE 1H P 50 G GLC PO SERPL-MCNC: 92 MG/DL
HCT VFR BLD AUTO: 36.5 % (ref 36–46)
HGB BLD-MCNC: 12.5 G/DL (ref 12–16)
MCH RBC QN AUTO: 32.1 PG (ref 26–34)
MCHC RBC AUTO-ENTMCNC: 34.2 G/DL (ref 32–36)
MCV RBC AUTO: 94 FL (ref 80–100)
NRBC BLD-RTO: 0 /100 WBCS (ref 0–0)
PLATELET # BLD AUTO: 231 X10*3/UL (ref 150–450)
RBC # BLD AUTO: 3.9 X10*6/UL (ref 4–5.2)
REFLEX ADDED, ANEMIA PANEL: NORMAL
WBC # BLD AUTO: 8.5 X10*3/UL (ref 4.4–11.3)

## 2023-12-07 PROCEDURE — 90715 TDAP VACCINE 7 YRS/> IM: CPT | Performed by: OBSTETRICS & GYNECOLOGY

## 2023-12-07 PROCEDURE — 36415 COLL VENOUS BLD VENIPUNCTURE: CPT

## 2023-12-07 PROCEDURE — 0501F PRENATAL FLOW SHEET: CPT | Performed by: OBSTETRICS & GYNECOLOGY

## 2023-12-07 PROCEDURE — 85027 COMPLETE CBC AUTOMATED: CPT

## 2023-12-07 PROCEDURE — 86780 TREPONEMA PALLIDUM: CPT

## 2023-12-07 PROCEDURE — 90471 IMMUNIZATION ADMIN: CPT | Performed by: OBSTETRICS & GYNECOLOGY

## 2023-12-07 PROCEDURE — 82947 ASSAY GLUCOSE BLOOD QUANT: CPT

## 2023-12-07 NOTE — PROGRESS NOTES
28-1 week Ob visit.   No complaints.   Re-ordered cardiac echo.   Rash on her hands- red, chapped appearance.   Glucola today.     Problem List Items Addressed This Visit       History of maternal cardiac surgery    Overview     Mesh implants for repair  Fetal echo needed- scheduled   Maternal cardiology consult ordered  Follow growth.         Relevant Orders    Transthoracic Echo (TTE) Complete    Prenatal care, subsequent pregnancy in second trimester    Overview     S/p rr cf DNA         Fetal cardiac anomaly affecting pregnancy, antepartum - Primary    Overview     Possible tiny apical muscular ventricular septal defect.   code:1: non-urgent peds cardiology consult prior to discharge or within 1-2 weeks if delivered at an outside hospital  Per peds cardiology:  No changes to prenatal care.   Further fetal cardiac imaging is not required at this time.  We would be happy to see Ms. Inga Sams in the future if new concerns arise.     Triage code 1:   No changes to delivery planning.  Delivery per obstetrics at patient´s preferred hospital.  Standard  care per  team.  A non-urgent pediatric cardiology consult should be performed prior to hospital discharge or as an outpatient in 1-2 weeks if delivering at an outside hospital.  Can be performed sooner if there are any clinical concerns.            Other Visit Diagnoses       Rash of both hands        Relevant Orders    Referral to Dermatology

## 2023-12-08 LAB — T PALLIDUM AB SER QL: NONREACTIVE

## 2023-12-21 ENCOUNTER — ROUTINE PRENATAL (OUTPATIENT)
Dept: OBSTETRICS AND GYNECOLOGY | Facility: CLINIC | Age: 33
End: 2023-12-21
Payer: COMMERCIAL

## 2023-12-21 VITALS — BODY MASS INDEX: 29.6 KG/M2 | SYSTOLIC BLOOD PRESSURE: 130 MMHG | WEIGHT: 165.8 LBS | DIASTOLIC BLOOD PRESSURE: 76 MMHG

## 2023-12-21 DIAGNOSIS — Z3A.30 30 WEEKS GESTATION OF PREGNANCY (HHS-HCC): Primary | ICD-10-CM

## 2023-12-21 PROCEDURE — 0501F PRENATAL FLOW SHEET: CPT

## 2023-12-21 NOTE — PROGRESS NOTES
Assessment/Plan   Diagnoses and all orders for this visit:  30 weeks gestation of pregnancy      Has Echo scheduled for 23   To continue PNC with Dr. Roach.   Reviewed s/sx of PTL, warning signs, fetal movement counts, and when to call provider  Follow up in 2 week for a routine prenatal visit.    Yasmeen Childs, SARAH-FOREST    Subjective     Inga Sams is a 33 y.o.  at 30w1d with a working estimated date of delivery of 2024, by Last Menstrual Period who presents for a routine prenatal visit. She denies vaginal bleeding, leakage of fluid, decreased fetal movements, or contractions.  Pt endorses no questions or concerns.     Her pregnancy is complicated by:  gangsted  Problems (from 10/19/23 to present)       No problems associated with this episode.             Objective   Physical Exam:   Weight: 75.2 kg (165 lb 12.8 oz)  Expected Total Weight Gain: Could not be calculated   Pregravid BMI: Could not be calculated  BP: 130/76  Fetal Heart Rate: 145 Fundal Height (cm): 31 cm

## 2023-12-27 ENCOUNTER — HOSPITAL ENCOUNTER (OUTPATIENT)
Dept: CARDIOLOGY | Facility: HOSPITAL | Age: 33
Discharge: HOME | End: 2023-12-27
Payer: COMMERCIAL

## 2023-12-27 DIAGNOSIS — Z98.890 HISTORY OF MATERNAL CARDIAC SURGERY (HHS-HCC): ICD-10-CM

## 2023-12-27 DIAGNOSIS — Z01.89 ENCOUNTER FOR OTHER SPECIFIED SPECIAL EXAMINATIONS: ICD-10-CM

## 2023-12-27 DIAGNOSIS — O09.899 HISTORY OF MATERNAL CARDIAC SURGERY (HHS-HCC): ICD-10-CM

## 2023-12-27 LAB
AORTIC VALVE MEAN GRADIENT: 3.8
AORTIC VALVE PEAK VELOCITY: 1.37
AV PEAK GRADIENT: 7.5
AVA (PEAK VEL): 2.21
AVA (VTI): 1.94
EJECTION FRACTION APICAL 4 CHAMBER: 61.9
EJECTION FRACTION: 59
LEFT ATRIUM VOLUME AREA LENGTH INDEX BSA: 23.3
LEFT VENTRICLE INTERNAL DIMENSION DIASTOLE: 4.13 (ref 3.5–6)
LEFT VENTRICULAR OUTFLOW TRACT DIAMETER: 1.87
MITRAL VALVE E/A RATIO: 1.36
RIGHT VENTRICLE PEAK SYSTOLIC PRESSURE: 17.4
TRICUSPID ANNULAR PLANE SYSTOLIC EXCURSION: 2.5

## 2023-12-27 PROCEDURE — 93306 TTE W/DOPPLER COMPLETE: CPT | Performed by: STUDENT IN AN ORGANIZED HEALTH CARE EDUCATION/TRAINING PROGRAM

## 2023-12-27 PROCEDURE — 93306 TTE W/DOPPLER COMPLETE: CPT

## 2023-12-28 ENCOUNTER — TELEPHONE (OUTPATIENT)
Dept: OBSTETRICS AND GYNECOLOGY | Facility: CLINIC | Age: 33
End: 2023-12-28
Payer: COMMERCIAL

## 2023-12-28 NOTE — TELEPHONE ENCOUNTER
----- Message from Stephanie Hussein RN sent at 12/27/2023  2:42 PM EST -----    ----- Message -----  From: Mariam Stover MD  Sent: 12/27/2023   2:27 PM EST  To: [facility] Clinical Support Staff    Normal results, excellent ejection fraction

## 2024-01-04 ENCOUNTER — LAB (OUTPATIENT)
Dept: LAB | Facility: LAB | Age: 34
End: 2024-01-04
Payer: COMMERCIAL

## 2024-01-04 ENCOUNTER — ROUTINE PRENATAL (OUTPATIENT)
Dept: OBSTETRICS AND GYNECOLOGY | Facility: CLINIC | Age: 34
End: 2024-01-04
Payer: COMMERCIAL

## 2024-01-04 VITALS — BODY MASS INDEX: 29.99 KG/M2 | DIASTOLIC BLOOD PRESSURE: 66 MMHG | WEIGHT: 168 LBS | SYSTOLIC BLOOD PRESSURE: 104 MMHG

## 2024-01-04 DIAGNOSIS — Z98.890 HISTORY OF MATERNAL CARDIAC SURGERY (HHS-HCC): ICD-10-CM

## 2024-01-04 DIAGNOSIS — Z20.6 EXPOSURE TO HIV: ICD-10-CM

## 2024-01-04 DIAGNOSIS — O09.899 HISTORY OF MATERNAL CARDIAC SURGERY (HHS-HCC): ICD-10-CM

## 2024-01-04 DIAGNOSIS — O35.BXX1 ANOMALY OF HEART OF FETUS AFFECTING PREGNANCY, ANTEPARTUM, FETUS 1 OF MULTIPLE GESTATION (HHS-HCC): ICD-10-CM

## 2024-01-04 DIAGNOSIS — Z34.83 PRENATAL CARE, SUBSEQUENT PREGNANCY IN THIRD TRIMESTER (HHS-HCC): Primary | ICD-10-CM

## 2024-01-04 LAB — HIV 1+2 AB+HIV1 P24 AG SERPL QL IA: NONREACTIVE

## 2024-01-04 PROCEDURE — 0501F PRENATAL FLOW SHEET: CPT | Performed by: OBSTETRICS & GYNECOLOGY

## 2024-01-04 PROCEDURE — 87389 HIV-1 AG W/HIV-1&-2 AB AG IA: CPT

## 2024-01-04 PROCEDURE — 36415 COLL VENOUS BLD VENIPUNCTURE: CPT

## 2024-01-04 NOTE — PROGRESS NOTES
32.1 week Ob visit.   HIV exposure, on Truvada. Check HIV today.   Received Tdap, COVID recently. Discussed RSV.  Experiencing more back pain. Some increase in B-H. Seeing a chiropractor and doing stretches.     Problem List Items Addressed This Visit       Exposure to HIV    Overview      with HIV, On Truvada         Relevant Orders    HIV 1/2 Antigen/Antibody Screen with Reflex to Confirmation    History of maternal cardiac surgery    Overview     Mesh implants for repair  Fetal echo needed- scheduled   Maternal cardiology consult ordered- normal maternal echo EF 55-60%  Follow growth.         Prenatal care, subsequent pregnancy in third trimester - Primary    Overview     S/p rr cf DNA         Fetal cardiac anomaly affecting pregnancy, antepartum    Overview     Possible tiny apical muscular ventricular septal defect.   code:1: non-urgent peds cardiology consult prior to discharge or within 1-2 weeks if delivered at an outside hospital  Per peds cardiology:  No changes to prenatal care.   Further fetal cardiac imaging is not required at this time.  We would be happy to see Ms. Inga Sams in the future if new concerns arise.     Triage code 1:   No changes to delivery planning.  Delivery per obstetrics at patient´s preferred hospital.  Standard  care per  team.  A non-urgent pediatric cardiology consult should be performed prior to hospital discharge or as an outpatient in 1-2 weeks if delivering at an outside hospital.  Can be performed sooner if there are any clinical concerns.

## 2024-01-08 ENCOUNTER — DOCUMENTATION (OUTPATIENT)
Dept: OBSTETRICS AND GYNECOLOGY | Facility: CLINIC | Age: 34
End: 2024-01-08

## 2024-01-08 ENCOUNTER — TELEPHONE (OUTPATIENT)
Dept: OBSTETRICS AND GYNECOLOGY | Facility: CLINIC | Age: 34
End: 2024-01-08

## 2024-01-08 ENCOUNTER — HOSPITAL ENCOUNTER (OUTPATIENT)
Facility: HOSPITAL | Age: 34
End: 2024-01-08
Attending: OBSTETRICS & GYNECOLOGY | Admitting: OBSTETRICS & GYNECOLOGY
Payer: COMMERCIAL

## 2024-01-08 ENCOUNTER — HOSPITAL ENCOUNTER (OUTPATIENT)
Facility: HOSPITAL | Age: 34
Discharge: HOME | End: 2024-01-08
Attending: OBSTETRICS & GYNECOLOGY | Admitting: OBSTETRICS & GYNECOLOGY
Payer: COMMERCIAL

## 2024-01-08 ENCOUNTER — APPOINTMENT (OUTPATIENT)
Dept: PEDIATRICS | Facility: CLINIC | Age: 34
End: 2024-01-08
Payer: COMMERCIAL

## 2024-01-08 VITALS
HEIGHT: 62 IN | WEIGHT: 170.86 LBS | OXYGEN SATURATION: 96 % | TEMPERATURE: 98.2 F | BODY MASS INDEX: 31.44 KG/M2 | HEART RATE: 108 BPM | DIASTOLIC BLOOD PRESSURE: 77 MMHG | SYSTOLIC BLOOD PRESSURE: 121 MMHG

## 2024-01-08 PROCEDURE — 99214 OFFICE O/P EST MOD 30 MIN: CPT | Mod: 25

## 2024-01-08 PROCEDURE — 99214 OFFICE O/P EST MOD 30 MIN: CPT | Performed by: NURSE PRACTITIONER

## 2024-01-08 ASSESSMENT — PAIN SCALES - GENERAL: PAINLEVEL_OUTOF10: 0 - NO PAIN

## 2024-01-08 NOTE — H&P
Obstetrical Triage History and Physical     Inga Sams is a 33 y.o.  at 32w5d. SADIE: 2024, by Last Menstrual Period.  She has had prenatal care with Dr. Stover .    Chief Complaint: Decreased Fetal Movement    Assessment/Plan      Decreased FM  - minimal movement noted since this am  - now feeling good FM on arrival to triage  - , RNST  - BSUS per Dr. Lester and Brady CNP: fetus transverse, AUGUSTUS 12.38  - Discussed FMCs, warning signs, when to return for eval    Maternal Well-being  - Vital signs stable and WNL  - Emotional support and reassurance provided  - All questions and concerns addressed    Dispo: Home with precautions. Keep appts on  and  as scheduled     Plan and tracing discussed with Dr. Lester who reviewed tracing and agrees with d/c home    Sade Villeda, SARAH-CNP      Active Problems:  There are no active Hospital Problems.      gangsted  Problems (from 10/19/23 to present)       Problem Noted Resolved    Fetal cardiac anomaly affecting pregnancy, antepartum 2023 by Mariam Stover MD No    Priority:  Medium      Overview Signed 2023 11:00 AM by Mariam Stover MD     Possible tiny apical muscular ventricular septal defect.   code:1: non-urgent peds cardiology consult prior to discharge or within 1-2 weeks if delivered at an outside hospital  Per peds cardiology:  No changes to prenatal care.   Further fetal cardiac imaging is not required at this time.  We would be happy to see Ms. Inga Sams in the future if new concerns arise.     Triage code 1:   No changes to delivery planning.  Delivery per obstetrics at patient´s preferred hospital.  Standard  care per  team.  A non-urgent pediatric cardiology consult should be performed prior to hospital discharge or as an outpatient in 1-2 weeks if delivering at an outside hospital.  Can be performed sooner if there are any clinical concerns.           History of maternal cardiac  surgery 10/19/2023 by Mariam Stover MD No    Priority:  Medium      Overview Addendum 2024 10:41 AM by Mariam Stover MD     Mesh implants for repair  Fetal echo needed- scheduled   Maternal cardiology consult ordered- normal maternal echo EF 55-60%  Follow growth.         COVID-19 affecting pregnancy in first trimester 10/19/2023 by Mariam Stover MD No    Priority:  Medium      Overview Signed 10/19/2023  7:25 AM by Mariam Stover MD     Diagnosed 23  Will need growth at 30, 36 weeks           Reactive depression 10/19/2023 by Mariam Stover MD No    Priority:  Medium      Overview Addendum 2023 11:21 AM by Mariam Stover MD     On Sertraline         Prenatal care, subsequent pregnancy in third trimester 10/19/2023 by Mariam Stover MD No    Priority:  Medium      Overview Signed 10/19/2023  7:27 AM by Mariam Stover MD     S/p rr cf DNA         Exposure to HIV 2023 by Kasey Villa MD No    Priority:  Medium      Overview Signed 10/19/2023  7:22 AM by Mariam Stover MD      with HIV, On Truvada               Bell Xiong is here with c/o decreased fetal movement since this am. She drank a soda, laid down to her left side, and only felt one kick. She denies VB, LOF. She reports occasional contractions and now good FM on arrival to triage.      Obstetrical History   OB History    Para Term  AB Living   4 2 2   1 2   SAB IAB Ectopic Multiple Live Births   1       2      # Outcome Date GA Lbr Ty/2nd Weight Sex Delivery Anes PTL Lv   4 Current            3 SAB      Complete      2 Term         JESSIE   1 Term         JESSIE       Past Medical History  No past medical history on file.     Past Surgical History   Past Surgical History:   Procedure Laterality Date    ASD REPAIR         Social History  Social History     Tobacco Use    Smoking status: Never    Smokeless tobacco: Never   Substance Use Topics     Alcohol use: Not on file     Substance and Sexual Activity   Drug Use Not on file       Allergies  Patient has no known allergies.     Medications  Medications Prior to Admission   Medication Sig Dispense Refill Last Dose    emtricitabine-tenofovir, TDF, (Truvada) 200-300 mg tablet TAKE 1 TABLET DAILY 30 tablet 2     ondansetron (Zofran) 4 mg tablet Take 1 tablet (4 mg) by mouth every 8 hours if needed for nausea or vomiting.       PNV no.95/ferrous fum/folic ac (PRENATAL ORAL) Take by mouth.       sertraline (Zoloft) 50 mg tablet Take 1 tablet (50 mg) by mouth once daily. 30 tablet 0        Objective    Last Vitals  Temp Pulse Resp BP MAP O2 Sat   36.8 °C (98.2 °F) 103   121/77   97 %     Physical Examination  Physical Exam  Constitutional:       Appearance: Normal appearance.   HENT:      Head: Normocephalic and atraumatic.      Mouth/Throat:      Mouth: Mucous membranes are dry.   Pulmonary:      Effort: Pulmonary effort is normal.   Abdominal:      Palpations: Abdomen is soft.      Tenderness: There is no abdominal tenderness.   Genitourinary:     Comments: Fetal Assessment  Movement: Present  Mode: External US  Baseline Fetal Heart Rate (bpm): 155 bpm  Baseline Classification: Normal  Variability: Moderate (Between 6 and 25 BPM)  Pattern: Accelerations, no decels  Contraction Frequency: occasional  - BSUS per Dr. Lester and Brady CNP: fetus transverse, AUGUSTUS 12.38    Musculoskeletal:         General: Normal range of motion.      Cervical back: Normal range of motion.   Skin:     General: Skin is warm and dry.   Neurological:      General: No focal deficit present.      Mental Status: She is alert and oriented to person, place, and time.   Psychiatric:         Mood and Affect: Mood normal.         Behavior: Behavior normal.

## 2024-01-08 NOTE — PROGRESS NOTES
contacted pt  name and  verified  Spoke with pt states she is experiencing decreased fetal movement  Baby is not moving like normal  Pt denies lof, bleeding states she has occasional contractions  Pt instructed to go to L&D for evaluation  pt verbalized understanding  no further questions or concerns at this time      Called L&D Triage [phone] spoke with Stephani made aware pt is in route to be evaluated

## 2024-01-18 ENCOUNTER — ROUTINE PRENATAL (OUTPATIENT)
Dept: OBSTETRICS AND GYNECOLOGY | Facility: CLINIC | Age: 34
End: 2024-01-18
Payer: COMMERCIAL

## 2024-01-18 VITALS — DIASTOLIC BLOOD PRESSURE: 64 MMHG | SYSTOLIC BLOOD PRESSURE: 116 MMHG | WEIGHT: 173 LBS | BODY MASS INDEX: 31.64 KG/M2

## 2024-01-18 DIAGNOSIS — U07.1 COVID-19 AFFECTING PREGNANCY IN FIRST TRIMESTER (HHS-HCC): ICD-10-CM

## 2024-01-18 DIAGNOSIS — F32.9 REACTIVE DEPRESSION: ICD-10-CM

## 2024-01-18 DIAGNOSIS — O98.511 COVID-19 AFFECTING PREGNANCY IN FIRST TRIMESTER (HHS-HCC): ICD-10-CM

## 2024-01-18 DIAGNOSIS — Z34.83 PRENATAL CARE, SUBSEQUENT PREGNANCY IN THIRD TRIMESTER (HHS-HCC): ICD-10-CM

## 2024-01-18 DIAGNOSIS — Z20.6 EXPOSURE TO HIV: ICD-10-CM

## 2024-01-18 DIAGNOSIS — O35.BXX1 ANOMALY OF HEART OF FETUS AFFECTING PREGNANCY, ANTEPARTUM, FETUS 1 OF MULTIPLE GESTATION (HHS-HCC): ICD-10-CM

## 2024-01-18 DIAGNOSIS — Z98.890 HISTORY OF MATERNAL CARDIAC SURGERY (HHS-HCC): ICD-10-CM

## 2024-01-18 DIAGNOSIS — O09.899 HISTORY OF MATERNAL CARDIAC SURGERY (HHS-HCC): ICD-10-CM

## 2024-01-18 PROCEDURE — 0501F PRENATAL FLOW SHEET: CPT | Performed by: OBSTETRICS & GYNECOLOGY

## 2024-01-18 NOTE — PROGRESS NOTES
34.1 week Ob visit.   Seen on L&D for decreased movement, feeling it but a little decreased.   Has growth next week.   Received RSV vaccine last week.     Problem List Items Addressed This Visit       Exposure to HIV    Overview      with HIV, On Truvada         History of maternal cardiac surgery    Overview     Mesh implants for repair  Fetal echo needed- scheduled   Maternal cardiology consult ordered- normal maternal echo EF 55-60%  Follow growth.         COVID-19 affecting pregnancy in first trimester    Overview     Diagnosed 23  Will need growth at 30, 36 weeks           Reactive depression    Overview     On Sertraline         Prenatal care, subsequent pregnancy in third trimester    Overview     S/p rr cf DNA         Fetal cardiac anomaly affecting pregnancy, antepartum    Overview     Possible tiny apical muscular ventricular septal defect.   code:1: non-urgent peds cardiology consult prior to discharge or within 1-2 weeks if delivered at an outside hospital  Per peds cardiology:  No changes to prenatal care.   Further fetal cardiac imaging is not required at this time.  We would be happy to see Ms. Inga Sams in the future if new concerns arise.     Triage code 1:   No changes to delivery planning.  Delivery per obstetrics at patient´s preferred hospital.  Standard  care per  team.  A non-urgent pediatric cardiology consult should be performed prior to hospital discharge or as an outpatient in 1-2 weeks if delivering at an outside hospital.  Can be performed sooner if there are any clinical concerns.

## 2024-01-25 ENCOUNTER — HOSPITAL ENCOUNTER (OUTPATIENT)
Dept: RADIOLOGY | Facility: CLINIC | Age: 34
Discharge: HOME | End: 2024-01-25
Payer: COMMERCIAL

## 2024-01-25 DIAGNOSIS — Z34.83 PRENATAL CARE, SUBSEQUENT PREGNANCY IN THIRD TRIMESTER (HHS-HCC): ICD-10-CM

## 2024-01-25 PROCEDURE — 76816 OB US FOLLOW-UP PER FETUS: CPT | Performed by: OBSTETRICS & GYNECOLOGY

## 2024-01-25 PROCEDURE — 76819 FETAL BIOPHYS PROFIL W/O NST: CPT | Performed by: OBSTETRICS & GYNECOLOGY

## 2024-01-25 PROCEDURE — 76816 OB US FOLLOW-UP PER FETUS: CPT

## 2024-02-01 ENCOUNTER — ROUTINE PRENATAL (OUTPATIENT)
Dept: OBSTETRICS AND GYNECOLOGY | Facility: CLINIC | Age: 34
End: 2024-02-01
Payer: COMMERCIAL

## 2024-02-01 VITALS — WEIGHT: 173 LBS | SYSTOLIC BLOOD PRESSURE: 110 MMHG | DIASTOLIC BLOOD PRESSURE: 84 MMHG | BODY MASS INDEX: 31.64 KG/M2

## 2024-02-01 DIAGNOSIS — F32.9 REACTIVE DEPRESSION: ICD-10-CM

## 2024-02-01 DIAGNOSIS — O98.511 COVID-19 AFFECTING PREGNANCY IN FIRST TRIMESTER (HHS-HCC): ICD-10-CM

## 2024-02-01 DIAGNOSIS — O35.BXX1 ANOMALY OF HEART OF FETUS AFFECTING PREGNANCY, ANTEPARTUM, FETUS 1 OF MULTIPLE GESTATION (HHS-HCC): ICD-10-CM

## 2024-02-01 DIAGNOSIS — O09.899 HISTORY OF MATERNAL CARDIAC SURGERY (HHS-HCC): ICD-10-CM

## 2024-02-01 DIAGNOSIS — Z34.83 PRENATAL CARE, SUBSEQUENT PREGNANCY IN THIRD TRIMESTER (HHS-HCC): Primary | ICD-10-CM

## 2024-02-01 DIAGNOSIS — Z98.890 HISTORY OF MATERNAL CARDIAC SURGERY (HHS-HCC): ICD-10-CM

## 2024-02-01 DIAGNOSIS — Z20.6 EXPOSURE TO HIV: ICD-10-CM

## 2024-02-01 DIAGNOSIS — U07.1 COVID-19 AFFECTING PREGNANCY IN FIRST TRIMESTER (HHS-HCC): ICD-10-CM

## 2024-02-01 PROCEDURE — 87081 CULTURE SCREEN ONLY: CPT | Performed by: OBSTETRICS & GYNECOLOGY

## 2024-02-01 PROCEDURE — 0501F PRENATAL FLOW SHEET: CPT | Performed by: OBSTETRICS & GYNECOLOGY

## 2024-02-01 NOTE — PROGRESS NOTES
Subjective   Patient ID [id number]   Inga Sams is a 33 y.o.  at 36w1d with a working estimated date of delivery of 2024, by Last Menstrual Period who presents for a routine prenatal visit. She denies vaginal bleeding, leakage of fluid, decreased fetal movements, or contractions. +Pelvic pressure.     Her pregnancy is complicated by:  Hx of cardiac surgery, Small fetal VSD, COVID 19 in first trimester  Partner HIV+, on Truvada    Objective   Physical Exam:   Weight: 78.5 kg (173 lb)  Expected Total Weight Gain: Could not be calculated   Pregravid BMI: Could not be calculated  BP: 110/84                  Prenatal Labs  Urine Dip:    Lab Results   Component Value Date    HGB 12.5 2023    HCT 36.5 2023    ABO O 2023    HEPBSAG NONREACTIVE 2023     Imaging  The most recent ultrasound was performed on 24  with a study GA of 35.1  and EFW of 2563 gms .          Assessment/Plan   Problem List Items Addressed This Visit             ICD-10-CM    Exposure to HIV Z20.6    History of maternal cardiac surgery O09.899, Z98.890    COVID-19 affecting pregnancy in first trimester O98.511, U07.1    Reactive depression F32.9    Prenatal care, subsequent pregnancy in third trimester - Primary Z34.83    Relevant Orders    Group B Streptococcus (GBS) Prenatal Screen, Culture    Fetal cardiac anomaly affecting pregnancy, antepartum O35.BXX0     Continue prenatal vitamin.  Labs reviewed.  GBS taken.  Expected mode of delivery vaginal  Follow up in 1 week for a routine prenatal visit.  Discussed IOL at term.

## 2024-02-03 LAB — GP B STREP GENITAL QL CULT: ABNORMAL

## 2024-02-08 ENCOUNTER — DOCUMENTATION (OUTPATIENT)
Dept: OBSTETRICS AND GYNECOLOGY | Facility: CLINIC | Age: 34
End: 2024-02-08

## 2024-02-08 ENCOUNTER — ROUTINE PRENATAL (OUTPATIENT)
Dept: OBSTETRICS AND GYNECOLOGY | Facility: CLINIC | Age: 34
End: 2024-02-08
Payer: COMMERCIAL

## 2024-02-08 VITALS — WEIGHT: 176 LBS | SYSTOLIC BLOOD PRESSURE: 122 MMHG | DIASTOLIC BLOOD PRESSURE: 70 MMHG | BODY MASS INDEX: 32.19 KG/M2

## 2024-02-08 DIAGNOSIS — O09.899 HISTORY OF MATERNAL CARDIAC SURGERY (HHS-HCC): ICD-10-CM

## 2024-02-08 DIAGNOSIS — U07.1 COVID-19 AFFECTING PREGNANCY IN FIRST TRIMESTER (HHS-HCC): ICD-10-CM

## 2024-02-08 DIAGNOSIS — Z3A.37 37 WEEKS GESTATION OF PREGNANCY (HHS-HCC): Primary | ICD-10-CM

## 2024-02-08 DIAGNOSIS — F32.9 REACTIVE DEPRESSION: ICD-10-CM

## 2024-02-08 DIAGNOSIS — O98.511 COVID-19 AFFECTING PREGNANCY IN FIRST TRIMESTER (HHS-HCC): ICD-10-CM

## 2024-02-08 DIAGNOSIS — Z34.83 PRENATAL CARE, SUBSEQUENT PREGNANCY IN THIRD TRIMESTER (HHS-HCC): ICD-10-CM

## 2024-02-08 DIAGNOSIS — Z20.6 EXPOSURE TO HIV: ICD-10-CM

## 2024-02-08 DIAGNOSIS — Z98.890 HISTORY OF MATERNAL CARDIAC SURGERY (HHS-HCC): ICD-10-CM

## 2024-02-08 DIAGNOSIS — Z3A.39 39 WEEKS GESTATION OF PREGNANCY (HHS-HCC): ICD-10-CM

## 2024-02-08 DIAGNOSIS — O35.BXX1 ANOMALY OF HEART OF FETUS AFFECTING PREGNANCY, ANTEPARTUM, FETUS 1 OF MULTIPLE GESTATION (HHS-HCC): ICD-10-CM

## 2024-02-08 PROCEDURE — 0501F PRENATAL FLOW SHEET: CPT | Performed by: OBSTETRICS & GYNECOLOGY

## 2024-02-08 RX ORDER — BREAST PUMP
1 EACH MISCELLANEOUS ONCE
Qty: 1 EACH | Refills: 0 | Status: SHIPPED | OUTPATIENT
Start: 2024-02-08 | End: 2024-02-08

## 2024-02-08 NOTE — PROGRESS NOTES
Subjective   Patient ID [id number]   Inga Sams is a 33 y.o.  at 37w1d with a working estimated date of delivery of 2024, by Last Menstrual Period who presents for a routine prenatal visit. She denies vaginal bleeding, leakage of fluid, decreased fetal movements, or contractions. Needs breast pump. Interested in IOL at 39 weeks.     Her pregnancy is complicated by:  Maternal cardiac surgery  Fetal VSD  Exposure to HIV- On Truvada    Objective   Physical Exam:   Weight: 79.8 kg (176 lb)  Expected Total Weight Gain: Could not be calculated   Pregravid BMI: Could not be calculated  BP: 122/70                  Prenatal Labs    Lab Results   Component Value Date    HGB 12.5 2023    HCT 36.5 2023    ABO O 2023    HEPBSAG NONREACTIVE 2023     Imaging  The most recent ultrasound was performed on 24  with a study GA of 35 weeks  and EFW of 2563 gms (43%ile) .        Assessment/Plan   Problem List Items Addressed This Visit             ICD-10-CM    Exposure to HIV Z20.6    History of maternal cardiac surgery O09.899, Z98.890    COVID-19 affecting pregnancy in first trimester O98.511, U07.1    Reactive depression F32.9    Prenatal care, subsequent pregnancy in third trimester Z34.83    Fetal cardiac anomaly affecting pregnancy, antepartum O35.BXX0     Other Visit Diagnoses         Codes    37 weeks gestation of pregnancy    -  Primary Z3A.37          Continue prenatal vitamin.  Labs reviewed.  GBS positive.   Expected mode of delivery Vaginal  Follow up in 1 week for a routine prenatal visit.

## 2024-02-08 NOTE — PROGRESS NOTES
IOL scheduled for 2024.  Spoke to pt verified by name/.  I informed pt that scheduled time of 0200 pm  and induction letter reviewed to pt.  Pt verbalized understanding.   Denies any further questions.

## 2024-02-15 ENCOUNTER — ROUTINE PRENATAL (OUTPATIENT)
Dept: OBSTETRICS AND GYNECOLOGY | Facility: CLINIC | Age: 34
End: 2024-02-15
Payer: COMMERCIAL

## 2024-02-15 VITALS — WEIGHT: 176 LBS | DIASTOLIC BLOOD PRESSURE: 68 MMHG | SYSTOLIC BLOOD PRESSURE: 118 MMHG | BODY MASS INDEX: 32.19 KG/M2

## 2024-02-15 DIAGNOSIS — O35.BXX1 ANOMALY OF HEART OF FETUS AFFECTING PREGNANCY, ANTEPARTUM, FETUS 1 OF MULTIPLE GESTATION (HHS-HCC): ICD-10-CM

## 2024-02-15 DIAGNOSIS — Z34.83 PRENATAL CARE, SUBSEQUENT PREGNANCY IN THIRD TRIMESTER (HHS-HCC): ICD-10-CM

## 2024-02-15 DIAGNOSIS — Z20.6 EXPOSURE TO HIV: ICD-10-CM

## 2024-02-15 DIAGNOSIS — U07.1 COVID-19 AFFECTING PREGNANCY IN FIRST TRIMESTER (HHS-HCC): ICD-10-CM

## 2024-02-15 DIAGNOSIS — O98.511 COVID-19 AFFECTING PREGNANCY IN FIRST TRIMESTER (HHS-HCC): ICD-10-CM

## 2024-02-15 DIAGNOSIS — O09.899 HISTORY OF MATERNAL CARDIAC SURGERY (HHS-HCC): ICD-10-CM

## 2024-02-15 DIAGNOSIS — Z98.890 HISTORY OF MATERNAL CARDIAC SURGERY (HHS-HCC): ICD-10-CM

## 2024-02-15 DIAGNOSIS — F32.9 REACTIVE DEPRESSION: ICD-10-CM

## 2024-02-15 PROCEDURE — 59426 ANTEPARTUM CARE ONLY: CPT | Performed by: OBSTETRICS & GYNECOLOGY

## 2024-02-15 NOTE — PROGRESS NOTES
Subjective   Patient ID [id number]   Inga Sams is a 33 y.o.  at 38w1d with a working estimated date of delivery of 2024, by Last Menstrual Period who presents for a routine prenatal visit. She denies vaginal bleeding, leakage of fluid, decreased fetal movements, or contractions.    Her pregnancy is complicated by:  Hx maternal cardiac surgery  Fetal VSD   Exposure to HIV, on Truvada    Objective   Physical Exam:   Weight: 79.8 kg (176 lb)  Expected Total Weight Gain: Could not be calculated   Pregravid BMI: Could not be calculated  BP: 118/68    FH 38 cm    Prenatal Labs    Lab Results   Component Value Date    HGB 12.5 2023    HCT 36.5 2023    ABO O 2023    HEPBSAG NONREACTIVE 2023     Assessment/Plan   Problem List Items Addressed This Visit             ICD-10-CM    Exposure to HIV Z20.6    History of maternal cardiac surgery O09.899, Z98.890    COVID-19 affecting pregnancy in first trimester O98.511, U07.1    Reactive depression F32.9    Prenatal care, subsequent pregnancy in third trimester Z34.83    Fetal cardiac anomaly affecting pregnancy, antepartum O35.BXX0     Continue prenatal vitamin.  Labs reviewed.  GBS positive.   Expected mode of delivery vaginal. IOL scheduled 24  Follow up in 1 week for a routine prenatal visit.  Will get CBC, HIV day before induction.   Desires IUD for contraception.

## 2024-02-20 ENCOUNTER — LAB (OUTPATIENT)
Dept: LAB | Facility: LAB | Age: 34
End: 2024-02-20
Payer: COMMERCIAL

## 2024-02-20 DIAGNOSIS — Z20.6 EXPOSURE TO HIV: ICD-10-CM

## 2024-02-20 DIAGNOSIS — Z34.83 PRENATAL CARE, SUBSEQUENT PREGNANCY IN THIRD TRIMESTER (HHS-HCC): ICD-10-CM

## 2024-02-20 LAB
ERYTHROCYTE [DISTWIDTH] IN BLOOD BY AUTOMATED COUNT: 11.9 % (ref 11.5–14.5)
HCT VFR BLD AUTO: 37.5 % (ref 36–46)
HGB BLD-MCNC: 12.3 G/DL (ref 12–16)
HIV 1+2 AB+HIV1 P24 AG SERPL QL IA: NONREACTIVE
MCH RBC QN AUTO: 30.5 PG (ref 26–34)
MCHC RBC AUTO-ENTMCNC: 32.8 G/DL (ref 32–36)
MCV RBC AUTO: 93 FL (ref 80–100)
NRBC BLD-RTO: 0 /100 WBCS (ref 0–0)
PLATELET # BLD AUTO: 243 X10*3/UL (ref 150–450)
RBC # BLD AUTO: 4.03 X10*6/UL (ref 4–5.2)
WBC # BLD AUTO: 9.5 X10*3/UL (ref 4.4–11.3)

## 2024-02-20 PROCEDURE — 87389 HIV-1 AG W/HIV-1&-2 AB AG IA: CPT

## 2024-02-20 PROCEDURE — 85027 COMPLETE CBC AUTOMATED: CPT

## 2024-02-20 PROCEDURE — 36415 COLL VENOUS BLD VENIPUNCTURE: CPT

## 2024-02-21 ENCOUNTER — HOSPITAL ENCOUNTER (INPATIENT)
Facility: HOSPITAL | Age: 34
LOS: 2 days | Discharge: HOME | End: 2024-02-23
Attending: OBSTETRICS & GYNECOLOGY | Admitting: STUDENT IN AN ORGANIZED HEALTH CARE EDUCATION/TRAINING PROGRAM
Payer: COMMERCIAL

## 2024-02-21 ENCOUNTER — APPOINTMENT (OUTPATIENT)
Dept: OBSTETRICS AND GYNECOLOGY | Facility: HOSPITAL | Age: 34
End: 2024-02-21
Payer: COMMERCIAL

## 2024-02-21 ENCOUNTER — ANESTHESIA EVENT (OUTPATIENT)
Dept: OBSTETRICS AND GYNECOLOGY | Facility: HOSPITAL | Age: 34
End: 2024-02-21
Payer: COMMERCIAL

## 2024-02-21 ENCOUNTER — ANESTHESIA (OUTPATIENT)
Dept: OBSTETRICS AND GYNECOLOGY | Facility: HOSPITAL | Age: 34
End: 2024-02-21
Payer: COMMERCIAL

## 2024-02-21 DIAGNOSIS — Z3A.39 39 WEEKS GESTATION OF PREGNANCY (HHS-HCC): ICD-10-CM

## 2024-02-21 LAB
ABO GROUP (TYPE) IN BLOOD: NORMAL
ANTIBODY SCREEN: NORMAL
ERYTHROCYTE [DISTWIDTH] IN BLOOD BY AUTOMATED COUNT: 11.9 % (ref 11.5–14.5)
HCT VFR BLD AUTO: 33.8 % (ref 36–46)
HGB BLD-MCNC: 11.4 G/DL (ref 12–16)
MCH RBC QN AUTO: 30.1 PG (ref 26–34)
MCHC RBC AUTO-ENTMCNC: 33.7 G/DL (ref 32–36)
MCV RBC AUTO: 89 FL (ref 80–100)
NRBC BLD-RTO: 0 /100 WBCS (ref 0–0)
PLATELET # BLD AUTO: 252 X10*3/UL (ref 150–450)
RBC # BLD AUTO: 3.79 X10*6/UL (ref 4–5.2)
RH FACTOR (ANTIGEN D): NORMAL
TREPONEMA PALLIDUM IGG+IGM AB [PRESENCE] IN SERUM OR PLASMA BY IMMUNOASSAY: NONREACTIVE
WBC # BLD AUTO: 9.1 X10*3/UL (ref 4.4–11.3)

## 2024-02-21 PROCEDURE — 86920 COMPATIBILITY TEST SPIN: CPT

## 2024-02-21 PROCEDURE — 2500000004 HC RX 250 GENERAL PHARMACY W/ HCPCS (ALT 636 FOR OP/ED)

## 2024-02-21 PROCEDURE — 86780 TREPONEMA PALLIDUM: CPT

## 2024-02-21 PROCEDURE — 2500000004 HC RX 250 GENERAL PHARMACY W/ HCPCS (ALT 636 FOR OP/ED): Performed by: STUDENT IN AN ORGANIZED HEALTH CARE EDUCATION/TRAINING PROGRAM

## 2024-02-21 PROCEDURE — 2500000005 HC RX 250 GENERAL PHARMACY W/O HCPCS: Performed by: STUDENT IN AN ORGANIZED HEALTH CARE EDUCATION/TRAINING PROGRAM

## 2024-02-21 PROCEDURE — 99222 1ST HOSP IP/OBS MODERATE 55: CPT

## 2024-02-21 PROCEDURE — 86901 BLOOD TYPING SEROLOGIC RH(D): CPT

## 2024-02-21 PROCEDURE — 2500000005 HC RX 250 GENERAL PHARMACY W/O HCPCS

## 2024-02-21 PROCEDURE — 85027 COMPLETE CBC AUTOMATED: CPT

## 2024-02-21 PROCEDURE — 36415 COLL VENOUS BLD VENIPUNCTURE: CPT

## 2024-02-21 PROCEDURE — 3E033VJ INTRODUCTION OF OTHER HORMONE INTO PERIPHERAL VEIN, PERCUTANEOUS APPROACH: ICD-10-PCS | Performed by: OBSTETRICS & GYNECOLOGY

## 2024-02-21 PROCEDURE — 1120000001 HC OB PRIVATE ROOM DAILY

## 2024-02-21 RX ORDER — OXYTOCIN/0.9 % SODIUM CHLORIDE 30/500 ML
60 PLASTIC BAG, INJECTION (ML) INTRAVENOUS ONCE AS NEEDED
Status: COMPLETED | OUTPATIENT
Start: 2024-02-21 | End: 2024-02-22

## 2024-02-21 RX ORDER — MISOPROSTOL 200 UG/1
800 TABLET ORAL ONCE AS NEEDED
Status: DISCONTINUED | OUTPATIENT
Start: 2024-02-21 | End: 2024-02-22

## 2024-02-21 RX ORDER — EMTRICITABINE AND TENOFOVIR DISOPROXIL FUMARATE 200; 300 MG/1; MG/1
1 TABLET, FILM COATED ORAL DAILY
Status: DISCONTINUED | OUTPATIENT
Start: 2024-02-21 | End: 2024-02-23 | Stop reason: HOSPADM

## 2024-02-21 RX ORDER — LABETALOL HYDROCHLORIDE 5 MG/ML
20 INJECTION, SOLUTION INTRAVENOUS ONCE AS NEEDED
Status: DISCONTINUED | OUTPATIENT
Start: 2024-02-21 | End: 2024-02-22

## 2024-02-21 RX ORDER — PENICILLIN G 3000000 [IU]/50ML
3 INJECTION, SOLUTION INTRAVENOUS EVERY 4 HOURS
Status: DISCONTINUED | OUTPATIENT
Start: 2024-02-21 | End: 2024-02-22

## 2024-02-21 RX ORDER — ONDANSETRON HYDROCHLORIDE 2 MG/ML
4 INJECTION, SOLUTION INTRAVENOUS EVERY 6 HOURS PRN
Status: DISCONTINUED | OUTPATIENT
Start: 2024-02-21 | End: 2024-02-22

## 2024-02-21 RX ORDER — LIDOCAINE HCL/EPINEPHRINE/PF 2%-1:200K
VIAL (ML) INJECTION AS NEEDED
Status: DISCONTINUED | OUTPATIENT
Start: 2024-02-21 | End: 2024-02-22

## 2024-02-21 RX ORDER — TRANEXAMIC ACID 100 MG/ML
1000 INJECTION, SOLUTION INTRAVENOUS ONCE AS NEEDED
Status: DISCONTINUED | OUTPATIENT
Start: 2024-02-21 | End: 2024-02-22

## 2024-02-21 RX ORDER — HYDRALAZINE HYDROCHLORIDE 20 MG/ML
5 INJECTION INTRAMUSCULAR; INTRAVENOUS ONCE AS NEEDED
Status: DISCONTINUED | OUTPATIENT
Start: 2024-02-21 | End: 2024-02-22

## 2024-02-21 RX ORDER — ONDANSETRON 4 MG/1
4 TABLET, FILM COATED ORAL EVERY 6 HOURS PRN
Status: DISCONTINUED | OUTPATIENT
Start: 2024-02-21 | End: 2024-02-22

## 2024-02-21 RX ORDER — LOPERAMIDE HYDROCHLORIDE 2 MG/1
4 CAPSULE ORAL EVERY 2 HOUR PRN
Status: DISCONTINUED | OUTPATIENT
Start: 2024-02-21 | End: 2024-02-22

## 2024-02-21 RX ORDER — FENTANYL CITRATE 50 UG/ML
50 INJECTION, SOLUTION INTRAMUSCULAR; INTRAVENOUS ONCE
Status: COMPLETED | OUTPATIENT
Start: 2024-02-21 | End: 2024-02-21

## 2024-02-21 RX ORDER — FENTANYL/BUPIVACAINE/NS/PF 2MCG/ML-.1
PLASTIC BAG, INJECTION (ML) INJECTION AS NEEDED
Status: DISCONTINUED | OUTPATIENT
Start: 2024-02-21 | End: 2024-02-22

## 2024-02-21 RX ORDER — OXYTOCIN/0.9 % SODIUM CHLORIDE 30/500 ML
2-30 PLASTIC BAG, INJECTION (ML) INTRAVENOUS CONTINUOUS
Status: DISCONTINUED | OUTPATIENT
Start: 2024-02-21 | End: 2024-02-22

## 2024-02-21 RX ORDER — METHYLERGONOVINE MALEATE 0.2 MG/ML
0.2 INJECTION INTRAVENOUS ONCE AS NEEDED
Status: DISCONTINUED | OUTPATIENT
Start: 2024-02-21 | End: 2024-02-21

## 2024-02-21 RX ORDER — TERBUTALINE SULFATE 1 MG/ML
0.25 INJECTION SUBCUTANEOUS ONCE AS NEEDED
Status: DISCONTINUED | OUTPATIENT
Start: 2024-02-21 | End: 2024-02-22

## 2024-02-21 RX ORDER — LIDOCAINE HYDROCHLORIDE 10 MG/ML
30 INJECTION INFILTRATION; PERINEURAL ONCE AS NEEDED
Status: DISCONTINUED | OUTPATIENT
Start: 2024-02-21 | End: 2024-02-22

## 2024-02-21 RX ORDER — FENTANYL/BUPIVACAINE/NS/PF 2MCG/ML-.1
PLASTIC BAG, INJECTION (ML) INJECTION CONTINUOUS PRN
Status: DISCONTINUED | OUTPATIENT
Start: 2024-02-21 | End: 2024-02-22

## 2024-02-21 RX ORDER — OXYTOCIN 10 [USP'U]/ML
10 INJECTION, SOLUTION INTRAMUSCULAR; INTRAVENOUS ONCE AS NEEDED
Status: DISCONTINUED | OUTPATIENT
Start: 2024-02-21 | End: 2024-02-22

## 2024-02-21 RX ORDER — METOCLOPRAMIDE HYDROCHLORIDE 5 MG/ML
10 INJECTION INTRAMUSCULAR; INTRAVENOUS EVERY 6 HOURS PRN
Status: DISCONTINUED | OUTPATIENT
Start: 2024-02-21 | End: 2024-02-22

## 2024-02-21 RX ORDER — SODIUM CHLORIDE, SODIUM LACTATE, POTASSIUM CHLORIDE, CALCIUM CHLORIDE 600; 310; 30; 20 MG/100ML; MG/100ML; MG/100ML; MG/100ML
125 INJECTION, SOLUTION INTRAVENOUS CONTINUOUS
Status: DISCONTINUED | OUTPATIENT
Start: 2024-02-21 | End: 2024-02-22

## 2024-02-21 RX ORDER — METOCLOPRAMIDE 10 MG/1
10 TABLET ORAL EVERY 6 HOURS PRN
Status: DISCONTINUED | OUTPATIENT
Start: 2024-02-21 | End: 2024-02-22

## 2024-02-21 RX ORDER — SERTRALINE HYDROCHLORIDE 50 MG/1
50 TABLET, FILM COATED ORAL DAILY
Status: DISCONTINUED | OUTPATIENT
Start: 2024-02-21 | End: 2024-02-23 | Stop reason: HOSPADM

## 2024-02-21 RX ORDER — CARBOPROST TROMETHAMINE 250 UG/ML
250 INJECTION, SOLUTION INTRAMUSCULAR ONCE AS NEEDED
Status: DISCONTINUED | OUTPATIENT
Start: 2024-02-21 | End: 2024-02-22

## 2024-02-21 RX ORDER — NIFEDIPINE 10 MG/1
10 CAPSULE ORAL ONCE AS NEEDED
Status: DISCONTINUED | OUTPATIENT
Start: 2024-02-21 | End: 2024-02-22

## 2024-02-21 RX ADMIN — PENICILLIN G POTASSIUM 5 MILLION UNITS: 5000000 INJECTION, POWDER, FOR SOLUTION INTRAMUSCULAR; INTRAVENOUS at 17:45

## 2024-02-21 RX ADMIN — FENTANYL CITRATE 50 MCG: 50 INJECTION, SOLUTION INTRAMUSCULAR; INTRAVENOUS at 18:35

## 2024-02-21 RX ADMIN — Medication 5 ML: at 23:44

## 2024-02-21 RX ADMIN — LIDOCAINE HYDROCHLORIDE,EPINEPHRINE BITARTRATE 3 ML: 20; .005 INJECTION, SOLUTION EPIDURAL; INFILTRATION; INTRACAUDAL; PERINEURAL at 23:42

## 2024-02-21 RX ADMIN — Medication 5 ML: at 23:45

## 2024-02-21 RX ADMIN — PENICILLIN G 3 MILLION UNITS: 3000000 INJECTION, SOLUTION INTRAVENOUS at 21:24

## 2024-02-21 RX ADMIN — Medication 2 MILLI-UNITS/MIN: at 17:50

## 2024-02-21 RX ADMIN — SODIUM CHLORIDE, POTASSIUM CHLORIDE, SODIUM LACTATE AND CALCIUM CHLORIDE 125 ML/HR: 600; 310; 30; 20 INJECTION, SOLUTION INTRAVENOUS at 15:10

## 2024-02-21 RX ADMIN — Medication 14 ML/HR: at 23:48

## 2024-02-21 RX ADMIN — ONDANSETRON HYDROCHLORIDE 4 MG: 4 TABLET, FILM COATED ORAL at 20:34

## 2024-02-21 SDOH — SOCIAL STABILITY: SOCIAL INSECURITY: HAS ANYONE EVER THREATENED TO HURT YOUR FAMILY OR YOUR PETS?: NO

## 2024-02-21 SDOH — HEALTH STABILITY: MENTAL HEALTH: CURRENT SMOKER: 0

## 2024-02-21 SDOH — HEALTH STABILITY: MENTAL HEALTH: WISH TO BE DEAD (PAST 1 MONTH): NO

## 2024-02-21 SDOH — HEALTH STABILITY: MENTAL HEALTH: SUICIDAL BEHAVIOR (LIFETIME): NO

## 2024-02-21 SDOH — SOCIAL STABILITY: SOCIAL INSECURITY: ARE THERE ANY APPARENT SIGNS OF INJURIES/BEHAVIORS THAT COULD BE RELATED TO ABUSE/NEGLECT?: NO

## 2024-02-21 SDOH — SOCIAL STABILITY: SOCIAL INSECURITY: VERBAL ABUSE: DENIES

## 2024-02-21 SDOH — SOCIAL STABILITY: SOCIAL INSECURITY: ARE YOU OR HAVE YOU BEEN THREATENED OR ABUSED PHYSICALLY, EMOTIONALLY, OR SEXUALLY BY ANYONE?: NO

## 2024-02-21 SDOH — ECONOMIC STABILITY: HOUSING INSECURITY: DO YOU FEEL UNSAFE GOING BACK TO THE PLACE WHERE YOU ARE LIVING?: NO

## 2024-02-21 SDOH — HEALTH STABILITY: MENTAL HEALTH: HAVE YOU USED ANY PRESCRIPTION DRUGS OTHER THAN PRESCRIBED IN THE PAST 12 MONTHS?: NO

## 2024-02-21 SDOH — SOCIAL STABILITY: SOCIAL INSECURITY: ABUSE SCREEN: ADULT

## 2024-02-21 SDOH — SOCIAL STABILITY: SOCIAL INSECURITY: DO YOU FEEL ANYONE HAS EXPLOITED OR TAKEN ADVANTAGE OF YOU FINANCIALLY OR OF YOUR PERSONAL PROPERTY?: NO

## 2024-02-21 SDOH — SOCIAL STABILITY: SOCIAL INSECURITY: HAVE YOU HAD THOUGHTS OF HARMING ANYONE ELSE?: NO

## 2024-02-21 SDOH — SOCIAL STABILITY: SOCIAL INSECURITY: PHYSICAL ABUSE: DENIES

## 2024-02-21 SDOH — HEALTH STABILITY: MENTAL HEALTH: HAVE YOU USED ANY SUBSTANCES (CANABIS, COCAINE, HEROIN, HALLUCINOGENS, INHALANTS, ETC.) IN THE PAST 12 MONTHS?: NO

## 2024-02-21 SDOH — HEALTH STABILITY: MENTAL HEALTH: NON-SPECIFIC ACTIVE SUICIDAL THOUGHTS (PAST 1 MONTH): NO

## 2024-02-21 SDOH — SOCIAL STABILITY: SOCIAL INSECURITY: DOES ANYONE TRY TO KEEP YOU FROM HAVING/CONTACTING OTHER FRIENDS OR DOING THINGS OUTSIDE YOUR HOME?: NO

## 2024-02-21 SDOH — HEALTH STABILITY: MENTAL HEALTH: WERE YOU ABLE TO COMPLETE ALL THE BEHAVIORAL HEALTH SCREENINGS?: YES

## 2024-02-21 ASSESSMENT — PAIN SCALES - GENERAL
PAINLEVEL_OUTOF10: 0 - NO PAIN

## 2024-02-21 ASSESSMENT — PATIENT HEALTH QUESTIONNAIRE - PHQ9
SUM OF ALL RESPONSES TO PHQ9 QUESTIONS 1 & 2: 0
2. FEELING DOWN, DEPRESSED OR HOPELESS: NOT AT ALL
1. LITTLE INTEREST OR PLEASURE IN DOING THINGS: NOT AT ALL

## 2024-02-21 ASSESSMENT — ACTIVITIES OF DAILY LIVING (ADL): LACK_OF_TRANSPORTATION: NO

## 2024-02-21 ASSESSMENT — LIFESTYLE VARIABLES
HOW MANY STANDARD DRINKS CONTAINING ALCOHOL DO YOU HAVE ON A TYPICAL DAY: PATIENT DOES NOT DRINK
AUDIT-C TOTAL SCORE: 0
HOW OFTEN DO YOU HAVE A DRINK CONTAINING ALCOHOL: NEVER
AUDIT-C TOTAL SCORE: 0
HOW OFTEN DO YOU HAVE 6 OR MORE DRINKS ON ONE OCCASION: NEVER
SKIP TO QUESTIONS 9-10: 1

## 2024-02-21 NOTE — H&P
Obstetrical Admission History and Physical     Inga Sams is a 33 y.o.  at 39w0d. SADIE: 2024, by Last Menstrual Period. Estimated fetal weight: 7#. She has had prenatal care with Dr. Stover .    Chief Complaint: Scheduled Induction    Assessment/Plan    IOL  -Admitted, consented, scanned - cephalic  -Delivery Plan: patient desires vaginal delivery, patient counseled on risks and benefits of induction of labor and possibility of c/s for failure of induction or non-reassuring fetal status  -Plan for IOL with pit. Will reassess for crb  -Will monitor for cervical change, will augment labor as required with pitocin and AROM following cervical ripening  -T&S and CBC on admission  -epidural per patient request  -GBS pos, pcn ppx    Maternal well being  -HIV exposure, on Truvada, avoid methergine. HIV neg yesterday  -Depression, mood stable on zoloft  -Hx of ASD, surgery as a child    Fetal Well-Being  -PNLs reviewed and WNL, GBS as above  -CEFM, cat 1 currently  -Fetal echo: fetal VSD, for non urgent peds cards consult     Postpartum   -PPBC: ppIUD    DVT PPx  -SCDs  -Ambulation if able    Pt seen and discussed with Dr. Sherron Deleon MD PGY-1        Principal Problem:    Labor and delivery indication for care or intervention      gangsted  Problems (from 10/19/23 to present)       Problem Noted Resolved    Labor and delivery indication for care or intervention 2024 by Masha Deleon MD No    Priority:  Medium      Fetal cardiac anomaly affecting pregnancy, antepartum 2023 by Mariam Stover MD No    Priority:  Medium      Overview Signed 2023 11:00 AM by Mariam Stover MD     Possible tiny apical muscular ventricular septal defect.   code:1: non-urgent peds cardiology consult prior to discharge or within 1-2 weeks if delivered at an outside hospital  Per peds cardiology:  No changes to prenatal care.   Further fetal cardiac imaging is not required at this time.  We would  be happy to see Ms. Inga Sams in the future if new concerns arise.     Triage code 1:   No changes to delivery planning.  Delivery per obstetrics at patient´s preferred hospital.  Standard  care per  team.  A non-urgent pediatric cardiology consult should be performed prior to hospital discharge or as an outpatient in 1-2 weeks if delivering at an outside hospital.  Can be performed sooner if there are any clinical concerns.           History of maternal cardiac surgery 10/19/2023 by Mariam Stover MD No    Priority:  Medium      Overview Addendum 2024 10:41 AM by Mariam Stover MD     Mesh implants for repair  Fetal echo needed- scheduled   Maternal cardiology consult ordered- normal maternal echo EF 55-60%  Follow growth.         COVID-19 affecting pregnancy in first trimester 10/19/2023 by Mariam Stover MD No    Priority:  Medium      Overview Signed 10/19/2023  7:25 AM by Mariam Stover MD     Diagnosed 23  Will need growth at 30, 36 weeks           Reactive depression 10/19/2023 by Mariam Stover MD No    Priority:  Medium      Overview Addendum 2023 11:21 AM by Mariam Stover MD     On Sertraline         Prenatal care, subsequent pregnancy in third trimester 10/19/2023 by Mariam Stover MD No    Priority:  Medium      Overview Signed 10/19/2023  7:27 AM by Mariam Stover MD     S/p rr cf DNA         Exposure to HIV 2023 by Kasey Villa MD No    Priority:  Medium      Overview Signed 10/19/2023  7:22 AM by Mariam Stover MD      with HIV, On Truvada               Options for delivery have been discussed with the patient and she elects for an induction of labor.  Cervical ripening with cytotec, cervidil, other prostaglandin agents has been discussed.  Induction of labor with pitocin, amniotomy, cytotec, and cervical balloon have been discussed in detail. The risks, benefits, complications,  alternatives, expected outcomes, potential problems during recuperation and recovery, and the risks of not performing the procedure were discussed with the patient. The patient stated understanding that the risks of delivery include, but are not limited to: death; reaction to medications; injury to bowel, bladder, ureters, uterus, cervix, vagina, and other pelvic and abdominal structures, infection; blood loss and possible need for transfusion; and potential need for surgery, including hysterectomy. The risks of injury to the infant during delivery were also discussed. All questions were answered. There was concurrence with the planned procedure, and the patient wanted to proceed.    Admit to inpatient status. I anticipate that this patient will require a stay exceeding at least 2 midnights for delivery and postpartum.  Induction of labor.  Management of pregnancy complications, as indicated.    Subjective   Good fetal movement. Denies vaginal bleeding., C/O of occasional contractions., Denies leaking of fluid.       Reason for Induction of Labor:  Pregnancy at 39 weeks or greater for induction         Obstetrical History   OB History    Para Term  AB Living   4 2 2   1 2   SAB IAB Ectopic Multiple Live Births   1       2      # Outcome Date GA Lbr Ty/2nd Weight Sex Delivery Anes PTL Lv   4 Current            3 SAB      Complete      2 Term         JESSIE   1 Term         JESSIE       Past Medical History  No past medical history on file.     Past Surgical History   Past Surgical History:   Procedure Laterality Date    ASD REPAIR         Social History  Social History     Tobacco Use    Smoking status: Never    Smokeless tobacco: Never   Substance Use Topics    Alcohol use: Not on file     Substance and Sexual Activity   Drug Use Not on file       Allergies  Patient has no known allergies.     Medications  Medications Prior to Admission   Medication Sig Dispense Refill Last Dose    emtricitabine-tenofovir,  TDF, (Truvada) 200-300 mg tablet TAKE 1 TABLET DAILY 30 tablet 2     ondansetron (Zofran) 4 mg tablet Take 1 tablet (4 mg) by mouth every 8 hours if needed for nausea or vomiting.       PNV no.95/ferrous fum/folic ac (PRENATAL ORAL) Take by mouth.       sertraline (Zoloft) 50 mg tablet Take 1 tablet (50 mg) by mouth once daily. 30 tablet 0        Objective    Last Vitals  Temp Pulse Resp BP MAP O2 Sat   36.8 °C (98.2 °F) 91 18 129/75   99 %     Physical Examination  Cervical Exam  Dilation: Fingertip  Effacement (%): 50  Fetal Station: -3  Method: Manual  OB Examiner: Pancho  Fetal Assessment  Mode: External US  Baseline Fetal Heart Rate (bpm): 130 bpm  Baseline Classification: Normal  Variability: Moderate (Between 6 and 25 BPM)  Pattern: Accelerations  FHR Category: Category I  Multiple Births: No      Contraction Frequency: irregular      Difficulty with SVE. CRB  attempt unsuccessful.    Lab Review  Labs in chart were reviewed.

## 2024-02-22 ENCOUNTER — APPOINTMENT (OUTPATIENT)
Dept: OBSTETRICS AND GYNECOLOGY | Facility: CLINIC | Age: 34
End: 2024-02-22
Payer: COMMERCIAL

## 2024-02-22 PROCEDURE — 59409 OBSTETRICAL CARE: CPT

## 2024-02-22 PROCEDURE — 2500000004 HC RX 250 GENERAL PHARMACY W/ HCPCS (ALT 636 FOR OP/ED)

## 2024-02-22 PROCEDURE — 10907ZC DRAINAGE OF AMNIOTIC FLUID, THERAPEUTIC FROM PRODUCTS OF CONCEPTION, VIA NATURAL OR ARTIFICIAL OPENING: ICD-10-PCS | Performed by: OBSTETRICS & GYNECOLOGY

## 2024-02-22 PROCEDURE — 59409 OBSTETRICAL CARE: CPT | Performed by: OBSTETRICS & GYNECOLOGY

## 2024-02-22 PROCEDURE — 58300 INSERT INTRAUTERINE DEVICE: CPT

## 2024-02-22 PROCEDURE — 1100000001 HC PRIVATE ROOM DAILY

## 2024-02-22 PROCEDURE — 2500000005 HC RX 250 GENERAL PHARMACY W/O HCPCS

## 2024-02-22 PROCEDURE — 0UH97HZ INSERTION OF CONTRACEPTIVE DEVICE INTO UTERUS, VIA NATURAL OR ARTIFICIAL OPENING: ICD-10-PCS | Performed by: OBSTETRICS & GYNECOLOGY

## 2024-02-22 PROCEDURE — 2500000001 HC RX 250 WO HCPCS SELF ADMINISTERED DRUGS (ALT 637 FOR MEDICARE OP)

## 2024-02-22 PROCEDURE — 59050 FETAL MONITOR W/REPORT: CPT

## 2024-02-22 PROCEDURE — 2500000002 HC RX 250 W HCPCS SELF ADMINISTERED DRUGS (ALT 637 FOR MEDICARE OP, ALT 636 FOR OP/ED)

## 2024-02-22 PROCEDURE — 7100000016 HC LABOR RECOVERY PER HOUR

## 2024-02-22 RX ORDER — POLYETHYLENE GLYCOL 3350 17 G/17G
17 POWDER, FOR SOLUTION ORAL 2 TIMES DAILY PRN
Status: DISCONTINUED | OUTPATIENT
Start: 2024-02-22 | End: 2024-02-23 | Stop reason: HOSPADM

## 2024-02-22 RX ORDER — MINERAL OIL
OIL (ML) ORAL
Status: DISPENSED
Start: 2024-02-22 | End: 2024-02-22

## 2024-02-22 RX ORDER — ONDANSETRON 4 MG/1
4 TABLET, FILM COATED ORAL EVERY 6 HOURS PRN
Status: DISCONTINUED | OUTPATIENT
Start: 2024-02-22 | End: 2024-02-23 | Stop reason: HOSPADM

## 2024-02-22 RX ORDER — ADHESIVE BANDAGE
10 BANDAGE TOPICAL
Status: DISCONTINUED | OUTPATIENT
Start: 2024-02-22 | End: 2024-02-23 | Stop reason: HOSPADM

## 2024-02-22 RX ORDER — LIDOCAINE 560 MG/1
1 PATCH PERCUTANEOUS; TOPICAL; TRANSDERMAL
Status: DISCONTINUED | OUTPATIENT
Start: 2024-02-22 | End: 2024-02-23 | Stop reason: HOSPADM

## 2024-02-22 RX ORDER — MISOPROSTOL 200 UG/1
800 TABLET ORAL ONCE AS NEEDED
Status: DISCONTINUED | OUTPATIENT
Start: 2024-02-22 | End: 2024-02-23 | Stop reason: HOSPADM

## 2024-02-22 RX ORDER — DIPHENHYDRAMINE HYDROCHLORIDE 50 MG/ML
25 INJECTION INTRAMUSCULAR; INTRAVENOUS EVERY 6 HOURS PRN
Status: DISCONTINUED | OUTPATIENT
Start: 2024-02-22 | End: 2024-02-23 | Stop reason: HOSPADM

## 2024-02-22 RX ORDER — OXYTOCIN 10 [USP'U]/ML
10 INJECTION, SOLUTION INTRAMUSCULAR; INTRAVENOUS ONCE AS NEEDED
Status: DISCONTINUED | OUTPATIENT
Start: 2024-02-22 | End: 2024-02-23 | Stop reason: HOSPADM

## 2024-02-22 RX ORDER — ONDANSETRON HYDROCHLORIDE 2 MG/ML
4 INJECTION, SOLUTION INTRAVENOUS EVERY 6 HOURS PRN
Status: DISCONTINUED | OUTPATIENT
Start: 2024-02-22 | End: 2024-02-23 | Stop reason: HOSPADM

## 2024-02-22 RX ORDER — DIPHENHYDRAMINE HCL 25 MG
25 CAPSULE ORAL EVERY 6 HOURS PRN
Status: DISCONTINUED | OUTPATIENT
Start: 2024-02-22 | End: 2024-02-23 | Stop reason: HOSPADM

## 2024-02-22 RX ORDER — TRANEXAMIC ACID 100 MG/ML
1000 INJECTION, SOLUTION INTRAVENOUS ONCE AS NEEDED
Status: DISCONTINUED | OUTPATIENT
Start: 2024-02-22 | End: 2024-02-23 | Stop reason: HOSPADM

## 2024-02-22 RX ORDER — IBUPROFEN 600 MG/1
600 TABLET ORAL EVERY 6 HOURS
Status: DISCONTINUED | OUTPATIENT
Start: 2024-02-22 | End: 2024-02-23 | Stop reason: HOSPADM

## 2024-02-22 RX ORDER — METHYLERGONOVINE MALEATE 0.2 MG/ML
0.2 INJECTION INTRAVENOUS ONCE AS NEEDED
Status: DISCONTINUED | OUTPATIENT
Start: 2024-02-22 | End: 2024-02-23 | Stop reason: HOSPADM

## 2024-02-22 RX ORDER — CALCIUM CARBONATE 200(500)MG
1000 TABLET,CHEWABLE ORAL 4 TIMES DAILY PRN
Status: DISCONTINUED | OUTPATIENT
Start: 2024-02-22 | End: 2024-02-22

## 2024-02-22 RX ORDER — OXYTOCIN/0.9 % SODIUM CHLORIDE 30/500 ML
60 PLASTIC BAG, INJECTION (ML) INTRAVENOUS ONCE AS NEEDED
Status: DISCONTINUED | OUTPATIENT
Start: 2024-02-22 | End: 2024-02-23 | Stop reason: HOSPADM

## 2024-02-22 RX ORDER — NIFEDIPINE 10 MG/1
10 CAPSULE ORAL ONCE AS NEEDED
Status: DISCONTINUED | OUTPATIENT
Start: 2024-02-22 | End: 2024-02-23 | Stop reason: HOSPADM

## 2024-02-22 RX ORDER — LABETALOL HYDROCHLORIDE 5 MG/ML
20 INJECTION, SOLUTION INTRAVENOUS ONCE AS NEEDED
Status: DISCONTINUED | OUTPATIENT
Start: 2024-02-22 | End: 2024-02-23 | Stop reason: HOSPADM

## 2024-02-22 RX ORDER — HYDRALAZINE HYDROCHLORIDE 20 MG/ML
5 INJECTION INTRAMUSCULAR; INTRAVENOUS ONCE AS NEEDED
Status: DISCONTINUED | OUTPATIENT
Start: 2024-02-22 | End: 2024-02-23 | Stop reason: HOSPADM

## 2024-02-22 RX ORDER — CARBOPROST TROMETHAMINE 250 UG/ML
250 INJECTION, SOLUTION INTRAMUSCULAR ONCE AS NEEDED
Status: DISCONTINUED | OUTPATIENT
Start: 2024-02-22 | End: 2024-02-23 | Stop reason: HOSPADM

## 2024-02-22 RX ORDER — LOPERAMIDE HYDROCHLORIDE 2 MG/1
4 CAPSULE ORAL EVERY 2 HOUR PRN
Status: DISCONTINUED | OUTPATIENT
Start: 2024-02-22 | End: 2024-02-23 | Stop reason: HOSPADM

## 2024-02-22 RX ORDER — SIMETHICONE 80 MG
80 TABLET,CHEWABLE ORAL 4 TIMES DAILY PRN
Status: DISCONTINUED | OUTPATIENT
Start: 2024-02-22 | End: 2024-02-23 | Stop reason: HOSPADM

## 2024-02-22 RX ORDER — BISACODYL 10 MG/1
10 SUPPOSITORY RECTAL DAILY PRN
Status: DISCONTINUED | OUTPATIENT
Start: 2024-02-22 | End: 2024-02-23 | Stop reason: HOSPADM

## 2024-02-22 RX ORDER — ACETAMINOPHEN 325 MG/1
975 TABLET ORAL EVERY 6 HOURS
Status: DISCONTINUED | OUTPATIENT
Start: 2024-02-22 | End: 2024-02-23 | Stop reason: HOSPADM

## 2024-02-22 RX ADMIN — CALCIUM CARBONATE (ANTACID) CHEW TAB 500 MG 1000 MG: 500 CHEW TAB at 02:27

## 2024-02-22 RX ADMIN — PENICILLIN G 3 MILLION UNITS: 3000000 INJECTION, SOLUTION INTRAVENOUS at 05:27

## 2024-02-22 RX ADMIN — SERTRALINE HYDROCHLORIDE 50 MG: 50 TABLET ORAL at 09:45

## 2024-02-22 RX ADMIN — Medication 1 EACH: at 14:09

## 2024-02-22 RX ADMIN — PENICILLIN G 3 MILLION UNITS: 3000000 INJECTION, SOLUTION INTRAVENOUS at 01:38

## 2024-02-22 RX ADMIN — IBUPROFEN 600 MG: 600 TABLET, FILM COATED ORAL at 20:14

## 2024-02-22 RX ADMIN — Medication 60 MILLI-UNITS/MIN: at 06:52

## 2024-02-22 RX ADMIN — LEVONORGESTREL 52 MG: 52 INTRAUTERINE DEVICE INTRAUTERINE at 06:57

## 2024-02-22 RX ADMIN — IBUPROFEN 600 MG: 600 TABLET, FILM COATED ORAL at 07:46

## 2024-02-22 RX ADMIN — ACETAMINOPHEN 975 MG: 325 TABLET ORAL at 20:14

## 2024-02-22 RX ADMIN — EMTRICITABINE AND TENOFOVIR DISOPROXIL FUMARATE 1 TABLET: 200; 300 TABLET, FILM COATED ORAL at 08:56

## 2024-02-22 RX ADMIN — BENZOCAINE AND LEVOMENTHOL 1 APPLICATION: 200; 5 SPRAY TOPICAL at 14:09

## 2024-02-22 RX ADMIN — ACETAMINOPHEN 975 MG: 325 TABLET ORAL at 14:09

## 2024-02-22 RX ADMIN — ACETAMINOPHEN 975 MG: 325 TABLET ORAL at 07:46

## 2024-02-22 RX ADMIN — IBUPROFEN 600 MG: 600 TABLET, FILM COATED ORAL at 14:09

## 2024-02-22 RX ADMIN — Medication 2 MILLI-UNITS/MIN: at 05:29

## 2024-02-22 ASSESSMENT — PAIN SCALES - GENERAL

## 2024-02-22 NOTE — ANESTHESIA PREPROCEDURE EVALUATION
Patient: Inga Sams    Evaluation Method: In-person visit    Procedure Information    Date: 02/21/24  Procedure: Labor Analgesia         Relevant Problems   Neuro/Psych   (+) Reactive depression      Infectious Disease   (+) COVID-19 affecting pregnancy in first trimester   (+) Onychomycosis       Clinical information reviewed:    Allergies  Meds               NPO Detail:  No data recorded     OB/GYN     Physical Exam    Airway  Mallampati: II  TM distance: <3 FB  Neck ROM: full     Cardiovascular   Rhythm: regular  Rate: normal     Dental    Pulmonary   Breath sounds clear to auscultation     Abdominal - normal exam             Anesthesia Plan    History of general anesthesia?: yes  History of complications of general anesthesia?: no    ASA 2     epidural     The patient is not a current smoker.  Patient was not previously instructed to abstain from smoking on day of procedure.  Patient did not smoke on day of procedure.    Anesthetic plan and risks discussed with patient.  Use of blood products discussed with patient who consented to blood products.

## 2024-02-22 NOTE — DISCHARGE INSTRUCTIONS
Post Birth Warning Signs  Any woman can have complications after the birth of a baby including a blood clot, a heart problem, hypertensive disorder/eclampsia, depression, hemorrhage, or infection. Notify all providers of your delivery date up to one year after birth.*       Call 911 or go to nearest emergency room right away if you have: PAIN or pressure in chest; OBSTRUCTED breathing or shortness of breath; SEIZURES; THOUGHTS of hurting yourself or your baby; heart palpitations/racing; change in alertness/confusion.    Call your provider if you have: BLEEDING, soaking through a pad/hour, or blood clots the size of an egg or bigger; INCISION (episiotomy stitches or  site) that is not healing (increased redness, pain, drainage/pus, or separation); RED or swollen leg/calf that is painful or warm to touch, especially in one leg more than the other; TEMPERATURE of 100.4 F or higher or chills; HEADACHE that does not get better with medicine, rest or hydration, or bad headache with vision changes like spots or flashing lights; increased swelling of face, hands or legs; severe cramps or upper right belly pain; red or swollen breast that is painful or warm to touch; an unusual, foul odor from your vaginal discharge; pain, burning, or difficulty during urination; severe constipation (more than 5 days); feelings of depression (such as depressed mood, loss of interest in enjoyable things, unable to care for yourself, trouble sleeping, lack of appetite, or feeling worthless).     If you can’t reach your provider or symptoms worsen, call 911 or go to nearest emergency room.   *Information obtained from [facility]: Save Your Life: Get Care for These POST-BIRTH Warning Signs              Pacifier Use  “The American Academy of Pediatrics recommends that pacifier use is best avoided during the initiation of breastfeeding and used only after breastfeeding is well established.  In some infants, early pacifier use may interfere  with establishment of good breastfeeding practices, whereas in others it may indicate the presence of a breastfeeding problem that requires intervention.  Use of a pacifier may cause problems with latching, and lead to decreased milk supply by missing feeding opportunities.  Pacifiers may be used during painful procedures, but are not otherwise recommended while the infant is learning to breastfeed.”

## 2024-02-22 NOTE — PROGRESS NOTES
Intrapartum Progress Note    Assessment/Plan   Inga Sams is a 33 y.o.  at 39w0d. SADIE: 2024, by Last Menstrual Period. She is admitted for IOL at term.     IOL  - SVE: 3/50/-3  - Previous plan was for CRB, will defer at this time given dilation   - Pit started at 1750. Continue to up-titrate per protocol  - AROM when appropriate   - Delivery Plan: patient desires vaginal delivery, patient counseled on risks and benefits of induction of labor and possibility of c/s for failure of induction or non-reassuring fetal status    HIV Exposure  - Partner HIV +  - On Truvada   - HIV neg yesterday   - Avoid methergine     Maternal well being  - Depression- Continue Zoloft 50mg  - Hx of ASD, surgery as a child  - GBS pos- PCN  - ppBC: ppIUD (Liletta in room)      Fetal Well-Being  -CEFM, cat 1 currently  - Fetal VSD, for non urgent peds cards consult PP   - EFW: 7#     To be seen & dw. Dr. Virgil Morrison MD  PGY-1, Obstetrics & Gynecology   [facility]       Principal Problem:    Labor and delivery indication for care or intervention    gangsted  Problems (from 10/19/23 to present)       Problem Noted Resolved    Labor and delivery indication for care or intervention 2024 by Masha Deleon MD No    Priority:  Medium      Fetal cardiac anomaly affecting pregnancy, antepartum 2023 by Mariam Stover MD No    Priority:  Medium      Overview Signed 2023 11:00 AM by Mariam Stover MD     Possible tiny apical muscular ventricular septal defect.   code:1: non-urgent peds cardiology consult prior to discharge or within 1-2 weeks if delivered at an outside hospital  Per peds cardiology:  No changes to prenatal care.   Further fetal cardiac imaging is not required at this time.  We would be happy to see Ms. Inga Sams in the future if new concerns arise.     Triage code 1:   No changes to delivery planning.  Delivery per obstetrics at patient´s preferred  hospital.  Standard  care per  team.  A non-urgent pediatric cardiology consult should be performed prior to hospital discharge or as an outpatient in 1-2 weeks if delivering at an outside hospital.  Can be performed sooner if there are any clinical concerns.           History of maternal cardiac surgery 10/19/2023 by Mariam Stover MD No    Priority:  Medium      Overview Addendum 2024 10:41 AM by Mariam Stover MD     Mesh implants for repair  Fetal echo needed- scheduled   Maternal cardiology consult ordered- normal maternal echo EF 55-60%  Follow growth.         COVID-19 affecting pregnancy in first trimester 10/19/2023 by Mariam Stover MD No    Priority:  Medium      Overview Signed 10/19/2023  7:25 AM by Mariam Stover MD     Diagnosed 23  Will need growth at 30, 36 weeks           Reactive depression 10/19/2023 by Mariam Stover MD No    Priority:  Medium      Overview Addendum 2023 11:21 AM by Mariam Stover MD     On Sertraline         Prenatal care, subsequent pregnancy in third trimester 10/19/2023 by Mariam Stover MD No    Priority:  Medium      Overview Signed 10/19/2023  7:27 AM by Mariam Stover MD     S/p rr cf DNA         Exposure to HIV 2023 by Kasey Villa MD No    Priority:  Medium      Overview Signed 10/19/2023  7:22 AM by Mariam Stover MD      with HIV, On Truvada                 Subjective   Patient resting with partner at bedside. Amenable to cervical exam    Objective   Last Vitals:  Temp Pulse Resp BP MAP Pulse Ox   37.1 °C (98.8 °F) 81 18 139/81   97 %     Vitals Min/Max Last 24 Hours:  Temp  Min: 36.8 °C (98.2 °F)  Max: 37.1 °C (98.8 °F)  Pulse  Min: 75  Max: 102  Resp  Min: 18  Max: 18  BP  Min: 129/75  Max: 143/86    Intake/Output:  No intake or output data in the 24 hours ending 24    Physical Examination:  Genitourinary:      Vulva normal.   Cardiovascular:       Rate and Rhythm: Normal rate.   Pulmonary:      Effort: Pulmonary effort is normal.      Breath sounds: Normal breath sounds.   Abdominal:      Palpations: Abdomen is soft.      Comments: Gravid, non-tender to palpation  Neurological:      General: No focal deficit present.      Mental Status: She is alert.   Skin:     General: Skin is warm and dry.   Psychiatric:         Mood and Affect: Mood normal.         Behavior: Behavior normal.       SVE: 3/50/-3    Lab Review:  Lab Results   Component Value Date    WBC 9.1 02/21/2024    HGB 11.4 (L) 02/21/2024    HCT 33.8 (L) 02/21/2024     02/21/2024

## 2024-02-22 NOTE — SIGNIFICANT EVENT
Labor Check    S/P AROM w/ bloody show    Cervical Exam  Dilation: 5  Effacement (%): 50  Fetal Station: -2  Method: Manual  OB Examiner: Prince GARCIA  Fetal Assessment  Movement: Present  Mode: External US  Baseline Fetal Heart Rate (bpm): 125 bpm  Baseline Classification: Normal  Variability: Moderate (Between 6 and 25 BPM)  Pattern: Accelerations  Pattern Observations: patient is up in the restroom  FHR Category: Category I  Multiple Births: No      Contraction Frequency: 1-3     A/P    Latent Labor  - Titrate pitocin per protocol, currently at 12  - s/p AROM 0100  - Epidural infusing  - CEFM; Cat 1 currently    Pt seen and discussed with Dr. Morrison and Dr. Pretty Edwards MD  Family Medicine - PGY 1

## 2024-02-22 NOTE — ANESTHESIA PROCEDURE NOTES
Epidural Block    Patient location during procedure: OB  Start time: 2/21/2024 11:36 PM  End time: 2/21/2024 11:42 PM  Reason for block: labor analgesia  Staffing  Performed: resident   Authorized by: Marco A Lopez MD    Performed by: Marco A Lopez MD    Preanesthetic Checklist  Completed: patient identified, IV checked, risks and benefits discussed, surgical consent, monitors and equipment checked, pre-op evaluation, timeout performed and sterile techniques followed  Block Timeout  RN/Licensed healthcare professional reads aloud to the Anesthesia provider and entire team: Patient identity, procedure with side and site, patient position, and as applicable the availability of implants/special equipment/special requirements.    Timeout performed at: 2/21/2024 11:36 PM  Block Placement  Patient position: sitting  Prep: ChloraPrep  Sterility prep: cap, drape, gloves and mask  Sedation level: no sedation  Patient monitoring: blood pressure, continuous pulse oximetry and heart rate  Approach: midline  Local numbing: lidocaine 1% to skin and subcutaneous tissues  Vertebral space: lumbar  Lumbar location: L3-L4  Epidural  Loss of resistance technique: saline  Guidance: landmark technique        Needle  Needle type: Tuohy   Needle gauge: 17  Needle length: 8.9cm  Needle insertion depth: 6 cm  Catheter type: multi-orifice  Catheter size: 19 G  Catheter at skin depth: 11 cm  Catheter securement method: clear occlusive dressing and liquid medical adhesive    Test dose: lidocaine 1.5% with epinephrine 1-to-200,000  Test dose: lidocaine 1.5% with epinephrine 1-to-200,000  Test dose result: no positive test dose    PCEA  Medication concentration used: 0.044% Bupivacaine with 1.25 mcg/mL Fentanyl and [id number] Epinephrine  Dose (mL): 10  Lockout (minutes): 15  1-Hour Limit (boluses/hr): 4  Basal Rate: 14        Assessment  Sensory level: T10 bilateral  Block outcome: pain improved  Number of attempts: 1  Events: no positive test  dose  Procedure assessment: patient tolerated procedure well with no immediate complications

## 2024-02-23 VITALS
TEMPERATURE: 97.2 F | HEIGHT: 62 IN | BODY MASS INDEX: 32.98 KG/M2 | DIASTOLIC BLOOD PRESSURE: 85 MMHG | SYSTOLIC BLOOD PRESSURE: 119 MMHG | RESPIRATION RATE: 16 BRPM | HEART RATE: 76 BPM | WEIGHT: 179.23 LBS | OXYGEN SATURATION: 98 %

## 2024-02-23 PROCEDURE — 2500000001 HC RX 250 WO HCPCS SELF ADMINISTERED DRUGS (ALT 637 FOR MEDICARE OP)

## 2024-02-23 PROCEDURE — 2500000002 HC RX 250 W HCPCS SELF ADMINISTERED DRUGS (ALT 637 FOR MEDICARE OP, ALT 636 FOR OP/ED)

## 2024-02-23 RX ADMIN — ACETAMINOPHEN 975 MG: 325 TABLET ORAL at 08:36

## 2024-02-23 RX ADMIN — ACETAMINOPHEN 975 MG: 325 TABLET ORAL at 02:16

## 2024-02-23 RX ADMIN — SERTRALINE HYDROCHLORIDE 50 MG: 50 TABLET ORAL at 08:37

## 2024-02-23 RX ADMIN — EMTRICITABINE AND TENOFOVIR DISOPROXIL FUMARATE 1 TABLET: 200; 300 TABLET, FILM COATED ORAL at 08:37

## 2024-02-23 RX ADMIN — IBUPROFEN 600 MG: 600 TABLET, FILM COATED ORAL at 08:37

## 2024-02-23 RX ADMIN — IBUPROFEN 600 MG: 600 TABLET, FILM COATED ORAL at 02:16

## 2024-02-23 ASSESSMENT — PAIN SCALES - GENERAL
PAINLEVEL_OUTOF10: 2
PAINLEVEL_OUTOF10: 2

## 2024-02-23 NOTE — L&D DELIVERY NOTE
OB Delivery Note  2024  Inga Sams  33 y.o.   Vaginal, Spontaneous        Gestational Age: 39w1d  /Para:   Quantitative Blood Loss: Admission to Discharge: 204 mL (2024  1:39 PM - 2024  8:20 PM)    Ondina Sams [[id number]]      Labor Events    Rupture date/time: 2024 0103  Labor type: Induced Onset of Labor  Labor allowed to proceed with plans for an attempted vaginal birth?: Yes  Induction: AROM, Oxytocin  Complications: None       Labor Event Times    Dilation complete date/time: 202434  Start pushing date/time: 202439       Labor Length    2nd stage: 0h 12m  3rd stage: 0h 07m       Placenta    Placenta delivery date/time: 202453  Placenta removal: Expressed  Placenta appearance: Intact  Placenta disposition: discarded       Cord    Vessels: 3 vessels  Complications: None  Delayed cord clamping?: Yes  Gases sent?: No  Stem cell collection (by provider): No       Lacerations    Episiotomy: None  Perineal laceration: None  Other lacerations?: No  Repair suture: None       Anesthesia    Method: Epidural       Operative Delivery    Forceps attempted?: No  Vacuum extractor attempted?: No       Shoulder Dystocia    Shoulder dystocia present?: No        Delivery    Time head delivered: 2024 06:46:00  Birth date/time: 2024 06:46:00  Delivery type: Vaginal, Spontaneous  Complications: None       Resuscitation    Method: Suctioning, Tactile stimulation       Apgars    Living status: Living  Apgar Component Scores:  1 min.:  5 min.:  10 min.:  15 min.:  20 min.:    Skin color:  0  1       Heart rate:  2  2       Reflex irritability:  2  2       Muscle tone:  2  2       Respiratory effort:  2  2       Total:  8  9       Apgars assigned by: HUBERT RIVERA RN       Delivery Providers    Delivering clinician: Chris Olsen MD   Provider Role    Malini Alonzo RN Delivery Nurse    Marti Rivera RN Nursery Nurse    Dana Morrison MD  Resident                 Uncomplicated . Following delivery, a Liletta IUD was placed using a ring forcep under ultrasound guidance. Placement at the fundus was confirmed with ultrasound. The strings were then trimmed. Patient tolerated the procedure well.    Dr. Olsen was present for all key portions of the delivery and IUD placement.     RAMOS Morrison MD  PGY-1, Obstetrics & Gynecology   [facility]

## 2024-02-23 NOTE — DISCHARGE SUMMARY
Discharge Summary    Admission Date: 2024  Discharge Date: 24  Discharge Diagnosis: Labor and delivery indication for care or intervention     Patient Active Problem List   Diagnosis    Onychomycosis    Exposure to HIV    History of spontaneous     History of maternal cardiac surgery    COVID-19 affecting pregnancy in first trimester    Reactive depression    Prenatal care, subsequent pregnancy in third trimester    Fetal cardiac anomaly affecting pregnancy, antepartum    Labor and delivery indication for care or intervention       Hospital Course  Inga Sams is a 33 y.o.,     Initially presented for: rrIOL    Admission Date: 2024    Delivery Date: 2024  6:46 AM     Delivery type: Vaginal, Spontaneous      GA at delivery: 39w1d    Outcome: Living     Anesthesia during delivery: Epidural     Intrapartum complications: None     Feeding method: Breastfeeding Status: Yes    Contraception: Post-placental IUD    Rhogam: The patient's blood type is O POS. The baby's blood type is O POS . Rhogam is not indicated.     Now postpartum day: 1.    Hospital course n/f:    PP course uneventful.  Meeting all postpartum milestones- ambulating independently, passing flatus, tolerating PO intake, lochia light, voiding spontaneously, and pain well controlled with PO meds.     Dispo  - OK for DC today per pt preference  - 6 week postpartum follow-up with prenatal provider.     Pertinent Physical Exam At Time of Discharge  General: well appearing, well nourished, postpartum  Obstetric: fundus firm below umbilicus, lochia light  Skin: Warm, dry; no rashes/lesions/erythema  Breast: No masses, nipple discharge  Neuro: A/Ox3, conversational, no gross motor deficit   GI: no distension, appropriately tender, soft, +BS  Respiratory: Even and unlabored on RA, LSCTA BL  Cardiovascular: Trace BLE edema; No erythema, warmth  Psych: appropriate mood and affect       Your medication list        CONTINUE taking  these medications        Instructions Last Dose Given Next Dose Due   emtricitabine-tenofovir (TDF) 200-300 mg tablet  Commonly known as: Truvada      TAKE 1 TABLET DAILY       PRENATAL ORAL           sertraline 50 mg tablet  Commonly known as: Zoloft                  STOP taking these medications      ondansetron 4 mg tablet  Commonly known as: Zofran                   Outpatient Follow-Up  Future Appointments   Date Time Provider Department Center   4/4/2024  9:15 AM Mariam Stover MD [id number]-CNP

## 2024-02-23 NOTE — ANESTHESIA POSTPROCEDURE EVALUATION
Inga Sams is a 33 y.o., , who had a Vaginal, Spontaneous  delivery on 2024  at 39w1d and is now POD1.    She had Neuraxial Anesthesia without immediate complications noted.       Pain well controlled    Vitals:    24 0400   BP: 115/75   Pulse: 72   Resp: 18   Temp: 36.6 °C (97.9 °F)   SpO2: 95%       Neuraxial site assessed. No visible redness or swelling or drainage. Patient able to ambulate and move all extremities without difficulty. Able to void. No complaints of nausea/vomiting. Tolerating PO intake well. No s/sx of PDPH.     Anesthesia will sign off     Luis Enrique Bailey CAA

## 2024-02-23 NOTE — LACTATION NOTE
Lactation Consultant Note  Lactation Consultation  Reason for Consult: Initial assessment  Consultant Name: Kasey Franklin    Maternal Information  Has mother  before?: Yes  Infant to breast within first 2 hours of birth?: Yes  Exclusive Pump and Bottle Feed: No    Maternal Assessment  Breast Assessment: Medium, Symmetrical, Filling  Nipple Assessment: Intact, Short  Areola Assessment: Normal    Infant Assessment  Infant Behavior: Fussy    Feeding Assessment  Nutrition Source: Breastmilk  Feeding Method: Nursing at the breast  Feeding Position: Skin to skin, Cross - cradle  Suck/Feeding: Sustained  Latch Assessment: Instructed on deep latch, Comfortable with no pain, Sucks with long jaw movement    LATCH TOOL  Latch: Repeated attempts, hold nipple in mouth, stimulate to suck  Audible Swallowing: Spontaneous and intermittent (24 hours old)  Type of Nipple: Everted (After stimulation)  Comfort (Breast/Nipple): Filling, red/small blisters/bruises, mild/moderate discomfort  Hold (Positioning): Minimal assist, teach one side, mother does other, staff holds  LATCH Score: 7    Patient Follow-up  Inpatient Lactation Follow-up Needed : Yes  Outpatient Lactation Follow-up: Recommended    Recommendations/Summary  This mom is taking Truvada to prevent HIV infection, as Dad is HIV positive. Mom is HIV negative. Per [facility]re's Medications and Mother's Milk and per our  pharmacy, it is safe for baby to breastfeed while mom is taking Truvada in this circumstance.     Mom  her oldest child for 5 months and exclusively pumped for several months with her second child. She made the decision to pump for her second baby because she was very small and mom wanted to see the volume of milk she was taking. Mom plans to latch this baby to the breast. So far, baby has latched well to the breast per mom. Mom says that she is much more awake and feeds better at night and is sleepier during the day. Mom was attempting to wake her up  to feed when I entered the room. We set mom up to latch baby in cross cradle hold at the left breast. Baby was able to latch easily to the breast but would release the breast and cry after about 1-2 minutes of consistent sucking. She would then root around for the nipple again and repeat the same pattern. Mom said that baby was able to sustain the latch well overnight for at least 15-20 minutes. Baby was also bunching up her legs and had a high pitched cry, which indicated to me that she may be gassy. Parents said that she had a small stool this morning but that it had been a while since she had a large stool. I tried to burp baby but was not successful. We moved baby to the right breast and baby was able to successfully latch in cradle hold and sustained the latch for several minutes before unlatching and becoming fussy again. Baby did pass gas when she was latched to this breast. Baby sucks well on my finger and does not appear to have a tongue tie and did not have difficulty sustaining the latch overnight, so the parents and I believe she was experiencing some GI discomfort during this feed. I encouraged parents to call out for assistance with the next feed. Mom has a pump for at home. We reviewed the outpatient lactation information.

## 2024-02-23 NOTE — CARE PLAN
Problem: Postpartum  Goal: Experiences normal postpartum course  Outcome: Progressing      Pt VSS. Minimal bleeding. Pain is controlled

## 2024-02-25 LAB
BLOOD EXPIRATION DATE: NORMAL
DISPENSE STATUS: NORMAL
PRODUCT BLOOD TYPE: 5100
PRODUCT CODE: NORMAL
UNIT ABO: NORMAL
UNIT NUMBER: NORMAL
UNIT RH: NORMAL
UNIT VOLUME: 276
XM INTEP: NORMAL

## 2024-02-29 ENCOUNTER — APPOINTMENT (OUTPATIENT)
Dept: OBSTETRICS AND GYNECOLOGY | Facility: CLINIC | Age: 34
End: 2024-02-29
Payer: COMMERCIAL

## 2024-03-01 ENCOUNTER — TELEPHONE (OUTPATIENT)
Dept: OBSTETRICS AND GYNECOLOGY | Facility: CLINIC | Age: 34
End: 2024-03-01
Payer: COMMERCIAL

## 2024-03-01 NOTE — TELEPHONE ENCOUNTER
Contacted pt and verified name and .  Pt is 8 days post partum.  Pt states she can feel her Iud strings. Pt denies cramping and bleeding is not bothersome. Denies dizziness and headache.  Pt schedule an appt for 3/14/24 with Yasmeen Childs, pt also on waitlist to be seen sooner if possible.  Pt advised: to return to labor and delivery if she starts to experience heavy bleeding, soaking a pad an hour, or severe pain or cramping.   Message also sent to Dr. Castillo  Pt states understanding and denies having questions at this time.

## 2024-03-04 NOTE — TELEPHONE ENCOUNTER
Pt made aware of the message below      Saqib Handy MD  You17 hours ago (4:06 PM)       Following up with Yasmeen Childs is fine. Tell the patient to try to ignore the strings-- they just may need to be trimmed (sometimes as the uterus involutes the strings will lengthen).  Expulsion, though, is more common with PP placement so if she notes it falls out that's not an emergency and she can still follow up with Yasmeen. Thanks-- ES

## 2024-03-14 ENCOUNTER — OFFICE VISIT (OUTPATIENT)
Dept: OBSTETRICS AND GYNECOLOGY | Facility: CLINIC | Age: 34
End: 2024-03-14
Payer: COMMERCIAL

## 2024-03-14 VITALS
HEIGHT: 63 IN | SYSTOLIC BLOOD PRESSURE: 120 MMHG | DIASTOLIC BLOOD PRESSURE: 66 MMHG | WEIGHT: 158 LBS | BODY MASS INDEX: 28 KG/M2

## 2024-03-14 DIAGNOSIS — Z30.431 IUD CHECK UP: ICD-10-CM

## 2024-03-14 PROCEDURE — 1036F TOBACCO NON-USER: CPT

## 2024-03-14 PROCEDURE — 58301 REMOVE INTRAUTERINE DEVICE: CPT

## 2024-03-14 ASSESSMENT — ENCOUNTER SYMPTOMS
ENDOCRINE NEGATIVE: 0
CONSTITUTIONAL NEGATIVE: 0
NEUROLOGICAL NEGATIVE: 0
RESPIRATORY NEGATIVE: 0
CARDIOVASCULAR NEGATIVE: 0
GASTROINTESTINAL NEGATIVE: 0
EYES NEGATIVE: 0
PSYCHIATRIC NEGATIVE: 0
MUSCULOSKELETAL NEGATIVE: 0
ALLERGIC/IMMUNOLOGIC NEGATIVE: 0
HEMATOLOGIC/LYMPHATIC NEGATIVE: 0

## 2024-03-14 ASSESSMENT — PAIN SCALES - GENERAL: PAINLEVEL: 0-NO PAIN

## 2024-03-14 NOTE — PROGRESS NOTES
"Assessment/Plan   Diagnoses and all orders for this visit:  IUD check up  Other orders  -     IUD Removal    Patient plans to have IUD replaced at 6 week postpartum visit, already scheduled.   Encouraged to reach out to our office with any questions or concerns.   Encouraged patient to follow up with routine postpartum visit and PRN    ALKA Dong     Subjective   Inga Sams is a 33 y.o. female who is here for IUD check. Patient reports irritation for IUD strings. IUD was places immediately postpartum on  2/22/24.   Patient reports that she is doing well postpartum and has good support at home.     Objective   /66 (BP Location: Right arm, Patient Position: Sitting, BP Cuff Size: Adult)   Ht 1.6 m (5' 3\")   Wt 71.7 kg (158 lb)   LMP 05/24/2023   BMI 27.99 kg/m²     4 inches of IUD strings visible outside of vaginal introitus. IUD sitting in vaginal vault.   General:   Alert and oriented x 3   Vulva: EGBUS normal   Vagina: Pink, normal discharge. IUD visualized in vaginal vault.    Cervix: No CMT, closed.        IUD Removal    Date/Time: 3/14/2024 12:24 PM    Performed by: ALKA Dong  Authorized by: ALKA Dong    Consent:     Consent obtained:  Verbal    Consent given by:  Patient    Procedure risks and benefits discussed: yes      Patient agrees, verbalizes understanding, and wants to proceed: yes    Procedure:     Removed with no complications: yes      Removal due to mechanical complications of IUD: yes          "

## 2024-03-25 DIAGNOSIS — Z20.6 EXPOSURE TO HIV: ICD-10-CM

## 2024-03-25 RX ORDER — EMTRICITABINE AND TENOFOVIR DISOPROXIL FUMARATE 200; 300 MG/1; MG/1
1 TABLET, FILM COATED ORAL DAILY
Qty: 90 TABLET | Refills: 3 | Status: SHIPPED | OUTPATIENT
Start: 2024-03-25

## 2024-04-04 ENCOUNTER — POSTPARTUM VISIT (OUTPATIENT)
Dept: OBSTETRICS AND GYNECOLOGY | Facility: CLINIC | Age: 34
End: 2024-04-04
Payer: COMMERCIAL

## 2024-04-04 VITALS
DIASTOLIC BLOOD PRESSURE: 84 MMHG | BODY MASS INDEX: 28.52 KG/M2 | WEIGHT: 155 LBS | SYSTOLIC BLOOD PRESSURE: 108 MMHG | HEIGHT: 62 IN

## 2024-04-04 DIAGNOSIS — Z20.6 EXPOSURE TO HIV: ICD-10-CM

## 2024-04-04 ASSESSMENT — EDINBURGH POSTNATAL DEPRESSION SCALE (EPDS)
THE THOUGHT OF HARMING MYSELF HAS OCCURRED TO ME: NEVER
I HAVE BLAMED MYSELF UNNECESSARILY WHEN THINGS WENT WRONG: YES, SOME OF THE TIME
THINGS HAVE BEEN GETTING ON TOP OF ME: NO, MOST OF THE TIME I HAVE COPED QUITE WELL
I HAVE BEEN ABLE TO LAUGH AND SEE THE FUNNY SIDE OF THINGS: AS MUCH AS I ALWAYS COULD
I HAVE FELT SAD OR MISERABLE: NOT VERY OFTEN
I HAVE BEEN SO UNHAPPY THAT I HAVE BEEN CRYING: NO, NEVER
I HAVE LOOKED FORWARD WITH ENJOYMENT TO THINGS: AS MUCH AS I EVER DID
I HAVE BEEN SO UNHAPPY THAT I HAVE HAD DIFFICULTY SLEEPING: NOT AT ALL
TOTAL SCORE: 6
I HAVE FELT SCARED OR PANICKY FOR NO GOOD REASON: NO, NOT MUCH
I HAVE BEEN ANXIOUS OR WORRIED FOR NO GOOD REASON: HARDLY EVER

## 2024-04-04 NOTE — PROGRESS NOTES
Post Partum Visit  Subjective    Inga Sams is a 33 y.o.  presenting for postpartum follow-up:    Delivery Date: 2024   GA at Delivery: 39.1  Type of Delivery: Vaginal, Spontaneous      Pregnancy was complicated by:  gangsted  Problems (from 10/19/23 to present)       Problem Noted Resolved    Fetal cardiac anomaly affecting pregnancy, antepartum 2023 by Mariam Stover MD No    Priority:  Medium      Overview Signed 2023 11:00 AM by Mariam Stover MD     Possible tiny apical muscular ventricular septal defect.   code:1: non-urgent peds cardiology consult prior to discharge or within 1-2 weeks if delivered at an outside hospital  Per peds cardiology:  No changes to prenatal care.   Further fetal cardiac imaging is not required at this time.  We would be happy to see Ms. Inga Sams in the future if new concerns arise.     Triage code 1:   No changes to delivery planning.  Delivery per obstetrics at patient´s preferred hospital.  Standard  care per  team.  A non-urgent pediatric cardiology consult should be performed prior to hospital discharge or as an outpatient in 1-2 weeks if delivering at an outside hospital.  Can be performed sooner if there are any clinical concerns.           History of maternal cardiac surgery 10/19/2023 by Mariam Stover MD No    Priority:  Medium      Overview Addendum 2024 10:41 AM by Mariam Stover MD     Mesh implants for repair  Fetal echo needed- scheduled   Maternal cardiology consult ordered- normal maternal echo EF 55-60%  Follow growth.         COVID-19 affecting pregnancy in first trimester 10/19/2023 by Mariam Stover MD No    Priority:  Medium      Overview Signed 10/19/2023  7:25 AM by Mariam Stover MD     Diagnosed 23  Will need growth at 30, 36 weeks           Reactive depression 10/19/2023 by Mariam Stover MD No    Priority:  Medium      Overview Addendum 2023  11:21 AM by Mariam Stover MD     On Sertraline         Prenatal care, subsequent pregnancy in third trimester 10/19/2023 by Mariam Stover MD No    Priority:  Medium      Overview Signed 10/19/2023  7:27 AM by Mariam Stover MD     S/p rr cf DNA         Exposure to HIV 2023 by Kasey Villa MD No    Priority:  Medium      Overview Signed 10/19/2023  7:22 AM by Mariam Stover MD      with HIV, On Truvada         Labor and delivery indication for care or intervention 2024 by Masha Deleon MD No          Concerns: No    Pain: No  Lacerations:   Laceration Repaired?   Perineal: None     Periurethral:       Labial:       Sulcus:       Vaginal:       Cervical:           Repair suture: None   Number of repair packets:     Blood loss (mL):     Total estimated blood loss (mL): 0     Menses: No, some spotting.     Sexual Health Concerns: No  Contraceptive Method: IUD    Feeding Method: Yes no breast or nursing problems  Lactation Consult Needed?: No    Birth Trauma: No  Baby:   Information for the patient's :  Sasha Sams [[id number]]   Sasha Sams ,   Information for the patient's :  Sasha Sams [[id number]]   female   Bonding: doing well without issue    Mood:   EPDS 6      Objective   Vitals:    24 0938   BP: 108/84        Physical Exam  Constitutional:       Appearance: Normal appearance. She is obese.   Genitourinary:      Vulva, bladder and urethral meatus normal.      Right Labia: No skin changes or Bartholin's cyst.     Left Labia: No skin changes or Bartholin's cyst.     No vaginal discharge.      No vaginal prolapse present.       Right Adnexa: not tender and no mass present.     Left Adnexa: not tender and no mass present.     Cervix is not parous.      Uterus is not enlarged or tender.      No urethral tenderness present.      Pelvic floor neuro is intact.  Breasts:     Breasts are soft.     Right: Normal.      Left: Normal.   HENT:      Head:  Normocephalic and atraumatic.      Nose: Nose normal.      Mouth/Throat:      Mouth: Mucous membranes are moist.   Eyes:      Conjunctiva/sclera: Conjunctivae normal.   Cardiovascular:      Rate and Rhythm: Normal rate and regular rhythm.   Pulmonary:      Effort: Pulmonary effort is normal.      Breath sounds: Normal breath sounds.   Abdominal:      General: Abdomen is flat. Bowel sounds are normal.      Palpations: Abdomen is soft.   Musculoskeletal:         General: Normal range of motion.      Cervical back: Normal range of motion and neck supple.   Neurological:      General: No focal deficit present.      Mental Status: She is alert and oriented to person, place, and time.   Skin:     General: Skin is warm and dry.   Psychiatric:         Mood and Affect: Mood normal.         Behavior: Behavior normal.         Thought Content: Thought content normal.         Judgment: Judgment normal.   Vitals reviewed.        Assessment/Plan   Problem List Items Addressed This Visit             ICD-10-CM    Exposure to HIV Z20.6    Relevant Orders    HIV 1/2 Antigen/Antibody Screen with Reflex to Confirmation     Other Visit Diagnoses         Codes    Routine postpartum follow-up    -  Primary Z39.2        Liletta placed post-placentally. IUD fell out after 2 weeks.     Follow up: 2-3 weeks for IUD placement.  Pap due 2026  Continue Shakeel

## 2024-06-05 DIAGNOSIS — F32.9 REACTIVE DEPRESSION: Primary | ICD-10-CM

## 2024-06-05 RX ORDER — SERTRALINE HYDROCHLORIDE 50 MG/1
50 TABLET, FILM COATED ORAL DAILY
Qty: 90 TABLET | Refills: 3 | Status: SHIPPED | OUTPATIENT
Start: 2024-06-05

## 2024-08-19 ENCOUNTER — TELEPHONE (OUTPATIENT)
Dept: OBSTETRICS AND GYNECOLOGY | Facility: CLINIC | Age: 34
End: 2024-08-19
Payer: COMMERCIAL

## 2024-08-19 DIAGNOSIS — N61.0 MASTITIS: ICD-10-CM

## 2024-08-19 RX ORDER — DICLOXACILLIN SODIUM 250 MG/1
250 CAPSULE ORAL EVERY 6 HOURS
Qty: 40 CAPSULE | Refills: 0 | Status: SHIPPED | OUTPATIENT
Start: 2024-08-19 | End: 2024-08-29

## 2024-08-19 NOTE — TELEPHONE ENCOUNTER
Contacted pt  Name and  verified  Pt aware rx is being sent to pharmacy   Pt verbalized understanding.  No further questions or concerns at this time.

## 2024-11-18 ENCOUNTER — LAB (OUTPATIENT)
Dept: LAB | Facility: LAB | Age: 34
End: 2024-11-18
Payer: COMMERCIAL

## 2024-11-18 DIAGNOSIS — Z20.6 EXPOSURE TO HIV: ICD-10-CM

## 2024-11-18 LAB — HIV 1+2 AB+HIV1 P24 AG SERPL QL IA: NONREACTIVE

## 2024-11-18 PROCEDURE — 36415 COLL VENOUS BLD VENIPUNCTURE: CPT

## 2024-11-18 PROCEDURE — 87389 HIV-1 AG W/HIV-1&-2 AB AG IA: CPT

## 2024-12-09 ENCOUNTER — OFFICE VISIT (OUTPATIENT)
Dept: SPORTS MEDICINE | Facility: HOSPITAL | Age: 34
End: 2024-12-09
Payer: COMMERCIAL

## 2024-12-09 ENCOUNTER — HOSPITAL ENCOUNTER (OUTPATIENT)
Dept: RADIOLOGY | Facility: HOSPITAL | Age: 34
Discharge: HOME | End: 2024-12-09
Payer: COMMERCIAL

## 2024-12-09 VITALS — BODY MASS INDEX: 27.23 KG/M2 | HEART RATE: 81 BPM | OXYGEN SATURATION: 100 % | HEIGHT: 62 IN | WEIGHT: 148 LBS

## 2024-12-09 DIAGNOSIS — M75.21 BICEPS TENDINITIS OF RIGHT SHOULDER: ICD-10-CM

## 2024-12-09 DIAGNOSIS — M25.511 RIGHT ANTERIOR SHOULDER PAIN: ICD-10-CM

## 2024-12-09 DIAGNOSIS — M75.41 SHOULDER IMPINGEMENT SYNDROME, RIGHT: ICD-10-CM

## 2024-12-09 DIAGNOSIS — M75.81 ROTATOR CUFF TENDINITIS, RIGHT: Primary | ICD-10-CM

## 2024-12-09 PROCEDURE — 73030 X-RAY EXAM OF SHOULDER: CPT | Mod: RIGHT SIDE | Performed by: RADIOLOGY

## 2024-12-09 PROCEDURE — 1036F TOBACCO NON-USER: CPT | Performed by: PEDIATRICS

## 2024-12-09 PROCEDURE — 99204 OFFICE O/P NEW MOD 45 MIN: CPT | Performed by: PEDIATRICS

## 2024-12-09 PROCEDURE — 3008F BODY MASS INDEX DOCD: CPT | Performed by: PEDIATRICS

## 2024-12-09 PROCEDURE — 99214 OFFICE O/P EST MOD 30 MIN: CPT | Performed by: PEDIATRICS

## 2024-12-09 PROCEDURE — 73030 X-RAY EXAM OF SHOULDER: CPT | Mod: RT

## 2024-12-09 ASSESSMENT — PAIN - FUNCTIONAL ASSESSMENT: PAIN_FUNCTIONAL_ASSESSMENT: 0-10

## 2024-12-09 ASSESSMENT — PAIN SCALES - GENERAL: PAINLEVEL_OUTOF10: 4

## 2024-12-09 NOTE — PROGRESS NOTES
"Chief Complaint   Patient presents with    Right Shoulder - Pain     Consulting physician: Kasey Villa MD    A report with my findings and recommendations will be sent to the primary and referring physician via written or electronic means when information is available    History of Present Illness:  Inga Sams is a 34 y.o. 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    She reports 2 months of right anterior shoulder pain without an inciting injury or trauma. She notes that they have heavy doors at home she pulls repetitively. She also has to often hold her youngest child in her left arm and does things with her right arm. The pain initially radiated to her upper arm. The pain has persisted and she notes it when making certain movements with her right arm/shoulder. It does not wake her up at night, but she cannot sleep on her right side. She is not taking any medications for the pain. No numbness or tingling. No prior right shoulder injuries.    Past [facility] HX:  Specialty Problems    None    ROS  12 point ROS reviewed and is negative except for items listed: None    Social Hx:  Home:  Lives with  and 3 children  Sports: Running    Medications:   Current Outpatient Medications on File Prior to Visit   Medication Sig Dispense Refill    emtricitabine-tenofovir, TDF, (Truvada) 200-300 mg tablet TAKE 1 TABLET DAILY 90 tablet 3    PNV no.95/ferrous fum/folic ac (PRENATAL ORAL) Take by mouth.      sertraline (Zoloft) 50 mg tablet TAKE 1 TABLET DAILY AS DIRECTED 90 tablet 3     No current facility-administered medications on file prior to visit.       Allergies:  No Known Allergies     Physical Exam:    Visit Vitals  Pulse 81   Ht 1.575 m (5' 2\")   Wt 67.1 kg (148 lb)   SpO2 100%   BMI 27.07 kg/m²   OB Status Recent pregnancy   Smoking Status Never   BSA 1.71 m²      Vitals reviewed    General appearance: Well-appearing well-nourished  Psych: Normal mood and affect    Neuro: Normal " sensation to light touch throughout the involved extremities  Vascular: No extremity edema or discoloration.  Skin: negative.  Lymphatic: no regional lymphadenopathy present.  Eyes: no conjunctival injection.    BILATERAL SHOULDER EXAM    Inspection:  Posture: Rounded shoulder posture  Erythema: No   Swelling/bruising: No   Muscle atrophy No     Range of motion:   Abduction (180) full, pain free   Extension (40) full, pain free   Adduction (20-40) full, pain free   Forward flexion (160-170) full, pain free   Internal rotation in adduction (80-90) full, pain free   Internal rotation in abduction (50-70) full, pain free L, painful R  External rotation in adduction (90) full, pain free   External rotation in abduction () full, pain free L, painful R    Scapular function: normal     Cervical spine   Flexion (50-70) full, no pain   Extension (60-85)) full, no pain  Lateral bend R (40-50) full, no pain   Lateral bend L (40-50) full, no pain   Lateral rotation R (60-75) full, no pain   Lateral rotation L (60-75) full, no pain     Shoulder Palpation:   TTP SC joint no   TTP clavicle  no   TTP Bicipital groove no   TTP AC joint no   TTP humeral head no   TTP anterior joint line  no   TTP posterior joint line  no   TTP scapula no     TTP deltoids no   TTP trapezius no   TTP rhomboids no   TTP teres minor or infraspinatus no   TTP supraspinatus no   TTP pectoralis no   TTP biceps  no   TTP triceps  no     TTP midline cervical spine no   TTP paraspinous muscles no    Strength:   Supraspinatus pain free, 5/5  Infraspinatus and teres minor pain free L, painful R, 5/5  Subscapularis pain free L, painful R, 5/5  Deltoid pain free, 5/5  Latissimus pain free, 5/5    Normal sensation:  C8 dermatome/ulnar nerve: small finger   C7 dermatome/meidan nerve: middle finger   C6 dermatome/radial nerve: thumb   C5 dermatome/axillary nerve: deltoid patch     Impingement tests:   Hawkin's: Negative L, + R  Neer's: Negative     AC joint:  crossover adduction: Negative     Biceps tendon tests:   Speeds: Negative L, Positive R  Yergason's: Negative L, Positive R    Stability testing:   Apprehension: Negative   Relocation: Negative   Anterior glide: Negative   Posterior glide: Negative   Sulcus: Negative    Labral tests:  Obrein's: negative   Clunk: negative   Shift: negative       Imaging:  Right shoulder x-rays obtained today on 12/9/2024 demonstrate no acute fracture, dislocation, malalignment, or osseous abnormality.    Imaging was personally interpreted and reviewed with the patient and/or family    Impression and Plan:  Inga Sams is a 34 y.o. 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   Exam with rounded shoulder posture, R shoulder pain with internal and external rotation in abduction, pain with R infraspinatus and subscapularis testing, and positive Hawkin's, Speeds and Yergason's on the right.   We provided home exercises for her to work on and provided a referral to physical therapy. Voltaren three times a day for pain/inflammation (breastfeeding) Follow-up as needed. Can perform injection of SA bursa if needed    Prasanna Espinoza MD, [facility]  Primary Care Sports Medicine Fellow, PGY-4  [facility]    I saw and evaluated the patient. I personally obtained the key and critical portions of the history and physical exam or was physically present for key and critical portions performed by the resident/fellow. I reviewed the resident/fellow's documentation and discussed the patient with the resident/fellow. I agree with the resident/fellow's medical decision making as documented in the note.    ** Please excuse any errors in grammar or translation related to this dictation. Voice recognition software was utilized to prepare this document. **

## 2024-12-09 NOTE — PATIENT INSTRUCTIONS
shoulders  Access Code: [email or web address]  URL: [email or web address]  Date: 09/18/2023  Prepared by: Kirti Valdez MD    Exercises  - Standing Diagonal Shoulder Extension with Anchored Resistance  - 1 x daily - 3 x weekly - 3 sets - 10-25 reps  - Shoulder Internal Rotation with Resistance  - 1 x daily - 3 x weekly - 3 sets - 10-25 reps  - Shoulder External Rotation with Anchored Resistance  - 1 x daily - 3 x weekly - 3 sets - 10-25 reps  - Standing Shoulder Single Arm PNF D2 Flexion with Anchored Resistance  - 1 x daily - 3 x weekly - 3 sets - 10-25 reps  - Standing Single Arm Shoulder External Rotation in Abduction with Anchored Resistance  - 1 x daily - 3 x weekly - 3 sets - 10-25 reps  - Standing Single Arm Shoulder Internal Rotation in Abduction with Anchored Resistance  - 1 x daily - 3 x weekly - 3 sets - 10-25 reps  - Shoulder Abduction with Dumbbells - Thumbs Up  - 1 x daily - 3 x weekly - 3 sets - 10-25 reps  - Scaption with Dumbbells  - 1 x daily - 3 x weekly - 3 sets - 10-25 reps  - Prone Shoulder Horizontal Abduction with External Rotation  - 1 x daily - 3 x weekly - 3 sets - 10-25 reps  - Prone Single Arm Shoulder Horizontal Abduction with Dumbbell  - 1 x daily - 3 x weekly - 3 sets - 10-25 reps  - Prone Shoulder Row  - 1 x daily - 3 x weekly - 3 sets - 10-25 reps  - Standing Bicep Curls Supinated with Dumbbells  - 1 x daily - 3 x weekly - 3 sets - 10-25 reps  - Single Arm Overhead Triceps Extension  - 1 x daily - 3 x weekly - 3 sets - 10-25 reps  - Seated Wrist Extension with Dumbbell  - 1 x daily - 3 x weekly - 3 sets - 10-25 reps  - Seated Wrist Flexion with Dumbbell  - 1 x daily - 3 x weekly - 3 sets - 10-25 reps  - Seated Wrist Supination Pronation with Can  - 1 x daily - 3 x weekly - 3 sets - 10-25 reps  - Seated Wrist Supination Pronation with Can  - 1 x daily - 3 x weekly - 3 sets - 10-25 reps  - Standing Low Shoulder Row with Anchored Resistance  - 1 x daily - 3 x  weekly - 3 sets - 10-25 reps  - Shoulder extension with resistance - Neutral  - 1 x daily - 3 x weekly - 3 sets - 10-25 reps  - Standing High Row with Resistance  - 1 x daily - 3 x weekly - 3 sets - 10-25 reps  - Standing Shoulder Shrug and Retraction with Resistance  - 1 x daily - 3 x weekly - 3 sets - 10-25 reps  - Standing  Press with Resistance  - 1 x daily - 3 x weekly - 3 sets - 10-25 reps  - Quadruped  with Resistance  - 1 x daily - 3 x weekly - 3 sets - 10-25 reps  - Standing Upper Trapezius Stretch  - 1 x daily - 7 x weekly - 3 sets - 3 reps - 30s hold  - Gentle Levator Scapulae Stretch  - 1 x daily - 7 x weekly - 3 sets - 3 reps - 30s hold  - Doorway Pec Stretch at 90 Degrees Abduction  - 1 x daily - 7 x weekly - 3 sets - 3 reps - 30s hold  - Doorway Pec Stretch at 60 Elevation  - 1 x daily - 7 x weekly - 3 sets - 3 reps - 30s hold  - Doorway Pec Stretch at 120 Degrees Abduction  - 1 x daily - 7 x weekly - 3 sets - 3 reps - 30s hold  - Supine Chest Stretch on Foam Roll  - 1 x daily - 7 x weekly - 1 sets - 1 reps - 5min hold

## 2024-12-09 NOTE — LETTER
December 9, 2024     Kasey Villa MD  [address]  [address]  [place] [address]    Patient: Inga Sams   YOB: 1990   Date of Visit: 12/9/2024       Dear Dr. Kasey Villa MD:    Thank you for referring Inga Sams to me for evaluation. Below are my notes for this consultation.  If you have questions, please do not hesitate to call me. I look forward to following your patient along with you.       Sincerely,     Kirti Friend MD      CC: No Recipients  ______________________________________________________________________________________    Chief Complaint   Patient presents with   • Right Shoulder - Pain     Consulting physician: Kasey Villa MD    A report with my findings and recommendations will be sent to the primary and referring physician via written or electronic means when information is available    History of Present Illness:  Inga Sams is a 34 y.o. 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    She reports 2 months of right anterior shoulder pain without an inciting injury or trauma. She notes that they have heavy doors at home she pulls repetitively. She also has to often hold her youngest child in her left arm and does things with her right arm. The pain initially radiated to her upper arm. The pain has persisted and she notes it when making certain movements with her right arm/shoulder. It does not wake her up at night, but she cannot sleep on her right side. She is not taking any medications for the pain. No numbness or tingling. No prior right shoulder injuries.    Past [facility] HX:  Specialty Problems    None    ROS  12 point ROS reviewed and is negative except for items listed: None    Social Hx:  Home:  Lives with  and 3 children  Sports: Running    Medications:   Current Outpatient Medications on File Prior to Visit   Medication Sig Dispense Refill   • emtricitabine-tenofovir, TDF, (Truvada) 200-300 mg tablet TAKE 1 TABLET DAILY  "90 tablet 3   • PNV no.95/ferrous fum/folic ac (PRENATAL ORAL) Take by mouth.     • sertraline (Zoloft) 50 mg tablet TAKE 1 TABLET DAILY AS DIRECTED 90 tablet 3     No current facility-administered medications on file prior to visit.       Allergies:  No Known Allergies     Physical Exam:    Visit Vitals  Pulse 81   Ht 1.575 m (5' 2\")   Wt 67.1 kg (148 lb)   SpO2 100%   BMI 27.07 kg/m²   OB Status Recent pregnancy   Smoking Status Never   BSA 1.71 m²      Vitals reviewed    General appearance: Well-appearing well-nourished  Psych: Normal mood and affect    Neuro: Normal sensation to light touch throughout the involved extremities  Vascular: No extremity edema or discoloration.  Skin: negative.  Lymphatic: no regional lymphadenopathy present.  Eyes: no conjunctival injection.    BILATERAL SHOULDER EXAM    Inspection:  Posture: Rounded shoulder posture  Erythema: No   Swelling/bruising: No   Muscle atrophy No     Range of motion:   Abduction (180) full, pain free   Extension (40) full, pain free   Adduction (20-40) full, pain free   Forward flexion (160-170) full, pain free   Internal rotation in adduction (80-90) full, pain free   Internal rotation in abduction (50-70) full, pain free L, painful R  External rotation in adduction (90) full, pain free   External rotation in abduction () full, pain free L, painful R    Scapular function: normal     Cervical spine   Flexion (50-70) full, no pain   Extension (60-85)) full, no pain  Lateral bend R (40-50) full, no pain   Lateral bend L (40-50) full, no pain   Lateral rotation R (60-75) full, no pain   Lateral rotation L (60-75) full, no pain     Shoulder Palpation:   TTP SC joint no   TTP clavicle  no   TTP Bicipital groove no   TTP AC joint no   TTP humeral head no   TTP anterior joint line  no   TTP posterior joint line  no   TTP scapula no     TTP deltoids no   TTP trapezius no   TTP rhomboids no   TTP teres minor or infraspinatus no   TTP supraspinatus no   TTP " pectoralis no   TTP biceps  no   TTP triceps  no     TTP midline cervical spine no   TTP paraspinous muscles no    Strength:   Supraspinatus pain free, 5/5  Infraspinatus and teres minor pain free L, painful R, 5/5  Subscapularis pain free L, painful R, 5/5  Deltoid pain free, 5/5  Latissimus pain free, 5/5    Normal sensation:  C8 dermatome/ulnar nerve: small finger   C7 dermatome/meidan nerve: middle finger   C6 dermatome/radial nerve: thumb   C5 dermatome/axillary nerve: deltoid patch     Impingement tests:   Hawkin's: Negative L, + R  Neer's: Negative     AC joint: crossover adduction: Negative     Biceps tendon tests:   Speeds: Negative L, Positive R  Yergason's: Negative L, Positive R    Stability testing:   Apprehension: Negative   Relocation: Negative   Anterior glide: Negative   Posterior glide: Negative   Sulcus: Negative    Labral tests:  Obrein's: negative   Clunk: negative   Shift: negative       Imaging:  Right shoulder x-rays obtained today on 12/9/2024 demonstrate no acute fracture, dislocation, malalignment, or osseous abnormality.    Imaging was personally interpreted and reviewed with the patient and/or family    Impression and Plan:  Inga Sams is a 34 y.o. 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   Exam with rounded shoulder posture, R shoulder pain with internal and external rotation in abduction, pain with R infraspinatus and subscapularis testing, and positive Hawkin's, Speeds and Yergason's on the right.   We provided home exercises for her to work on and provided a referral to physical therapy. Voltaren three times a day for pain/inflammation (breastfeeding) Follow-up as needed. Can perform injection of SA bursa if needed    Prasanna Espinoza MD, MEd  Primary Care Sports Medicine Fellow, PGY-4  [facility]    I saw and evaluated the patient. I personally obtained the key and critical portions of the history and physical exam or was physically present for key and critical portions performed by the resident/fellow. I reviewed the resident/fellow's documentation and discussed the patient with the resident/fellow. I agree with the resident/fellow's medical decision making as documented in the note.    ** Please excuse any errors in grammar or translation related to this dictation. Voice recognition software was utilized to prepare this document. **

## 2025-02-05 ENCOUNTER — HOSPITAL ENCOUNTER (EMERGENCY)
Facility: HOSPITAL | Age: 35
Discharge: HOME | End: 2025-02-05
Payer: COMMERCIAL

## 2025-02-05 ENCOUNTER — APPOINTMENT (OUTPATIENT)
Dept: RADIOLOGY | Facility: HOSPITAL | Age: 35
End: 2025-02-05
Payer: COMMERCIAL

## 2025-02-05 VITALS
TEMPERATURE: 98.3 F | HEART RATE: 89 BPM | OXYGEN SATURATION: 98 % | SYSTOLIC BLOOD PRESSURE: 130 MMHG | DIASTOLIC BLOOD PRESSURE: 84 MMHG | WEIGHT: 150 LBS | RESPIRATION RATE: 20 BRPM | BODY MASS INDEX: 27.44 KG/M2

## 2025-02-05 DIAGNOSIS — J06.9 UPPER RESPIRATORY TRACT INFECTION, UNSPECIFIED TYPE: Primary | ICD-10-CM

## 2025-02-05 PROCEDURE — 71046 X-RAY EXAM CHEST 2 VIEWS: CPT | Mod: FOREIGN READ | Performed by: RADIOLOGY

## 2025-02-05 PROCEDURE — 2500000001 HC RX 250 WO HCPCS SELF ADMINISTERED DRUGS (ALT 637 FOR MEDICARE OP): Performed by: SURGERY

## 2025-02-05 PROCEDURE — 2500000005 HC RX 250 GENERAL PHARMACY W/O HCPCS: Performed by: SURGERY

## 2025-02-05 PROCEDURE — 99283 EMERGENCY DEPT VISIT LOW MDM: CPT | Mod: 25

## 2025-02-05 PROCEDURE — 71046 X-RAY EXAM CHEST 2 VIEWS: CPT

## 2025-02-05 RX ORDER — LIDOCAINE HYDROCHLORIDE 20 MG/ML
15 SOLUTION OROPHARYNGEAL ONCE
Status: COMPLETED | OUTPATIENT
Start: 2025-02-05 | End: 2025-02-05

## 2025-02-05 RX ORDER — LIDOCAINE HYDROCHLORIDE 20 MG/ML
15 SOLUTION OROPHARYNGEAL AS NEEDED
Qty: 100 ML | Refills: 0 | Status: SHIPPED | OUTPATIENT
Start: 2025-02-05 | End: 2025-02-15

## 2025-02-05 RX ORDER — BENZONATATE 100 MG/1
100 CAPSULE ORAL EVERY 8 HOURS
Qty: 21 CAPSULE | Refills: 0 | Status: SHIPPED | OUTPATIENT
Start: 2025-02-05 | End: 2025-02-12

## 2025-02-05 RX ORDER — ACETAMINOPHEN 325 MG/1
650 TABLET ORAL ONCE
Status: COMPLETED | OUTPATIENT
Start: 2025-02-05 | End: 2025-02-05

## 2025-02-05 RX ORDER — BENZONATATE 100 MG/1
100 CAPSULE ORAL 3 TIMES DAILY PRN
Status: DISCONTINUED | OUTPATIENT
Start: 2025-02-05 | End: 2025-02-06 | Stop reason: HOSPADM

## 2025-02-05 RX ADMIN — ACETAMINOPHEN 650 MG: 325 TABLET, FILM COATED ORAL at 22:26

## 2025-02-05 RX ADMIN — LIDOCAINE HYDROCHLORIDE 15 ML: 20 SOLUTION ORAL at 22:27

## 2025-02-05 RX ADMIN — BENZONATATE 100 MG: 100 CAPSULE ORAL at 22:26

## 2025-02-05 ASSESSMENT — COLUMBIA-SUICIDE SEVERITY RATING SCALE - C-SSRS
2. HAVE YOU ACTUALLY HAD ANY THOUGHTS OF KILLING YOURSELF?: NO
1. IN THE PAST MONTH, HAVE YOU WISHED YOU WERE DEAD OR WISHED YOU COULD GO TO SLEEP AND NOT WAKE UP?: NO
6. HAVE YOU EVER DONE ANYTHING, STARTED TO DO ANYTHING, OR PREPARED TO DO ANYTHING TO END YOUR LIFE?: NO

## 2025-02-05 ASSESSMENT — PAIN - FUNCTIONAL ASSESSMENT: PAIN_FUNCTIONAL_ASSESSMENT: 0-10

## 2025-02-05 ASSESSMENT — PAIN SCALES - GENERAL: PAINLEVEL_OUTOF10: 0 - NO PAIN

## 2025-02-06 NOTE — ED PROVIDER NOTES
"Chief Complaint   Patient presents with    Cough     HPI:   Inga Sams is an 34 y.o. breast-feeding female presenting to the ED for flulike symptoms for the last 4 days.  Patient explains on Saturday she took a home COVID test that was positive.  She reports severe congestion, rhinorrhea and postnasal drip.  She \"feels like she is choking on something.  And she has a severely sore throat when she coughs.  She denies any chest pain or shortness of breath.  She explains she is trying to avoid using Sudafed as she is breast-feeding and she is concerned that dries up your supply.  She denies any abdominal pain NVD.  No dizziness but does report a mild headache.      No Known Allergies:  No past medical history on file.  Past Surgical History:   Procedure Laterality Date    ASD REPAIR       No family history on file.     Physical Exam  Vitals and nursing note reviewed.   Constitutional:       General: She is not in acute distress.     Appearance: She is well-developed.   HENT:      Head: Normocephalic and atraumatic.      Right Ear: Tympanic membrane normal.      Nose: Congestion and rhinorrhea present.      Mouth/Throat:      Mouth: Mucous membranes are moist.      Pharynx: Oropharynx is clear.   Eyes:      Extraocular Movements: Extraocular movements intact.      Conjunctiva/sclera: Conjunctivae normal.      Pupils: Pupils are equal, round, and reactive to light.   Cardiovascular:      Rate and Rhythm: Normal rate and regular rhythm.      Heart sounds: No murmur heard.  Pulmonary:      Effort: Pulmonary effort is normal. No respiratory distress.      Breath sounds: Normal breath sounds.   Abdominal:      Palpations: Abdomen is soft.      Tenderness: There is no abdominal tenderness.   Musculoskeletal:         General: No swelling.      Cervical back: Neck supple.   Skin:     General: Skin is warm and dry.      Capillary Refill: Capillary refill takes less than 2 seconds.   Neurological:      Mental Status: She is " alert.   Psychiatric:         Mood and Affect: Mood normal.        VS: As documented in the triage note and EMR flowsheet from this visit were reviewed.    Medical Decision Making: This is a 34-year-old female presenting to the ED for chief complaint of persistent cough after taking a positive home COVID test on Saturday.  She reports that her throat is severely sore from coughing repeatedly.  She denies any chest pain and her vitals are stable she is not tachycardic and her oxygen saturations at 98%.  I have low suspicion for PE.  On exam her lungs were clear to auscultation.  Her posterior oropharynx was clear without erythema or exudate.  No swelling.  No dysphonia.  She is tolerating his secretions.  No stridor.  Chest x-ray was clear there was no evidence of infiltrate or consolidation.  She was treated with Tessalon Perles and lidocaine viscous solution for her sore throat in the ED.  Was offered Sudafed however she refused.  She is appropriate for outpatient management and follow-up with her PCP.  Diagnoses as of 02/05/25 2215   Upper respiratory tract infection, unspecified type     Counseling: Spoke with the patient and discussed today´s findings, in addition to providing specific details for the plan of care and expected course.  Patient was given the opportunity to ask questions.    Discussed return precautions and importance of follow-up.  Advised to follow-up with PCP.  I specifically advised to return to the ED for changing or worsening symptoms, new symptoms, complaint specific precautions, and precautions listed on the discharge paperwork.  Educated on the common potential side effects of medications prescribed.    I advised the patient that the emergency evaluation and treatment provided today doesn't end their need for medical care. It is very important that they follow-up with their primary care provider or other specialist as instructed.    The plan of care was mutually agreed upon with the  patient. The patient and/or family were given the opportunity to ask questions. All questions asked today in the ED were answered to the best of my ability with today's information.    This report was transcribed using voice recognition software.  Every effort was made to ensure accuracy, however, inadvertently computerized transcription errors may be present.       Uday Trevino PA-C  02/05/25 [id number]

## 2025-02-06 NOTE — DISCHARGE INSTRUCTIONS
You have been seen at a [facility] facility.  You had a chest x-ray that was reassuring.  You are given a prescription called Tessalon Perles to suppress her cough.  You may use viscous lidocaine for your sore throat as needed.  Please use Tylenol for any headaches or fever.  Please follow-up with your primary care provider in the next 1 to 2 days for further evaluation and routine follow-up.  Please return to the emergency room if having any worsening symptoms.  Please follow-up with any specialists if discussed during your emergency room stay.

## 2025-03-20 DIAGNOSIS — Z20.6 EXPOSURE TO HIV: ICD-10-CM

## 2025-03-20 RX ORDER — EMTRICITABINE AND TENOFOVIR DISOPROXIL FUMARATE 200; 300 MG/1; MG/1
1 TABLET, FILM COATED ORAL DAILY
Qty: 90 TABLET | Refills: 3 | OUTPATIENT
Start: 2025-03-20

## 2025-03-20 NOTE — TELEPHONE ENCOUNTER
Patient needs annual appointment, please call [phone] to schedule and refill can be sent at that time.

## 2025-03-31 ENCOUNTER — OFFICE VISIT (OUTPATIENT)
Dept: URGENT CARE | Age: 35
End: 2025-03-31
Payer: COMMERCIAL

## 2025-03-31 VITALS
SYSTOLIC BLOOD PRESSURE: 116 MMHG | TEMPERATURE: 98.2 F | OXYGEN SATURATION: 98 % | DIASTOLIC BLOOD PRESSURE: 78 MMHG | HEART RATE: 66 BPM | RESPIRATION RATE: 16 BRPM

## 2025-03-31 DIAGNOSIS — N76.0 ACUTE VAGINITIS: Primary | ICD-10-CM

## 2025-03-31 LAB — PREGNANCY TEST URINE, POC: NEGATIVE

## 2025-03-31 PROCEDURE — 1036F TOBACCO NON-USER: CPT | Performed by: NURSE PRACTITIONER

## 2025-03-31 PROCEDURE — 81025 URINE PREGNANCY TEST: CPT | Performed by: NURSE PRACTITIONER

## 2025-03-31 PROCEDURE — 99203 OFFICE O/P NEW LOW 30 MIN: CPT | Performed by: NURSE PRACTITIONER

## 2025-03-31 RX ORDER — BACITRACIN ZINC 500 UNIT/G
OINTMENT (GRAM) TOPICAL 2 TIMES DAILY
Qty: 28 G | Refills: 0 | Status: SHIPPED | OUTPATIENT
Start: 2025-03-31

## 2025-03-31 RX ORDER — FLUCONAZOLE 150 MG/1
150 TABLET ORAL ONCE
Qty: 2 TABLET | Refills: 0 | Status: SHIPPED | OUTPATIENT
Start: 2025-03-31 | End: 2025-03-31

## 2025-03-31 ASSESSMENT — ENCOUNTER SYMPTOMS
DIZZINESS: 0
COUGH: 0
CHILLS: 0
DIFFICULTY URINATING: 0
HEADACHES: 0
HEMATURIA: 0
COLOR CHANGE: 1
FREQUENCY: 0
VOMITING: 0
DIARRHEA: 0
NAUSEA: 0
SHORTNESS OF BREATH: 0
FLANK PAIN: 0
FEVER: 0
ABDOMINAL PAIN: 0
DYSURIA: 0

## 2025-03-31 NOTE — PROGRESS NOTES
"Yeah Subjective   Patient ID: Inga Sams is a 34 y.o. female. They present today with a chief complaint of Vaginal Itching (White discharge And outside of the vaginal area red and inflamed x 5 days).    History of Present Illness  This is a 34-year-old female who is 13 months postpartum and breast-feeding who presents to the clinic today for evaluation of possible yeast infection.  Patient states 5 days ago she developed vaginal itching and a small amount of discharge.  She tried the 3-day Monistat cream with some relief.  Was going to try the 7-day relief but feels like her symptoms are not improving.  States that her external vaginal area was swollen but ibuprofen took it the swelling down.  States like she feels like the skin is \"fragile \"no urinary symptoms.  Denies hematuria, flank pain, nausea, vomiting, abdominal pain, additional vaginal complaints.  Denies fever, chills.          Past Medical History  Allergies as of 03/31/2025    (No Known Allergies)       (Not in a hospital admission)       History reviewed. No pertinent past medical history.    Past Surgical History:   Procedure Laterality Date    ASD REPAIR          reports that she has never smoked. She has never used smokeless tobacco.    Review of Systems  Review of Systems   Constitutional:  Negative for chills and fever.   Respiratory:  Negative for cough and shortness of breath.    Cardiovascular:  Negative for chest pain.   Gastrointestinal:  Negative for abdominal pain, diarrhea, nausea and vomiting.   Genitourinary:  Positive for vaginal discharge. Negative for difficulty urinating, dysuria, flank pain, frequency, genital sores, hematuria, menstrual problem, pelvic pain, urgency, vaginal bleeding and vaginal pain.   Skin:  Positive for color change. Negative for rash.   Neurological:  Negative for dizziness and headaches.   All other systems reviewed and are negative.                                 Objective    Vitals:    03/31/25 1107   BP: " 116/78   BP Location: Right arm   Patient Position: Sitting   Pulse: 66   Resp: 16   Temp: 36.8 °C (98.2 °F)   SpO2: 98%     Patient's last menstrual period was 02/28/2025.    Physical Exam  Vitals and nursing note reviewed.   Constitutional:       Appearance: Normal appearance.   HENT:      Head: Normocephalic and atraumatic.   Eyes:      Conjunctiva/sclera: Conjunctivae normal.   Cardiovascular:      Rate and Rhythm: Normal rate and regular rhythm.      Pulses: Normal pulses.      Heart sounds: Normal heart sounds.   Pulmonary:      Effort: Pulmonary effort is normal.      Breath sounds: Normal breath sounds.   Abdominal:      General: Bowel sounds are normal.      Palpations: Abdomen is soft.      Tenderness: There is no abdominal tenderness. There is no right CVA tenderness or left CVA tenderness.   Musculoskeletal:         General: Normal range of motion.      Cervical back: Neck supple.   Skin:     General: Skin is warm and dry.   Neurological:      Mental Status: She is alert.         Procedures    Point of Care Test & Imaging Results from this visit  Results for orders placed or performed in visit on 03/31/25   POCT pregnancy, urine manually resulted   Result Value Ref Range    Preg Test, Ur Negative Negative      Imaging  No results found.    Cardiology, Vascular, and Other Imaging  No other imaging results found for the past 2 days      Diagnostic study results (if any) were reviewed by Kristin L Schoenlein, APRN-CNP.    Assessment/Plan   Allergies, medications, history, and pertinent labs/EKGs/Imaging reviewed by Kristin L Schoenlein, APRN-CNP.     Medical Decision Making  VSS, NAD, Nontoxic appearing.  hCG negative.  Patient deferred STI testing at this time.  Patient is not having urinary symptoms therefore UA deferred.  Vaginal exam deferred at this time as patient performed self swab.  Additional physical exam as document above.    Symptoms, history, and exam are consistent with: Vaginitis likely  secondary to yeast.  Placed patient on fluconazole oral and bacitracin to apply on external vaginal area for likely excoriation.  Patient performed a BV/yeast swab and will treat in addition pending results.    Differential Dx include, however, are not limited to: Atrophic vaginitis, bacterial vaginitis, Candida vaginitis, UTI, acute abdomen    I have a low suspicion for any acute pathologies requiring emergent evaluation and further workup at this time.  I believe patient is safe to discharge home with a low threshold for emergency room as discussed during visit.  We discussed close follow-up with primary care provider/pediatrician. Supportive care discussed.  Medication(s) profile of OTC and Rxed medication(s) if prescribed was (were) reviewed.  All questions answered and addressed.  Patient verbalized understanding.      Orders and Diagnoses  Diagnoses and all orders for this visit:  Acute vaginitis  -     fluconazole (Diflucan) 150 mg tablet; Take 1 tablet (150 mg) by mouth 1 time for 1 dose. May repeat in 72 hours if needed  -     POCT pregnancy, urine manually resulted  -     bacitracin 500 unit/gram ointment; Apply topically 2 times a day.  -     Vaginitis Gram Stain For Bacterial Vaginosis + Yeast      Medical Admin Record      Patient disposition: Home    Electronically signed by Kristin L Schoenlein, APRN-CNP  1:32 PM

## 2025-03-31 NOTE — PATIENT INSTRUCTIONS
Thank you for letting me care for you today.  You have been seen today for likely a vaginal yeast infection.  Take medications as reviewed.  Please follow up with your primary care provider/pediatrician as directed.   If one is needed, please call [phone].  Please seek care in emergency room for red flags as discussed during visit.

## 2025-04-01 ENCOUNTER — TELEPHONE (OUTPATIENT)
Dept: OBSTETRICS AND GYNECOLOGY | Facility: CLINIC | Age: 35
End: 2025-04-01
Payer: COMMERCIAL

## 2025-04-01 DIAGNOSIS — N89.8 VAGINAL ITCHING: Primary | ICD-10-CM

## 2025-04-01 DIAGNOSIS — N89.8 VAGINAL ITCHING: ICD-10-CM

## 2025-04-01 LAB — BV SCORE VAG QL: NORMAL

## 2025-04-01 RX ORDER — CLOTRIMAZOLE AND BETAMETHASONE DIPROPIONATE 10; .64 MG/G; MG/G
1 CREAM TOPICAL 2 TIMES DAILY
Qty: 15 G | Refills: 0 | Status: SHIPPED | OUTPATIENT
Start: 2025-04-01 | End: 2025-04-01 | Stop reason: SDUPTHER

## 2025-04-01 NOTE — TELEPHONE ENCOUNTER
Contacted pt  Name and  verified  Spoke with pt aware of rx to pharmacy and to keep her appt  Pt verbalized understanding.  No further questions or concerns at this time.

## 2025-04-02 ENCOUNTER — TELEPHONE (OUTPATIENT)
Dept: OBSTETRICS AND GYNECOLOGY | Facility: CLINIC | Age: 35
End: 2025-04-02

## 2025-04-02 ENCOUNTER — APPOINTMENT (OUTPATIENT)
Dept: PRIMARY CARE | Facility: CLINIC | Age: 35
End: 2025-04-02
Payer: COMMERCIAL

## 2025-04-02 RX ORDER — CLOTRIMAZOLE AND BETAMETHASONE DIPROPIONATE 10; .64 MG/G; MG/G
1 CREAM TOPICAL 2 TIMES DAILY
Qty: 15 G | Refills: 0 | Status: SHIPPED | OUTPATIENT
Start: 2025-04-02 | End: 2025-04-07 | Stop reason: ALTCHOICE

## 2025-04-07 ENCOUNTER — OFFICE VISIT (OUTPATIENT)
Dept: OBSTETRICS AND GYNECOLOGY | Facility: CLINIC | Age: 35
End: 2025-04-07
Payer: COMMERCIAL

## 2025-04-07 VITALS
BODY MASS INDEX: 27.98 KG/M2 | HEART RATE: 72 BPM | WEIGHT: 153 LBS | SYSTOLIC BLOOD PRESSURE: 120 MMHG | DIASTOLIC BLOOD PRESSURE: 78 MMHG

## 2025-04-07 DIAGNOSIS — Z23 NEED FOR HPV VACCINE: ICD-10-CM

## 2025-04-07 DIAGNOSIS — Z20.6 EXPOSURE TO HIV: ICD-10-CM

## 2025-04-07 DIAGNOSIS — B37.31 CANDIDIASIS OF VULVA: ICD-10-CM

## 2025-04-07 DIAGNOSIS — N76.0 ACUTE VAGINITIS: Primary | ICD-10-CM

## 2025-04-07 DIAGNOSIS — Z12.4 CERVICAL CANCER SCREENING: ICD-10-CM

## 2025-04-07 PROCEDURE — 90471 IMMUNIZATION ADMIN: CPT | Performed by: NURSE PRACTITIONER

## 2025-04-07 PROCEDURE — 99213 OFFICE O/P EST LOW 20 MIN: CPT | Mod: 25 | Performed by: NURSE PRACTITIONER

## 2025-04-07 PROCEDURE — 1036F TOBACCO NON-USER: CPT | Performed by: NURSE PRACTITIONER

## 2025-04-07 PROCEDURE — 99213 OFFICE O/P EST LOW 20 MIN: CPT | Performed by: NURSE PRACTITIONER

## 2025-04-07 RX ORDER — EMTRICITABINE AND TENOFOVIR DISOPROXIL FUMARATE 200; 300 MG/1; MG/1
1 TABLET, FILM COATED ORAL DAILY
Qty: 90 TABLET | Refills: 0 | Status: SHIPPED | OUTPATIENT
Start: 2025-04-07

## 2025-04-07 RX ORDER — METRONIDAZOLE 500 MG/1
500 TABLET ORAL 2 TIMES DAILY
Qty: 14 TABLET | Refills: 0 | Status: SHIPPED | OUTPATIENT
Start: 2025-04-07 | End: 2025-04-14

## 2025-04-07 RX ORDER — CLOTRIMAZOLE AND BETAMETHASONE DIPROPIONATE 10; .64 MG/G; MG/G
1 CREAM TOPICAL 2 TIMES DAILY
Qty: 15 G | Refills: 1 | Status: SHIPPED | OUTPATIENT
Start: 2025-04-07 | End: 2025-05-05

## 2025-04-07 ASSESSMENT — PAIN SCALES - GENERAL: PAINLEVEL_OUTOF10: 0-NO PAIN

## 2025-04-07 NOTE — PROGRESS NOTES
Bell Xiong is a 34 y.o. female who presents for Gynecologic Exam (Pt in office for yeast infection/LMP; 2025/Last pap 2023 ASC-US /Chaperone declined), Vaginal Discharge, and Vaginal Itching.    HPI    Patient presents with c/o vaginal irritation and discharge x8 days. She reports initially attempting treatment with 3-day monistat without relief of symptoms. She then presented to urgent care, was treated with diflucan and given Rx for bacitracin for external erythema. Vaginitis cx at that time was negative for yeast and BV, but with intermediate andreia score. She reports symptoms did not improve following diflucan.    Last pap 2023 ASCUS, HPV (-), agreeable for repeat today. Patient also reports previously receiving 2/3 HPV vaccine series, desires to complete series today. Also reports partner with h/o HIV and HPV, requesting x1 refill of PrEP while working to get into originally prescribing provider's office for visit and blood work.      OB History          4    Para   3    Term   3            AB   1    Living   3         SAB   1    IAB        Ectopic        Multiple   0    Live Births   3                Objective   /78   Pulse 72   Wt 69.4 kg (153 lb)   LMP 2025 (Approximate)   BMI 27.98 kg/m²   Physical Exam  Constitutional:       Appearance: Normal appearance.   Genitourinary:      Right Labia: rash (erythema on labia) and skin changes.      Left Labia: skin changes and rash (erythema on labia).        Vaginal erythema present.      No vaginal tenderness or bleeding.      Cervical discharge and friability present.      No cervical motion tenderness, lesion or polyp.   Pulmonary:      Effort: Pulmonary effort is normal.   Neurological:      General: No focal deficit present.      Mental Status: She is alert and oriented to person, place, and time. Mental status is at baseline.   Skin:     General: Skin is warm and dry.   Psychiatric:         Mood and Affect: Mood  normal.         Behavior: Behavior normal.         Thought Content: Thought content normal.         Judgment: Judgment normal.       Assessment/Plan   Diagnoses and all orders for this visit:  Acute vaginitis  -     Vaginitis Gram Stain For Bacterial Vaginosis + Yeast  -     metroNIDAZOLE (Flagyl) 500 mg tablet; Take 1 tablet (500 mg) by mouth 2 times a day for 7 days.  Candidiasis of vulva  -     clotrimazole-betamethasone (Lotrisone) cream; Apply 1 Application topically 2 times a day for 28 days.  Cervical cancer screening  -     THINPREP PAP TEST (>30)  Exposure to HIV  -     emtricitabine-tenofovir, TDF, (Truvada) 200-300 mg tablet; Take 1 tablet by mouth once daily.  Need for HPV vaccine  -     HPV 9-valent vaccine (GARDASIL 9)    Follow up if symptoms worsen or fail to improve.  Kacey Humphrey, APRN-CNM, APRN-CNP

## 2025-04-07 NOTE — PROGRESS NOTES
Hpv vaccine per Kacey Humphrey CNM  Given IM into right deltoid  Supplied by office  Patient tolerated well  VIS given     LOT #: [id number]  Expiration date: 12/15/2026

## 2025-04-08 LAB — BV SCORE VAG QL: NORMAL

## 2025-04-12 DIAGNOSIS — N76.0 ACUTE VAGINITIS: ICD-10-CM

## 2025-04-12 RX ORDER — FLUCONAZOLE 150 MG/1
150 TABLET ORAL ONCE
Qty: 2 TABLET | Refills: 0 | Status: SHIPPED | OUTPATIENT
Start: 2025-04-12 | End: 2025-04-12

## 2025-04-14 ENCOUNTER — TELEPHONE (OUTPATIENT)
Dept: OBSTETRICS AND GYNECOLOGY | Facility: CLINIC | Age: 35
End: 2025-04-14
Payer: COMMERCIAL

## 2025-04-19 ENCOUNTER — HOSPITAL ENCOUNTER (EMERGENCY)
Facility: HOSPITAL | Age: 35
Discharge: HOME | End: 2025-04-19
Attending: EMERGENCY MEDICINE
Payer: COMMERCIAL

## 2025-04-19 ENCOUNTER — APPOINTMENT (OUTPATIENT)
Dept: RADIOLOGY | Facility: HOSPITAL | Age: 35
End: 2025-04-19
Payer: COMMERCIAL

## 2025-04-19 VITALS
WEIGHT: 150 LBS | HEIGHT: 63 IN | TEMPERATURE: 101.6 F | OXYGEN SATURATION: 100 % | BODY MASS INDEX: 26.58 KG/M2 | DIASTOLIC BLOOD PRESSURE: 75 MMHG | HEART RATE: 103 BPM | RESPIRATION RATE: 18 BRPM | SYSTOLIC BLOOD PRESSURE: 114 MMHG

## 2025-04-19 DIAGNOSIS — N39.0 BACTERIAL UTI: Primary | ICD-10-CM

## 2025-04-19 DIAGNOSIS — A49.9 BACTERIAL UTI: Primary | ICD-10-CM

## 2025-04-19 DIAGNOSIS — R59.0 INGUINAL LYMPHADENOPATHY: ICD-10-CM

## 2025-04-19 LAB
ALBUMIN SERPL BCP-MCNC: 4.5 G/DL (ref 3.4–5)
ALP SERPL-CCNC: 86 U/L (ref 33–110)
ALT SERPL W P-5'-P-CCNC: 14 U/L (ref 7–45)
ANION GAP SERPL CALC-SCNC: 12 MMOL/L (ref 10–20)
APPEARANCE UR: ABNORMAL
AST SERPL W P-5'-P-CCNC: 20 U/L (ref 9–39)
BASOPHILS # BLD AUTO: 0.04 X10*3/UL (ref 0–0.1)
BASOPHILS NFR BLD AUTO: 0.2 %
BILIRUB SERPL-MCNC: 1 MG/DL (ref 0–1.2)
BILIRUB UR STRIP.AUTO-MCNC: NEGATIVE MG/DL
BUN SERPL-MCNC: 12 MG/DL (ref 6–23)
CALCIUM SERPL-MCNC: 9.2 MG/DL (ref 8.6–10.3)
CHLORIDE SERPL-SCNC: 101 MMOL/L (ref 98–107)
CLUE CELLS SPEC QL WET PREP: NORMAL
CO2 SERPL-SCNC: 24 MMOL/L (ref 21–32)
COLOR UR: YELLOW
CREAT SERPL-MCNC: 0.73 MG/DL (ref 0.5–1.05)
EGFRCR SERPLBLD CKD-EPI 2021: >90 ML/MIN/1.73M*2
EOSINOPHIL # BLD AUTO: 0.01 X10*3/UL (ref 0–0.7)
EOSINOPHIL NFR BLD AUTO: 0.1 %
ERYTHROCYTE [DISTWIDTH] IN BLOOD BY AUTOMATED COUNT: 11.9 % (ref 11.5–14.5)
GLUCOSE SERPL-MCNC: 115 MG/DL (ref 74–99)
GLUCOSE UR STRIP.AUTO-MCNC: NORMAL MG/DL
HCG UR QL IA.RAPID: NEGATIVE
HCT VFR BLD AUTO: 43.3 % (ref 36–46)
HGB BLD-MCNC: 14.2 G/DL (ref 12–16)
HYALINE CASTS #/AREA URNS AUTO: ABNORMAL /LPF
IMM GRANULOCYTES # BLD AUTO: 0.15 X10*3/UL (ref 0–0.7)
IMM GRANULOCYTES NFR BLD AUTO: 0.8 % (ref 0–0.9)
KETONES UR STRIP.AUTO-MCNC: NEGATIVE MG/DL
LEUKOCYTE ESTERASE UR QL STRIP.AUTO: ABNORMAL
LIPASE SERPL-CCNC: 11 U/L (ref 9–82)
LYMPHOCYTES # BLD AUTO: 0.55 X10*3/UL (ref 1.2–4.8)
LYMPHOCYTES NFR BLD AUTO: 2.8 %
MCH RBC QN AUTO: 30.2 PG (ref 26–34)
MCHC RBC AUTO-ENTMCNC: 32.8 G/DL (ref 32–36)
MCV RBC AUTO: 92 FL (ref 80–100)
MONOCYTES # BLD AUTO: 1.13 X10*3/UL (ref 0.1–1)
MONOCYTES NFR BLD AUTO: 5.7 %
MUCOUS THREADS #/AREA URNS AUTO: ABNORMAL /LPF
NEUTROPHILS # BLD AUTO: 18.12 X10*3/UL (ref 1.2–7.7)
NEUTROPHILS NFR BLD AUTO: 90.4 %
NITRITE UR QL STRIP.AUTO: NEGATIVE
NRBC BLD-RTO: 0 /100 WBCS (ref 0–0)
PH UR STRIP.AUTO: 8 [PH]
PLATELET # BLD AUTO: 247 X10*3/UL (ref 150–450)
POTASSIUM SERPL-SCNC: 4 MMOL/L (ref 3.5–5.3)
PROT SERPL-MCNC: 8.3 G/DL (ref 6.4–8.2)
PROT UR STRIP.AUTO-MCNC: ABNORMAL MG/DL
RBC # BLD AUTO: 4.7 X10*6/UL (ref 4–5.2)
RBC # UR STRIP.AUTO: NEGATIVE MG/DL
RBC #/AREA URNS AUTO: ABNORMAL /HPF
SODIUM SERPL-SCNC: 133 MMOL/L (ref 136–145)
SP GR UR STRIP.AUTO: 1.03
SQUAMOUS #/AREA URNS AUTO: ABNORMAL /HPF
T VAGINALIS SPEC QL WET PREP: NORMAL
UROBILINOGEN UR STRIP.AUTO-MCNC: NORMAL MG/DL
WBC # BLD AUTO: 20 X10*3/UL (ref 4.4–11.3)
WBC #/AREA URNS AUTO: >50 /HPF
WBC VAG QL WET PREP: NORMAL
YEAST VAG QL WET PREP: NORMAL

## 2025-04-19 PROCEDURE — 96375 TX/PRO/DX INJ NEW DRUG ADDON: CPT

## 2025-04-19 PROCEDURE — 87086 URINE CULTURE/COLONY COUNT: CPT | Mod: AHULAB | Performed by: EMERGENCY MEDICINE

## 2025-04-19 PROCEDURE — 2500000001 HC RX 250 WO HCPCS SELF ADMINISTERED DRUGS (ALT 637 FOR MEDICARE OP): Performed by: EMERGENCY MEDICINE

## 2025-04-19 PROCEDURE — 74177 CT ABD & PELVIS W/CONTRAST: CPT

## 2025-04-19 PROCEDURE — 81025 URINE PREGNANCY TEST: CPT | Performed by: EMERGENCY MEDICINE

## 2025-04-19 PROCEDURE — 99285 EMERGENCY DEPT VISIT HI MDM: CPT | Mod: 25 | Performed by: EMERGENCY MEDICINE

## 2025-04-19 PROCEDURE — 85025 COMPLETE CBC W/AUTO DIFF WBC: CPT | Performed by: EMERGENCY MEDICINE

## 2025-04-19 PROCEDURE — 74177 CT ABD & PELVIS W/CONTRAST: CPT | Performed by: STUDENT IN AN ORGANIZED HEALTH CARE EDUCATION/TRAINING PROGRAM

## 2025-04-19 PROCEDURE — 87210 SMEAR WET MOUNT SALINE/INK: CPT | Performed by: EMERGENCY MEDICINE

## 2025-04-19 PROCEDURE — 96365 THER/PROPH/DIAG IV INF INIT: CPT

## 2025-04-19 PROCEDURE — 83690 ASSAY OF LIPASE: CPT | Performed by: EMERGENCY MEDICINE

## 2025-04-19 PROCEDURE — 2500000004 HC RX 250 GENERAL PHARMACY W/ HCPCS (ALT 636 FOR OP/ED): Mod: JZ | Performed by: EMERGENCY MEDICINE

## 2025-04-19 PROCEDURE — 80053 COMPREHEN METABOLIC PANEL: CPT | Performed by: EMERGENCY MEDICINE

## 2025-04-19 PROCEDURE — 2550000001 HC RX 255 CONTRASTS: Performed by: EMERGENCY MEDICINE

## 2025-04-19 PROCEDURE — 36415 COLL VENOUS BLD VENIPUNCTURE: CPT | Performed by: EMERGENCY MEDICINE

## 2025-04-19 PROCEDURE — 81001 URINALYSIS AUTO W/SCOPE: CPT | Performed by: EMERGENCY MEDICINE

## 2025-04-19 PROCEDURE — 87491 CHLMYD TRACH DNA AMP PROBE: CPT | Mod: AHULAB | Performed by: EMERGENCY MEDICINE

## 2025-04-19 RX ORDER — ACETAMINOPHEN 325 MG/1
975 TABLET ORAL ONCE
Status: COMPLETED | OUTPATIENT
Start: 2025-04-19 | End: 2025-04-19

## 2025-04-19 RX ORDER — KETOROLAC TROMETHAMINE 30 MG/ML
15 INJECTION, SOLUTION INTRAMUSCULAR; INTRAVENOUS ONCE
Status: COMPLETED | OUTPATIENT
Start: 2025-04-19 | End: 2025-04-19

## 2025-04-19 RX ORDER — ONDANSETRON HYDROCHLORIDE 2 MG/ML
4 INJECTION, SOLUTION INTRAVENOUS ONCE
Status: COMPLETED | OUTPATIENT
Start: 2025-04-19 | End: 2025-04-19

## 2025-04-19 RX ORDER — CEPHALEXIN 500 MG/1
500 CAPSULE ORAL 4 TIMES DAILY
Qty: 40 CAPSULE | Refills: 0 | Status: SHIPPED | OUTPATIENT
Start: 2025-04-19 | End: 2025-04-23 | Stop reason: ALTCHOICE

## 2025-04-19 RX ORDER — CEFTRIAXONE 2 G/50ML
2 INJECTION, SOLUTION INTRAVENOUS ONCE
Status: COMPLETED | OUTPATIENT
Start: 2025-04-19 | End: 2025-04-19

## 2025-04-19 RX ADMIN — ACETAMINOPHEN 975 MG: 325 TABLET ORAL at 11:40

## 2025-04-19 RX ADMIN — KETOROLAC TROMETHAMINE 15 MG: 30 INJECTION, SOLUTION INTRAMUSCULAR at 09:02

## 2025-04-19 RX ADMIN — IOHEXOL 75 ML: 350 INJECTION, SOLUTION INTRAVENOUS at 10:05

## 2025-04-19 RX ADMIN — CEFTRIAXONE SODIUM 2 G: 2 INJECTION, SOLUTION INTRAVENOUS at 09:54

## 2025-04-19 RX ADMIN — ONDANSETRON 4 MG: 2 INJECTION, SOLUTION INTRAMUSCULAR; INTRAVENOUS at 09:02

## 2025-04-19 ASSESSMENT — PAIN DESCRIPTION - LOCATION
LOCATION: GROIN
LOCATION: GROIN

## 2025-04-19 ASSESSMENT — PAIN - FUNCTIONAL ASSESSMENT
PAIN_FUNCTIONAL_ASSESSMENT: 0-10
PAIN_FUNCTIONAL_ASSESSMENT: 0-10

## 2025-04-19 ASSESSMENT — PAIN SCALES - GENERAL
PAINLEVEL_OUTOF10: 2
PAINLEVEL_OUTOF10: 1
PAINLEVEL_OUTOF10: 5 - MODERATE PAIN
PAINLEVEL_OUTOF10: 2
PAINLEVEL_OUTOF10: 1
PAINLEVEL_OUTOF10: 7

## 2025-04-19 ASSESSMENT — PAIN DESCRIPTION - PAIN TYPE: TYPE: ACUTE PAIN

## 2025-04-19 ASSESSMENT — PAIN DESCRIPTION - PROGRESSION: CLINICAL_PROGRESSION: GRADUALLY WORSENING

## 2025-04-19 ASSESSMENT — PAIN DESCRIPTION - ONSET: ONSET: GRADUAL

## 2025-04-19 ASSESSMENT — PAIN DESCRIPTION - DESCRIPTORS: DESCRIPTORS: BURNING

## 2025-04-19 ASSESSMENT — PAIN DESCRIPTION - ORIENTATION
ORIENTATION: RIGHT
ORIENTATION: RIGHT

## 2025-04-19 ASSESSMENT — COLUMBIA-SUICIDE SEVERITY RATING SCALE - C-SSRS
6. HAVE YOU EVER DONE ANYTHING, STARTED TO DO ANYTHING, OR PREPARED TO DO ANYTHING TO END YOUR LIFE?: NO
1. IN THE PAST MONTH, HAVE YOU WISHED YOU WERE DEAD OR WISHED YOU COULD GO TO SLEEP AND NOT WAKE UP?: NO
2. HAVE YOU ACTUALLY HAD ANY THOUGHTS OF KILLING YOURSELF?: NO

## 2025-04-19 ASSESSMENT — ACTIVITIES OF DAILY LIVING (ADL): EFFECT OF PAIN ON DAILY ACTIVITIES: DECREASED

## 2025-04-19 ASSESSMENT — PAIN DESCRIPTION - FREQUENCY: FREQUENCY: CONSTANT/CONTINUOUS

## 2025-04-19 NOTE — ED TRIAGE NOTES
Pt presents to the ED from home with c/o R sided groin pain. Pt states the pain started Tuesday and tried to take ibuprofen with no relief. Pt states she woke up last night hot, thought she was going to pass out and had really pain. Pt self treated recently for a yeast infection as well. Has lesions where yeast infection was. Pt states the lesions are not where the pain is,.

## 2025-04-19 NOTE — ED NOTES
Pt discharged home with family via POV. No significant detrimental changes prior to discharge. Neuro/skin/respiratory grossly WDL. Belongings with pt/family.     Discharge VS reviewed w/ Dr. Goddard, who is okay w/ pt going home. Strict instructions to return to ER for worsening S/Sx. Pt/pt family verbalized understanding.     Julio Mills RN  04/19/25 3056

## 2025-04-19 NOTE — ED PROVIDER NOTES
HPI   Chief Complaint   Patient presents with    Groin Pain       Chief complaint: Right groin pain    History of present illness: Patient is a 34-year-old female presenting to the emergency department with complaints of right-sided groin pain.  According to the patient, she has been having this pain over the past 5 to 6 days.  The patient states that it is made worse with movement.  Patient denies any fever with this.  The patient denies any dysuria or hematuria.  The patient states that she recently believed that she had a yeast infection and self treated with Monistat.  The patient denies any changes in her bowel habits.  She denies any recent sick contacts.  She denies any katie fever.  Concern for this, the patient presents to the emergency department for further evaluation.  She admits that she is still having some vaginal discharge.      History provided by:  Patient   used: No            Patient History   Medical History[1]  Surgical History[2]  Family History[3]  Social History[4]    Physical Exam   ED Triage Vitals [04/19/25 0810]   Temperature Heart Rate Respirations BP   36.6 °C (97.8 °F) (!) 109 18 106/62      Pulse Ox Temp Source Heart Rate Source Patient Position   100 % Temporal -- --      BP Location FiO2 (%)     -- --       Physical Exam  Constitutional:       Appearance: Normal appearance.   HENT:      Head: Normocephalic and atraumatic.      Right Ear: External ear normal.      Left Ear: External ear normal.      Nose: Nose normal.      Mouth/Throat:      Mouth: Mucous membranes are moist.   Eyes:      General: Lids are normal.      Extraocular Movements: Extraocular movements intact.      Pupils: Pupils are equal, round, and reactive to light.   Cardiovascular:      Rate and Rhythm: Normal rate and regular rhythm.      Heart sounds: Normal heart sounds.   Pulmonary:      Effort: Pulmonary effort is normal.      Breath sounds: Normal breath sounds.   Abdominal:      General:  Abdomen is flat.      Palpations: Abdomen is soft.      Tenderness: There is no abdominal tenderness. There is no guarding or rebound.   Musculoskeletal:         General: No deformity. Normal range of motion.      Cervical back: Normal range of motion and neck supple.   Lymphadenopathy:      Lower Body: Right inguinal adenopathy present.   Skin:     General: Skin is warm.      Capillary Refill: Capillary refill takes less than 2 seconds.      Coloration: Skin is not jaundiced.   Neurological:      General: No focal deficit present.      Mental Status: She is alert and oriented to person, place, and time.   Psychiatric:         Mood and Affect: Mood normal.         Behavior: Behavior normal.           ED Course & MDM   Diagnoses as of 04/19/25 1437   Bacterial UTI   Inguinal lymphadenopathy                 No data recorded     Sonja Coma Scale Score: 15 (04/19/25 0810 : Vicki Proctor RN)                           Medical Decision Making  Medical decision making: Patient remained stable during her time in the emergency department.  The patient's CBC demonstrated a white cell count of 20 but no other significant abnormalities the patient's Chem-7 and LFTs were all within normal limits.  Urinalysis demonstrated 500 leuks loaded white blood white blood cells beta-hCG was negative.  Wet prep was negative lipase was within normal limits.  CAT scan of the patient's abdomen and pelvis demonstrated a 25 x 17 x 22 right inguinal lymph lymphadenopathy with soft tissues stranding but no other significant acute abnormality.    Patient presents to the emergency department with complaints of right sided groin pain.  Workup was performed as above this demonstrated both a urinary tract infection as well as a leukocytosis.  The patient was reassured she was given Zofran Toradol and Rocephin during her time to the emergency department.  Patient was given acetaminophen for a fever of the patient develop during her time in the  emergency department.    Although the patient has a significant infection at this time, the patient appears well and is young and healthy as result I feel the patient can be attempted to treated with this with an outpatient therapy.  The patient was given a dose of Rocephin here patient was given a extended course of Keflex for home use.  It was emphasized to the patient that if she were to have no improvement of her symptoms or have any worsening of her symptoms she should return immediately to the emergency department.  Patient expressed understanding and agreement with this.  The patient was then discharged home in otherwise stable condition.    Amount and/or Complexity of Data Reviewed  Labs: ordered.  Radiology: ordered. Decision-making details documented in ED Course.  ECG/medicine tests: ordered and independent interpretation performed. Decision-making details documented in ED Course.        Procedure  Procedures         [1] No past medical history on file.  [2]   Past Surgical History:  Procedure Laterality Date    ASD REPAIR     [3] No family history on file.  [4]   Social History  Tobacco Use    Smoking status: Never    Smokeless tobacco: Never   Vaping Use    Vaping status: Never Used   Substance Use Topics    Alcohol use: Yes     Comment: occasional    Drug use: Never        Arik Goddard MD  04/19/25 1778

## 2025-04-20 ENCOUNTER — HOSPITAL ENCOUNTER (OUTPATIENT)
Facility: HOSPITAL | Age: 35
Setting detail: OBSERVATION
Discharge: HOME | End: 2025-04-21
Attending: EMERGENCY MEDICINE | Admitting: STUDENT IN AN ORGANIZED HEALTH CARE EDUCATION/TRAINING PROGRAM
Payer: COMMERCIAL

## 2025-04-20 ENCOUNTER — CLINICAL SUPPORT (OUTPATIENT)
Dept: EMERGENCY MEDICINE | Facility: HOSPITAL | Age: 35
End: 2025-04-20
Payer: COMMERCIAL

## 2025-04-20 DIAGNOSIS — R19.09 INGUINAL SWELLING: ICD-10-CM

## 2025-04-20 DIAGNOSIS — L03.314 CELLULITIS OF GROIN: Primary | ICD-10-CM

## 2025-04-20 PROBLEM — L03.90 CELLULITIS: Status: ACTIVE | Noted: 2025-04-20

## 2025-04-20 LAB
ALBUMIN SERPL BCP-MCNC: 4 G/DL (ref 3.4–5)
ALP SERPL-CCNC: 105 U/L (ref 33–110)
ALT SERPL W P-5'-P-CCNC: 13 U/L (ref 7–45)
ANION GAP SERPL CALC-SCNC: 11 MMOL/L (ref 10–20)
AST SERPL W P-5'-P-CCNC: 16 U/L (ref 9–39)
BASOPHILS # BLD AUTO: 0.04 X10*3/UL (ref 0–0.1)
BASOPHILS NFR BLD AUTO: 0.2 %
BILIRUB SERPL-MCNC: 0.3 MG/DL (ref 0–1.2)
BUN SERPL-MCNC: 10 MG/DL (ref 6–23)
C TRACH RRNA SPEC QL NAA+PROBE: NEGATIVE
CALCIUM SERPL-MCNC: 9.5 MG/DL (ref 8.6–10.6)
CHLORIDE SERPL-SCNC: 102 MMOL/L (ref 98–107)
CO2 SERPL-SCNC: 29 MMOL/L (ref 21–32)
CREAT SERPL-MCNC: 0.66 MG/DL (ref 0.5–1.05)
EGFRCR SERPLBLD CKD-EPI 2021: >90 ML/MIN/1.73M*2
EOSINOPHIL # BLD AUTO: 0.2 X10*3/UL (ref 0–0.7)
EOSINOPHIL NFR BLD AUTO: 1.2 %
ERYTHROCYTE [DISTWIDTH] IN BLOOD BY AUTOMATED COUNT: 11.8 % (ref 11.5–14.5)
GLUCOSE SERPL-MCNC: 89 MG/DL (ref 74–99)
HCT VFR BLD AUTO: 35.3 % (ref 36–46)
HGB BLD-MCNC: 12.1 G/DL (ref 12–16)
HOLD SPECIMEN: NORMAL
IMM GRANULOCYTES # BLD AUTO: 0.15 X10*3/UL (ref 0–0.7)
IMM GRANULOCYTES NFR BLD AUTO: 0.9 % (ref 0–0.9)
LACTATE SERPL-SCNC: 1.3 MMOL/L (ref 0.4–2)
LYMPHOCYTES # BLD AUTO: 0.65 X10*3/UL (ref 1.2–4.8)
LYMPHOCYTES NFR BLD AUTO: 3.9 %
MCH RBC QN AUTO: 30.2 PG (ref 26–34)
MCHC RBC AUTO-ENTMCNC: 34.3 G/DL (ref 32–36)
MCV RBC AUTO: 88 FL (ref 80–100)
MONOCYTES # BLD AUTO: 0.75 X10*3/UL (ref 0.1–1)
MONOCYTES NFR BLD AUTO: 4.5 %
N GONORRHOEA DNA SPEC QL PROBE+SIG AMP: NEGATIVE
NEUTROPHILS # BLD AUTO: 14.86 X10*3/UL (ref 1.2–7.7)
NEUTROPHILS NFR BLD AUTO: 89.3 %
NRBC BLD-RTO: 0 /100 WBCS (ref 0–0)
PLATELET # BLD AUTO: 219 X10*3/UL (ref 150–450)
POTASSIUM SERPL-SCNC: 3.5 MMOL/L (ref 3.5–5.3)
PROT SERPL-MCNC: 8.1 G/DL (ref 6.4–8.2)
RBC # BLD AUTO: 4.01 X10*6/UL (ref 4–5.2)
SODIUM SERPL-SCNC: 138 MMOL/L (ref 136–145)
WBC # BLD AUTO: 16.7 X10*3/UL (ref 4.4–11.3)

## 2025-04-20 PROCEDURE — 2500000002 HC RX 250 W HCPCS SELF ADMINISTERED DRUGS (ALT 637 FOR MEDICARE OP, ALT 636 FOR OP/ED): Performed by: EMERGENCY MEDICINE

## 2025-04-20 PROCEDURE — 96361 HYDRATE IV INFUSION ADD-ON: CPT

## 2025-04-20 PROCEDURE — 2500000001 HC RX 250 WO HCPCS SELF ADMINISTERED DRUGS (ALT 637 FOR MEDICARE OP): Performed by: PHYSICIAN ASSISTANT

## 2025-04-20 PROCEDURE — 2500000004 HC RX 250 GENERAL PHARMACY W/ HCPCS (ALT 636 FOR OP/ED): Mod: JZ | Performed by: EMERGENCY MEDICINE

## 2025-04-20 PROCEDURE — 36415 COLL VENOUS BLD VENIPUNCTURE: CPT | Performed by: EMERGENCY MEDICINE

## 2025-04-20 PROCEDURE — G0378 HOSPITAL OBSERVATION PER HR: HCPCS

## 2025-04-20 PROCEDURE — 93005 ELECTROCARDIOGRAM TRACING: CPT

## 2025-04-20 PROCEDURE — 80053 COMPREHEN METABOLIC PANEL: CPT | Performed by: EMERGENCY MEDICINE

## 2025-04-20 PROCEDURE — 76882 US LMTD JT/FCL EVL NVASC XTR: CPT | Performed by: STUDENT IN AN ORGANIZED HEALTH CARE EDUCATION/TRAINING PROGRAM

## 2025-04-20 PROCEDURE — 2500000004 HC RX 250 GENERAL PHARMACY W/ HCPCS (ALT 636 FOR OP/ED): Mod: JZ

## 2025-04-20 PROCEDURE — 2500000002 HC RX 250 W HCPCS SELF ADMINISTERED DRUGS (ALT 637 FOR MEDICARE OP, ALT 636 FOR OP/ED): Performed by: PHYSICIAN ASSISTANT

## 2025-04-20 PROCEDURE — 83605 ASSAY OF LACTIC ACID: CPT | Performed by: EMERGENCY MEDICINE

## 2025-04-20 PROCEDURE — 85025 COMPLETE CBC W/AUTO DIFF WBC: CPT | Performed by: EMERGENCY MEDICINE

## 2025-04-20 PROCEDURE — 2500000004 HC RX 250 GENERAL PHARMACY W/ HCPCS (ALT 636 FOR OP/ED): Mod: JZ | Performed by: STUDENT IN AN ORGANIZED HEALTH CARE EDUCATION/TRAINING PROGRAM

## 2025-04-20 PROCEDURE — 96375 TX/PRO/DX INJ NEW DRUG ADDON: CPT

## 2025-04-20 PROCEDURE — 96365 THER/PROPH/DIAG IV INF INIT: CPT

## 2025-04-20 PROCEDURE — 99285 EMERGENCY DEPT VISIT HI MDM: CPT | Mod: 25 | Performed by: EMERGENCY MEDICINE

## 2025-04-20 PROCEDURE — 76882 US LMTD JT/FCL EVL NVASC XTR: CPT

## 2025-04-20 PROCEDURE — 99223 1ST HOSP IP/OBS HIGH 75: CPT | Performed by: STUDENT IN AN ORGANIZED HEALTH CARE EDUCATION/TRAINING PROGRAM

## 2025-04-20 RX ORDER — SERTRALINE HYDROCHLORIDE 50 MG/1
50 TABLET, FILM COATED ORAL DAILY
Status: DISCONTINUED | OUTPATIENT
Start: 2025-04-21 | End: 2025-04-21 | Stop reason: HOSPADM

## 2025-04-20 RX ORDER — ACETAMINOPHEN 325 MG/1
975 TABLET ORAL EVERY 6 HOURS PRN
Status: DISCONTINUED | OUTPATIENT
Start: 2025-04-20 | End: 2025-04-21 | Stop reason: HOSPADM

## 2025-04-20 RX ORDER — IBUPROFEN 600 MG/1
600 TABLET ORAL EVERY 6 HOURS PRN
Status: DISCONTINUED | OUTPATIENT
Start: 2025-04-20 | End: 2025-04-21 | Stop reason: HOSPADM

## 2025-04-20 RX ORDER — SULFAMETHOXAZOLE AND TRIMETHOPRIM 800; 160 MG/1; MG/1
2 TABLET ORAL 2 TIMES DAILY
Status: DISCONTINUED | OUTPATIENT
Start: 2025-04-20 | End: 2025-04-21 | Stop reason: HOSPADM

## 2025-04-20 RX ORDER — SULFAMETHOXAZOLE AND TRIMETHOPRIM 800; 160 MG/1; MG/1
1 TABLET ORAL ONCE
Status: COMPLETED | OUTPATIENT
Start: 2025-04-20 | End: 2025-04-20

## 2025-04-20 RX ORDER — MORPHINE SULFATE 4 MG/ML
4 INJECTION INTRAVENOUS ONCE
Status: COMPLETED | OUTPATIENT
Start: 2025-04-20 | End: 2025-04-20

## 2025-04-20 RX ORDER — EMTRICITABINE AND TENOFOVIR DISOPROXIL FUMARATE 200; 300 MG/1; MG/1
1 TABLET, FILM COATED ORAL DAILY
Status: DISCONTINUED | OUTPATIENT
Start: 2025-04-21 | End: 2025-04-21 | Stop reason: HOSPADM

## 2025-04-20 RX ORDER — CEFAZOLIN SODIUM 2 G/100ML
2 INJECTION, SOLUTION INTRAVENOUS ONCE
Status: COMPLETED | OUTPATIENT
Start: 2025-04-20 | End: 2025-04-20

## 2025-04-20 RX ADMIN — SULFAMETHOXAZOLE AND TRIMETHOPRIM 1 TABLET: 800; 160 TABLET ORAL at 14:47

## 2025-04-20 RX ADMIN — ACETAMINOPHEN 975 MG: 325 TABLET, FILM COATED ORAL at 20:43

## 2025-04-20 RX ADMIN — MORPHINE SULFATE 4 MG: 4 INJECTION, SOLUTION INTRAMUSCULAR; INTRAVENOUS at 16:10

## 2025-04-20 RX ADMIN — CEFAZOLIN SODIUM 2 G: 2 INJECTION, SOLUTION INTRAVENOUS at 14:47

## 2025-04-20 RX ADMIN — SODIUM CHLORIDE, SODIUM LACTATE, POTASSIUM CHLORIDE, AND CALCIUM CHLORIDE 1000 ML: .6; .31; .03; .02 INJECTION, SOLUTION INTRAVENOUS at 22:37

## 2025-04-20 RX ADMIN — SODIUM CHLORIDE 1000 ML: 0.9 INJECTION, SOLUTION INTRAVENOUS at 15:15

## 2025-04-20 RX ADMIN — SULFAMETHOXAZOLE AND TRIMETHOPRIM 2 TABLET: 800; 160 TABLET ORAL at 22:16

## 2025-04-20 RX ADMIN — IBUPROFEN 600 MG: 600 TABLET ORAL at 20:27

## 2025-04-20 ASSESSMENT — COLUMBIA-SUICIDE SEVERITY RATING SCALE - C-SSRS
6. HAVE YOU EVER DONE ANYTHING, STARTED TO DO ANYTHING, OR PREPARED TO DO ANYTHING TO END YOUR LIFE?: NO
2. HAVE YOU ACTUALLY HAD ANY THOUGHTS OF KILLING YOURSELF?: NO
1. IN THE PAST MONTH, HAVE YOU WISHED YOU WERE DEAD OR WISHED YOU COULD GO TO SLEEP AND NOT WAKE UP?: NO

## 2025-04-20 ASSESSMENT — PAIN DESCRIPTION - PAIN TYPE
TYPE: ACUTE PAIN
TYPE: ACUTE PAIN

## 2025-04-20 ASSESSMENT — PAIN - FUNCTIONAL ASSESSMENT
PAIN_FUNCTIONAL_ASSESSMENT: 0-10
PAIN_FUNCTIONAL_ASSESSMENT: 0-10

## 2025-04-20 ASSESSMENT — PAIN SCALES - GENERAL
PAINLEVEL_OUTOF10: 4
PAINLEVEL_OUTOF10: 10 - WORST POSSIBLE PAIN
PAINLEVEL_OUTOF10: 1
PAINLEVEL_OUTOF10: 4

## 2025-04-20 ASSESSMENT — PAIN DESCRIPTION - ORIENTATION: ORIENTATION: RIGHT

## 2025-04-20 ASSESSMENT — PAIN DESCRIPTION - LOCATION
LOCATION: OTHER (COMMENT)
LOCATION: GROIN
LOCATION: GROIN
LOCATION: OTHER (COMMENT)

## 2025-04-20 ASSESSMENT — PAIN DESCRIPTION - PROGRESSION: CLINICAL_PROGRESSION: GRADUALLY IMPROVING

## 2025-04-20 ASSESSMENT — ENCOUNTER SYMPTOMS: PAIN: 1

## 2025-04-20 NOTE — ED PROVIDER NOTES
Emergency Department Provider Note          History of Present Illness     CC: Groin Swelling     History provided by: Patient and Family Member  Limitations to History: None    HPI:   Inga Sams is a 34 y.o.female with PMH gallbladder removal in  presenting to the Emergency Department for groin swelling.    Presenting with chief concern of groin swelling.  Patient presented to [facility] ED with groin swelling, UTI.  Patient presented to [facility] ED yesterday with similar symptoms.  Patient experienced right-sided inguinal/groin pain for the past 5 to 6 days that progressively worsened.  Patient denies any dysuria/hematuria.  Patient endorsed vaginal discharge. Patient reports recent yeast infection that was self treated.  Patient states that she was treated yesterday at [facility] with antibiotics and was discharged on oral antibiotics.  However patient states that the time of discharge she had fever of 101.8 °F.  Patient also states that when she was discharged she noticed erythema on her right medial aspect of her upper thigh.  Patient then used a pen to jennifer out the erythema.  Patient states that the erythema surpassed her markings so she presented to ED.  Patient also mentions that she noticed her right sided abdominal pain today with movement.  Patient denies nausea, vomiting, diarrhea, shortness of breath, chest pain.      Records Reviewed: Recent available ED and inpatient notes reviewed in EMR.    PMHx/PSHx:  Per HPI.   - has no past medical history on file.  - has a past surgical history that includes ASD repair.  - has Onychomycosis; Exposure to HIV; History of spontaneous ; History of maternal cardiac surgery; COVID-19 affecting pregnancy in first trimester ([facility]); Reactive depression; Prenatal care, subsequent pregnancy in third trimester; Fetal cardiac anomaly affecting pregnancy, antepartum; and Labor and delivery indication for care or intervention ([facility]) on their problem  list.    Medications:  Current Outpatient Medications   Medication Instructions    bacitracin 500 unit/gram ointment Topical, 2 times daily    cephalexin (KEFLEX) 500 mg, oral, 4 times daily    clotrimazole-betamethasone (Lotrisone) cream 1 Application, Topical, 2 times daily    emtricitabine-tenofovir, TDF, (Truvada) 200-300 mg tablet 1 tablet, oral, Daily    sertraline (ZOLOFT) 50 mg, oral, Daily, as directed        Allergies:  Patient has no known allergies.    Social History:  - Tobacco:  reports that she has never smoked. She has never used smokeless tobacco.   - Alcohol:  reports current alcohol use.   - Illicit Drugs:  reports no history of drug use.     ROS:  Per HPI.       Physical Exam     Triage Vitals:  T 37.3 °C (99.1 °F)  HR (!) 101  BP (!) 129/92  RR 16  O2 98 % None (Room air)    General: Awake, alert, in no acute distress  Eyes: Gaze conjugate.  No scleral icterus or injection  HENT: Normo-cephalic, atraumatic. No stridor  CV: Tachycardic rate, regular rhythm. Radial pulses 2+ bilaterally  Resp: Breathing non-labored, speaking in full sentences.  Clear to auscultation bilaterally  GI: Soft, non-distended, non-tender. No rebound or guarding.  : Right upper thigh erythema with pen marking original site of erythema.  Groin edema is noted.  MSK/Extremities: No gross bony deformities. Moving all extremities  Skin: Warm. Appropriate color  Neuro: Alert. Oriented. Face symmetric. Speech is fluent.  Gross strength and sensation intact in b/l UE and LEs  Psych: Appropriate mood and affect          Sonja Coma Scale Score: 15                    Medical Decision Making & ED Course     EKG: Please see ED Course for full interpretation.    Medical Decision Making   Inga Sams is a 34 y.o.female presenting to the Emergency Department for right groin edema.    The patient presented to the ED with complaints of right groin edema, erythema. Vitals were notable for mild tachycardia with a heart rate of 101.  Physical exam was significant for erythema noted to anterior medial aspect of right upper thigh. Most likely diagnosis was UTI and right inguinal adenopathy. Other differentials such as a sexually transmitted infection were considered. Labs including lactate, CMP, CBC were ordered.  Patient had workup at [facility] yesterday including urinalysis, CBC, CMP, lipase, STD panel.lab work from yesterday were significant for a white blood cell count of 20, and leukocyte esterase count of 500.  Imaging including CT abdomen pelvis with IV contrast was performed yesterday.  Impressions at that CT were significant for a 25 x 17 x 22 mm right inguinal adenopathy with moderate surrounding soft tissue stranding with adjacent skin thickening likely secondary to a reactive lymph node., EKG was also ordered. Patient was treated with Bactrim, Ancef and IV fluids. Results were reviewed and were significant for white blood cell count of 16.7. On reexamination of the patient, it was determined that the most appropriate option at this time would be admit to CDU for cellulitis . I reviewed the case with the attending ED physician Dr. Mendel Solano. The attending ED physician agrees with the plan. Patient and/or patient’s representative was counseled regarding labs, imaging, likely diagnosis, and plan. All questions were answered    Diagnoses as of 04/20/25 1702   Inguinal swelling   Cellulitis of groin         Independent Result Review and Interpretation: Relevant laboratory and radiographic results were reviewed.  As necessary, they are commented on in the ED Course.    Chronic conditions affecting the patient's care: As documented above in MDM.      Disposition   CDU.  Patient is currently stable.  Admitted to CDU for cellulitis to ensure margins are improving.     Nae Morales DPM  Podiatry PGY-1      Procedures     Procedures ? SmartLinks last updated 4/20/2025 2:56 PM          Nae Morales DPM  Resident  04/20/25 1701

## 2025-04-20 NOTE — H&P
History and Physical  [facility] CLINICAL DECISION  Patient: Inga Sams  MRN: [id number]  : 1990  Date of Evaluation: 2025  ED Provider: Brandt Pastrana PA-C      Limitations to history: None  Independent Historian: Yes  External Records Reviewed: Recent and relevant inpatient and outpatient notes in EMR      Patient History:  Inga Sams is a 34 y.o. female whom is admitted to the Clinical Decision Unit for cellulitis of the right groin area.  Patient has had pain and redness to her right groin for the past 5 days.  States the redness and pain has progressively worsened.  She was seen at [facility] emergency department yesterday and was treated with antibiotics and discharged on Keflex.  She reports after discharge she was febrile and noted that the area of erythema was spreading.  Also, she developed some pain to her right lower abdomen with movement.  She denies headache chest pain shortness of breath nausea vomiting dysuria hematuria urgency or frequency.  Patient reports recent vaginal discharge in which she self treated for yeast infection.    The acute evaluation included:  Orders Placed This Encounter   Procedures    Point of Care Ultrasound    CBC and Auto Differential    Comprehensive metabolic panel    Lactate    Extra Tubes    SST TOP    ECG 12 lead    Initiate Observation Send to CDU       I reviewed the below labs and imaging as ordered by the ED provider:  Point of Care Ultrasound             Labs Reviewed   CBC WITH AUTO DIFFERENTIAL - Abnormal       Result Value    WBC 16.7 (*)     nRBC 0.0      RBC 4.01      Hemoglobin 12.1      Hematocrit 35.3 (*)     MCV 88      MCH 30.2      MCHC 34.3      RDW 11.8      Platelets 219      Neutrophils % 89.3      Immature Granulocytes %, Automated 0.9      Lymphocytes % 3.9      Monocytes % 4.5      Eosinophils % 1.2      Basophils % 0.2      Neutrophils Absolute 14.86 (*)     Immature Granulocytes Absolute, Automated 0.15       Lymphocytes Absolute 0.65 (*)     Monocytes Absolute 0.75      Eosinophils Absolute 0.20      Basophils Absolute 0.04     COMPREHENSIVE METABOLIC PANEL - Normal    Glucose 89      Sodium 138      Potassium 3.5      Chloride 102      Bicarbonate 29      Anion Gap 11      Urea Nitrogen 10      Creatinine 0.66      eGFR >90      Calcium 9.5      Albumin 4.0      Alkaline Phosphatase 105      Total Protein 8.1      AST 16      Bilirubin, Total 0.3      ALT 13     LACTATE - Normal    Lactate 1.3      Narrative:     Venipuncture immediately after or during the administration of Metamizole may lead to falsely low results. Testing should be performed immediately prior to Metamizole dosing.           After discussion with the ED provider, a decision was made to admit the patient to the Clinical Decision Unit.    Upon admission to the Clinical Decision Unit, Mrs. Sams is alert and oriented x 4 and appearing no acute distress.  Patient was initially tachycardic with a heart rate of 111 with remaining vital signs within normal limits and afebrile.  Medical workup initiated emergency department included EKG, laboratory studies and imaging.  Diagnostic information was obtained, reviewed and discussed with the patient.  CBC and differential did have a leukocytosis of 16.7 with a left shift and decreased lymphocytes of 0.65.  CMP was within normal limits.  hCG was negative.  Imaging including CT abdomen pelvis with IV contrast was performed yesterday.  Impressions at that CT were significant for a 25 x 17 x 22 mm right inguinal adenopathy with moderate surrounding soft tissue stranding with adjacent skin thickening likely secondary to a reactive lymph node. POCUS was notable for a swollen lymph node in the right groin area.  Patient marked the margins of the erythema in her right groin at home prior to arrival in the ED today.  My assessment it appears that the erythema is extending outside of the margins at this time and will  "continue to monitor.  Patient was placed into the CDU for close observation and antibiotic treatment.    Past History   Medical History[1]  Surgical History[2]  Social History[3]      Medications/Allergies     Previous Medications    BACITRACIN 500 UNIT/GRAM OINTMENT    Apply topically 2 times a day.    CEPHALEXIN (KEFLEX) 500 MG CAPSULE    Take 1 capsule (500 mg) by mouth 4 times a day for 10 days.    CLOTRIMAZOLE-BETAMETHASONE (LOTRISONE) CREAM    Apply 1 Application topically 2 times a day for 28 days.    EMTRICITABINE-TENOFOVIR, TDF, (TRUVADA) 200-300 MG TABLET    Take 1 tablet by mouth once daily.    SERTRALINE (ZOLOFT) 50 MG TABLET    TAKE 1 TABLET DAILY AS DIRECTED     Allergies[4]      Review of Systems  All systems reviewed and otherwise negative, except as stated above in HPI.      Physical Exam     Visit Vitals  /67   Pulse (!) 111   Temp 37.3 °C (99.1 °F) (Tympanic)   Resp 16   Ht 1.575 m (5' 2\")   Wt 68 kg (150 lb)   LMP 03/01/2025 (Approximate)   SpO2 100%   BMI 27.44 kg/m²   OB Status Having periods   Smoking Status Never   BSA 1.72 m²         Physical exam    VS: As documented in the triage note and EMR flowsheet from this visit were reviewed.    General: Patient is AAOx3, normally developed and nourished, is a good historian, answers questions appropriately    HEENT: head normocephalic, atraumatic, EOMs intact, oropharynx without erythema or exudate, buccal mucosa intact without lesions, nose is patent bilateral    Neck: supple, full ROM, negative for lymphadenopathy, JVD, thyromegaly, tracheal deviation, nuchal rigidity    Pulmonary: Clear to auscultation bilaterally, No wheezing, rales, or rhonchi, no accessory muscle use, able to speak full clear sentences    Cardiac: Normal rate and rhythm, no murmurs, rubs or gallops    GI: soft, non-tender, non-distended, normoactive bowelsounds in all four quadrants, no masses or organomegaly, no guarding or CVA tenderness noted    Musculoskeletal: full " weight bearing, MAZARIEGOS, no joint effusions, clubbing or edema noted    Skin: Warm, dry, intact, area of erythema to the right groin and right upper thigh and lower abdomen area which has been outlined on the margins, tender to palpation, no fluctuance, skin intact with no drainage or bleeding., turgor is good.    Neuro: patient follow commands, cranial nerves 2-12 grossly intact, motor strengths 5/5 upper and lower extremities, sensation are symmetrical. No focal deficits.    Psych: Appropriate mood and affect for situation      Consultants  1) N/A      Impression and Plan  In summary, Inga Sams is admitted to the [facility] Clinical Decision Unit for cellulitis of the right groin. Dr. Jama is the CDU admission attending.    This patient has been risk-stratified based on available history, physical exam, and related study findings. Admission to the observation status for further diagnosis/treatment/monitoring of cellulitis of the right groin is warranted clinically. This extended period of observation is specifically required to determine the need for hospitalization.     The goals of this admission based on the patient’s clinical problem list are:  1) stable vital signs  2) symptomatic improvement    Assessment/ Plan  -Bactrim DS p.o. twice daily  - Acetaminophen 975 mg tablet p.o. every 6 hours as needed  - Ibuprofen 600 mg p.o. every 6 hours as needed  - Monitor for improving margins of the cellulitic area    When met, appropriate disposition will be arranged.          [1] No past medical history on file.  [2]   Past Surgical History:  Procedure Laterality Date    ASD REPAIR     [3]   Social History  Socioeconomic History    Marital status:    Tobacco Use    Smoking status: Never    Smokeless tobacco: Never   Vaping Use    Vaping status: Never Used   Substance and Sexual Activity    Alcohol use: Yes     Comment: occasional    Drug use: Never     Social Drivers of Health      Financial Resource Strain: Patient Declined (2/21/2024)    Overall Financial Resource Strain (CARDIA)     Difficulty of Paying Living Expenses: Patient declined   Food Insecurity: No Food Insecurity (1/22/2021)    Received from [facility]    Hunger Vital Sign     Worried About Running Out of Food in the Last Year: Never true     Ran Out of Food in the Last Year: Never true   Transportation Needs: No Transportation Needs (2/21/2024)    PRAPARE - Transportation     Lack of Transportation (Medical): No     Lack of Transportation (Non-Medical): No   Physical Activity: Inactive (1/22/2021)    Received from [facility]    Exercise Vital Sign     Days of Exercise per Week: 0 days     Minutes of Exercise per Session: 0 min   Stress: No Stress Concern Present (1/22/2021)    Received from [facility]    [employer] - Occupational Stress Questionnaire     Feeling of Stress : Only a little   Social Connections: Moderately Isolated (1/22/2021)    Received from [facility]    Social Connection and Isolation Panel [NHANES]     Frequency of Communication with Friends and Family: More than three times a week     Frequency of Social Gatherings with Friends and Family: More than three times a week     Attends Mandaeism Services: Never     Active Member of Clubs or Organizations: No     Attends Club or Organization Meetings: Never     Marital Status:    Housing Stability: Unknown (1/22/2021)    Received from [facility]    Housing Stability Vital Sign     Unable to Pay for Housing in the Last Year: No     Unstable Housing in the Last Year: No   [4] No Known Allergies

## 2025-04-20 NOTE — ED TRIAGE NOTES
Pt presents to the ED with complaints of right sided groin swelling that started Tuesday. Pt states that she went to the [facility] yesterday and was diagnosed with a Lymphatic infection that she was started on Keflex for. Pt states that she was instructed to come back with she was having worsening pain and redness. Pt states the redness has gotten much worse and is not spreading across her groin. Pt states she feels better than she did yesterday but the redness is continuing to increase. Pt has no other PMHX

## 2025-04-20 NOTE — Clinical Note
Is the patient on isolation?: No  Do you expect the patient to require telemetry (informational-only for bed management): No  Do you expect the patient to require observation or inpt? (informational-only for bed management): Observation  Bed request  comments: cdu

## 2025-04-20 NOTE — ED PROCEDURE NOTE
Procedure    Performed by: Nathaniel Chawla DO  Authorized by: Davon Jama MD            Integumentary Indications: pain, redness, swelling and tender to palpation          Procedure: Soft Tissue Ultrasound    Findings:  Fluid Collection: NO fluid collection was identified.  Cobblestoning: NO cobblestoning was identified  Color Doppler: Abnormal hyperemia or vasculature was seen.    Impression:  Soft Tissue: The soft tissue ultrasound exam had ABNORMAL findings as specified.      Comments: Patient had groin swelling with redness in right inguinal region. Larger structure with color doppler and flow looks similar to lymph node and is in the superior region in comparison to the smaller structure with color doppler and flow in same study.               Nathaniel Chawla DO  Resident  04/20/25 7414

## 2025-04-21 VITALS
OXYGEN SATURATION: 97 % | HEIGHT: 62 IN | TEMPERATURE: 97.7 F | BODY MASS INDEX: 27.6 KG/M2 | DIASTOLIC BLOOD PRESSURE: 61 MMHG | SYSTOLIC BLOOD PRESSURE: 109 MMHG | HEART RATE: 87 BPM | RESPIRATION RATE: 17 BRPM | WEIGHT: 150 LBS

## 2025-04-21 LAB
ATRIAL RATE: 103 BPM
BACTERIA UR CULT: NORMAL
P AXIS: 70 DEGREES
P OFFSET: 193 MS
P ONSET: 143 MS
PR INTERVAL: 162 MS
Q ONSET: 224 MS
QRS COUNT: 17 BEATS
QRS DURATION: 80 MS
QT INTERVAL: 324 MS
QTC CALCULATION(BAZETT): 424 MS
QTC FREDERICIA: 388 MS
R AXIS: 81 DEGREES
T AXIS: 78 DEGREES
T OFFSET: 386 MS
VENTRICULAR RATE: 103 BPM

## 2025-04-21 PROCEDURE — 99238 HOSP IP/OBS DSCHRG MGMT 30/<: CPT | Performed by: NURSE PRACTITIONER

## 2025-04-21 PROCEDURE — 2500000001 HC RX 250 WO HCPCS SELF ADMINISTERED DRUGS (ALT 637 FOR MEDICARE OP)

## 2025-04-21 PROCEDURE — G0378 HOSPITAL OBSERVATION PER HR: HCPCS

## 2025-04-21 PROCEDURE — 93010 ELECTROCARDIOGRAM REPORT: CPT

## 2025-04-21 PROCEDURE — 2500000002 HC RX 250 W HCPCS SELF ADMINISTERED DRUGS (ALT 637 FOR MEDICARE OP, ALT 636 FOR OP/ED)

## 2025-04-21 PROCEDURE — 2500000002 HC RX 250 W HCPCS SELF ADMINISTERED DRUGS (ALT 637 FOR MEDICARE OP, ALT 636 FOR OP/ED): Performed by: PHYSICIAN ASSISTANT

## 2025-04-21 RX ORDER — IBUPROFEN 600 MG/1
600 TABLET ORAL EVERY 6 HOURS PRN
Qty: 20 TABLET | Refills: 0 | Status: SHIPPED | OUTPATIENT
Start: 2025-04-21 | End: 2025-04-23 | Stop reason: ALTCHOICE

## 2025-04-21 RX ORDER — ACETAMINOPHEN 325 MG/1
650 TABLET ORAL EVERY 6 HOURS PRN
Qty: 20 TABLET | Refills: 0 | Status: SHIPPED | OUTPATIENT
Start: 2025-04-21 | End: 2025-04-26

## 2025-04-21 RX ORDER — SULFAMETHOXAZOLE AND TRIMETHOPRIM 800; 160 MG/1; MG/1
2 TABLET ORAL 2 TIMES DAILY
Qty: 28 TABLET | Refills: 0 | Status: SHIPPED | OUTPATIENT
Start: 2025-04-21 | End: 2025-04-28

## 2025-04-21 RX ADMIN — EMTRICITABINE AND TENOFOVIR DISOPROXIL FUMARATE 1 TABLET: 200; 300 TABLET, FILM COATED ORAL at 08:57

## 2025-04-21 RX ADMIN — SERTRALINE HYDROCHLORIDE 50 MG: 50 TABLET ORAL at 08:57

## 2025-04-21 RX ADMIN — SULFAMETHOXAZOLE AND TRIMETHOPRIM 2 TABLET: 800; 160 TABLET ORAL at 08:57

## 2025-04-21 NOTE — DISCHARGE SUMMARY
Disposition Note  [facility] CLINICAL DECISION  Patient: Inga Sams  MRN: [id number]  : 1990  Date of Evaluation: 2025  ED Provider: OPAL Silverman DNP      Limitations to history: None   Independent Historian: Yes  External Records Reviewed: EMR       Subjective:    Inga Sams is a 34 y.o. female has undergone comprehensive diagnostic evaluation and therapeutic management in accordance with the CDU guidelines for right groin cellulitis.. Based on the patient's clinical response and diagnostic information during this period of observation, it has been determined that the patient will be discharged.     The acute evaluation included:  Orders Placed This Encounter   Procedures    Point of Care Ultrasound    CBC and Auto Differential    Comprehensive metabolic panel    Lactate    Extra Tubes    SST TOP    Adult diet Regular    ECG 12 lead    Initiate Observation Send to CDU         Placed in observation at:         Past History   Medical History[1]  Surgical History[2]  Social History[3]      Medications/Allergies     Previous Medications    BACITRACIN 500 UNIT/GRAM OINTMENT    Apply topically 2 times a day.    CEPHALEXIN (KEFLEX) 500 MG CAPSULE    Take 1 capsule (500 mg) by mouth 4 times a day for 10 days.    CLOTRIMAZOLE-BETAMETHASONE (LOTRISONE) CREAM    Apply 1 Application topically 2 times a day for 28 days.    EMTRICITABINE-TENOFOVIR, TDF, (TRUVADA) 200-300 MG TABLET    Take 1 tablet by mouth once daily.    SERTRALINE (ZOLOFT) 50 MG TABLET    TAKE 1 TABLET DAILY AS DIRECTED     Allergies[4]      Review of Systems  All systems reviewed and otherwise negative, except as stated above in HPI.    Diagnostics reviewed by OPAL Silverman DNP     Labs:  Results for orders placed or performed during the hospital encounter of 25   SST TOP    Collection Time: 25  2:37 PM   Result Value Ref Range    Extra Tube Hold for add-ons.    CBC and Auto  Differential    Collection Time: 04/20/25  2:39 PM   Result Value Ref Range    WBC 16.7 (H) 4.4 - 11.3 x10*3/uL    nRBC 0.0 0.0 - 0.0 /100 WBCs    RBC 4.01 4.00 - 5.20 x10*6/uL    Hemoglobin 12.1 12.0 - 16.0 g/dL    Hematocrit 35.3 (L) 36.0 - 46.0 %    MCV 88 80 - 100 fL    MCH 30.2 26.0 - 34.0 pg    MCHC 34.3 32.0 - 36.0 g/dL    RDW 11.8 11.5 - 14.5 %    Platelets 219 150 - 450 x10*3/uL    Neutrophils % 89.3 40.0 - 80.0 %    Immature Granulocytes %, Automated 0.9 0.0 - 0.9 %    Lymphocytes % 3.9 13.0 - 44.0 %    Monocytes % 4.5 2.0 - 10.0 %    Eosinophils % 1.2 0.0 - 6.0 %    Basophils % 0.2 0.0 - 2.0 %    Neutrophils Absolute 14.86 (H) 1.20 - 7.70 x10*3/uL    Immature Granulocytes Absolute, Automated 0.15 0.00 - 0.70 x10*3/uL    Lymphocytes Absolute 0.65 (L) 1.20 - 4.80 x10*3/uL    Monocytes Absolute 0.75 0.10 - 1.00 x10*3/uL    Eosinophils Absolute 0.20 0.00 - 0.70 x10*3/uL    Basophils Absolute 0.04 0.00 - 0.10 x10*3/uL   Comprehensive metabolic panel    Collection Time: 04/20/25  2:39 PM   Result Value Ref Range    Glucose 89 74 - 99 mg/dL    Sodium 138 136 - 145 mmol/L    Potassium 3.5 3.5 - 5.3 mmol/L    Chloride 102 98 - 107 mmol/L    Bicarbonate 29 21 - 32 mmol/L    Anion Gap 11 10 - 20 mmol/L    Urea Nitrogen 10 6 - 23 mg/dL    Creatinine 0.66 0.50 - 1.05 mg/dL    eGFR >90 >60 mL/min/1.73m*2    Calcium 9.5 8.6 - 10.6 mg/dL    Albumin 4.0 3.4 - 5.0 g/dL    Alkaline Phosphatase 105 33 - 110 U/L    Total Protein 8.1 6.4 - 8.2 g/dL    AST 16 9 - 39 U/L    Bilirubin, Total 0.3 0.0 - 1.2 mg/dL    ALT 13 7 - 45 U/L   Lactate    Collection Time: 04/20/25  2:39 PM   Result Value Ref Range    Lactate 1.3 0.4 - 2.0 mmol/L   ECG 12 lead    Collection Time: 04/20/25  3:09 PM   Result Value Ref Range    Ventricular Rate 103 BPM    Atrial Rate 103 BPM    [place] Interval 162 ms    QRS Duration 80 ms    QT Interval 324 ms    QTC Calculation(Bazett) 424 ms    P Axis 70 degrees    R Axis 81 degrees    T Axis 78 degrees    QRS  "Count 17 beats    Q Onset 224 ms    P Onset 143 ms    P Offset 193 ms    T Offset 386 ms    QTC Fredericia 388 ms     Radiographs:  Point of Care Ultrasound   Final Result              Physical Exam     Visit Vitals  /66 (BP Location: Right arm, Patient Position: Lying)   Pulse 82   Temp 36.8 °C (98.2 °F) (Temporal)   Resp 17   Ht 1.575 m (5' 2\")   Wt 68 kg (150 lb)   LMP 03/01/2025 (Approximate)   SpO2 98%   BMI 27.44 kg/m²   OB Status Having periods   Smoking Status Never   BSA 1.72 m²       GENERAL:  The patient appears nourished and normally developed. Vital signs as documented.     HEENT:  Head normocephalic, atraumatic, EOMs intact, PERRLA, Mucous membranes moist. Nares patent without copious rhinorrhea.  No lymphadenopathy.    PULMONARY:  Lungs are clear to auscultation, without any respiratory distress. Able to speak full sentences, no accessory muscle use    CARDIAC:   Normal rate. No murmurs, rubs or gallops    ABDOMEN:  Soft, non-distended, non-tender, BS positive x 4 quadrants, No rebound or guarding, no peritoneal signs, no CVA tenderness, no masses or organomegaly    MUSCULOSKELETAL:   Able to ambulate, Non edematous, with no obvious deformities. Pulses intact distal    SKIN:   Good color, with no significant rashes.  No pallor.  Right upper inner thigh and right side of mons pubis with erythema and swelling, warmth.  Slightly tender to palpation.  No fluctuance.  Skin intact without drainage     NEURO:  No obvious neurological deficits, normal sensation and strength bilaterally.  Able to follow commands, NIH 0, CN 2-12 intact.    Psych: Appropriate mood and affect    Consultants  1) none       Impression and Plan    In summary, Inga Sams has been cared for according to the standard [facility] Clinical Decision Unit observation protocol for Cellulitis. This extended period of observation was specifically required to determine the need for hospitalization. Prior to " discharge from observation, the final physical exam is documented above.     Significant events during the course of observation based on the goals of the clinical problem list include:   1) right groin cellulitis   Received PO antibiotics here in the CDU with no fever throughout her stay      Based on the patient's condition and test results, the patient will be discharged.     Total length of observation was 20 hours. Dr. Jama is the CDU disposition attending.      Discharge Diagnosis  Cellulitis    Issues Requiring Follow-Up  Cellulitis     Discharge Meds     Your medication list        START taking these medications        Instructions Last Dose Given Next Dose Due   acetaminophen 325 mg tablet  Commonly known as: Tylenol      Take 2 tablets (650 mg) by mouth every 6 hours if needed for mild pain (1 - 3) for up to 5 days.       ibuprofen 600 mg tablet      Take 1 tablet (600 mg) by mouth every 6 hours if needed for mild pain (1 - 3) for up to 5 days.       sulfamethoxazole-trimethoprim 800-160 mg tablet  Commonly known as: Bactrim DS      Take 2 tablets by mouth 2 times a day for 7 days.              ASK your doctor about these medications        Instructions Last Dose Given Next Dose Due   bacitracin 500 unit/gram ointment      Apply topically 2 times a day.       cephalexin 500 mg capsule  Commonly known as: Keflex      Take 1 capsule (500 mg) by mouth 4 times a day for 10 days.       clotrimazole-betamethasone cream  Commonly known as: Lotrisone      Apply 1 Application topically 2 times a day for 28 days.       emtricitabine-tenofovir (TDF) 200-300 mg tablet  Commonly known as: Truvada      Take 1 tablet by mouth once daily.       metroNIDAZOLE 500 mg tablet  Commonly known as: Flagyl  Ask about: Should I take this medication?      Take 1 tablet (500 mg) by mouth 2 times a day for 7 days.       sertraline 50 mg tablet  Commonly known as: Zoloft      TAKE 1 TABLET DAILY AS DIRECTED                 Where to  Get Your Medications        These medications were sent to [id number] - [place], [place] - [id number]  [id number], [place] [address]      Phone: [phone]   acetaminophen 325 mg tablet  ibuprofen 600 mg tablet  sulfamethoxazole-trimethoprim 800-160 mg tablet         Test Results Pending At Discharge  Pending Labs       No current pending labs.            CDU COURSE:  This is a 34 year old female who was admitted to the CDU for PO antibiotics and to ensure that her cellulitis is not getting any worse.   She has remained a-febrile throughout her stay in the CDU and is feeling much improved and able to ambulate much easier. The area of concern has not gotten any worse. Medications were sent to her pharmacy and she was instructed to return to the ED with any worsening or concerning symptoms.       Outpatient Follow-Up  No future appointments.      Adenike Jean-Baptiste, APRN-CNP, DNP                 [1] No past medical history on file.  [2]   Past Surgical History:  Procedure Laterality Date    ASD REPAIR     [3]   Social History  Socioeconomic History    Marital status:    Tobacco Use    Smoking status: Never    Smokeless tobacco: Never   Vaping Use    Vaping status: Never Used   Substance and Sexual Activity    Alcohol use: Yes     Comment: occasional    Drug use: Never     Social Drivers of Health     Financial Resource Strain: Patient Declined (2/21/2024)    Overall Financial Resource Strain (CARDIA)     Difficulty of Paying Living Expenses: Patient declined   Food Insecurity: No Food Insecurity (1/22/2021)    Received from [facility]    Hunger Vital Sign     Worried About Running Out of Food in the Last Year: Never true     Ran Out of Food in the Last Year: Never true   Transportation Needs: No Transportation Needs (2/21/2024)    PRAPARE - Transportation     Lack of Transportation (Medical): No     Lack of Transportation (Non-Medical): No   Physical Activity: Inactive (1/22/2021)     Received from [facility]    Exercise Vital Sign     Days of Exercise per Week: 0 days     Minutes of Exercise per Session: 0 min   Stress: No Stress Concern Present (1/22/2021)    Received from [facility]    [employer] [place] [employer] - Occupational Stress Questionnaire     Feeling of Stress : Only a little   Social Connections: Moderately Isolated (1/22/2021)    Received from [facility]    Social Connection and Isolation Panel [NHANES]     Frequency of Communication with Friends and Family: More than three times a week     Frequency of Social Gatherings with Friends and Family: More than three times a week     Attends Congregational Services: Never     Active Member of Clubs or Organizations: No     Attends Club or Organization Meetings: Never     Marital Status:    Housing Stability: Unknown (1/22/2021)    Received from [facility]    Housing Stability Vital Sign     Unable to Pay for Housing in the Last Year: No     Unstable Housing in the Last Year: No   [4] No Known Allergies

## 2025-04-21 NOTE — PROGRESS NOTES
"Progress Note  Clinical Decision Unit  Patient: Inga Sams  MRN: [id number]  : 1990    Subjective  Inga Sams has been admitted to the CDU for 13 hours. Serial assessments of Inga Sams's clinical progress include:  1) tachycardia noted, started on IV fluids  2) oral analgesics for pain management  3) margins of erythema and swelling appear stable compared to my first assessment while in CDU; appears improved over the mons pubis.  While at rest patient states she has minimal pain.  Feels she is able to walk more quickly compared to previously    Objective  Last Recorded Vitals  Blood pressure 105/66, pulse 72, temperature 36.6 °C (97.9 °F), temperature source Temporal, resp. rate 16, height 1.575 m (5' 2\"), weight 68 kg (150 lb), last menstrual period 2025, SpO2 97%, currently breastfeeding.    Physical Exam  Appearance: Alert, oriented , cooperative,  in no acute distress. Well nourished & well hydrated.  Skin: Right upper inner thigh and right side of mons pubis with erythema and swelling, warmth.  Slightly tender to palpation.  No fluctuance.  Skin intact without drainage.  Eyes:  EOMs intact,  Conjunctiva pink with no redness or exudates. No scleral icterus.   ENT: Hearing grossly intact.  Pharynx clear, uvula midline.   Neck: Supple, Trachea at midline. No lymphadenopathy.  Pulmonary: Clear bilaterally with good chest wall excursion. No rales, rhonchi or wheezing. No accessory muscle use or stridor.  Cardiac: Normal S1, S2 without murmur, rub, gallop or extrasystole.   Abdomen: Soft, nontender.  No palpable organomegaly.  No rebound or guarding.    Musculoskeletal: Full range of motion. no pain, edema, or deformity. Pulses full and equal. No cyanosis, clubbing, or edema.  Neurological:  Cranial nerves II through XII are grossly intact, normal sensation, no weakness, no focal findings identified.  Psychiatric: Appropriate mood and affect.     Relevant Results  Imaging  Point of Care " Ultrasound              Cardiology, Vascular, and Other Imaging  ECG 12 lead  Result Date: 4/21/2025  Sinus tachycardia Nonspecific T wave abnormality Abnormal ECG No previous ECGs available See ED provider note for full interpretation and clinical correlation Confirmed by Mendy Alvarado ([id number]) on 4/21/2025 12:19:36 AM    Point of Care Ultrasound  Result Date: 4/20/2025  Nathaniel Chawla DO     4/20/2025  5:52 PM Performed by: Nathaniel Chawla DO Authorized by: Davon Jama MD  Integumentary Indications: pain, redness, swelling and tender to palpation Procedure: Soft Tissue Ultrasound Findings: Fluid Collection: NO fluid collection was identified. Cobblestoning: NO cobblestoning was identified Color Doppler: Abnormal hyperemia or vasculature was seen. Impression: Soft Tissue: The soft tissue ultrasound exam had ABNORMAL findings as specified. Comments: Patient had groin swelling with redness in right inguinal region. Larger structure with color doppler and flow looks similar to lymph node and is in the superior region in comparison to the smaller structure with color doppler and flow in same study.      Results for orders placed or performed during the hospital encounter of 04/20/25 (from the past 24 hours)   SST TOP   Result Value Ref Range    Extra Tube Hold for add-ons.    CBC and Auto Differential   Result Value Ref Range    WBC 16.7 (H) 4.4 - 11.3 x10*3/uL    nRBC 0.0 0.0 - 0.0 /100 WBCs    RBC 4.01 4.00 - 5.20 x10*6/uL    Hemoglobin 12.1 12.0 - 16.0 g/dL    Hematocrit 35.3 (L) 36.0 - 46.0 %    MCV 88 80 - 100 fL    MCH 30.2 26.0 - 34.0 pg    MCHC 34.3 32.0 - 36.0 g/dL    RDW 11.8 11.5 - 14.5 %    Platelets 219 150 - 450 x10*3/uL    Neutrophils % 89.3 40.0 - 80.0 %    Immature Granulocytes %, Automated 0.9 0.0 - 0.9 %    Lymphocytes % 3.9 13.0 - 44.0 %    Monocytes % 4.5 2.0 - 10.0 %    Eosinophils % 1.2 0.0 - 6.0 %    Basophils % 0.2 0.0 - 2.0 %    Neutrophils Absolute 14.86 (H) 1.20 - 7.70 x10*3/uL     Immature Granulocytes Absolute, Automated 0.15 0.00 - 0.70 x10*3/uL    Lymphocytes Absolute 0.65 (L) 1.20 - 4.80 x10*3/uL    Monocytes Absolute 0.75 0.10 - 1.00 x10*3/uL    Eosinophils Absolute 0.20 0.00 - 0.70 x10*3/uL    Basophils Absolute 0.04 0.00 - 0.10 x10*3/uL   Comprehensive metabolic panel   Result Value Ref Range    Glucose 89 74 - 99 mg/dL    Sodium 138 136 - 145 mmol/L    Potassium 3.5 3.5 - 5.3 mmol/L    Chloride 102 98 - 107 mmol/L    Bicarbonate 29 21 - 32 mmol/L    Anion Gap 11 10 - 20 mmol/L    Urea Nitrogen 10 6 - 23 mg/dL    Creatinine 0.66 0.50 - 1.05 mg/dL    eGFR >90 >60 mL/min/1.73m*2    Calcium 9.5 8.6 - 10.6 mg/dL    Albumin 4.0 3.4 - 5.0 g/dL    Alkaline Phosphatase 105 33 - 110 U/L    Total Protein 8.1 6.4 - 8.2 g/dL    AST 16 9 - 39 U/L    Bilirubin, Total 0.3 0.0 - 1.2 mg/dL    ALT 13 7 - 45 U/L   Lactate   Result Value Ref Range    Lactate 1.3 0.4 - 2.0 mmol/L   ECG 12 lead   Result Value Ref Range    Ventricular Rate 103 BPM    Atrial Rate 103 BPM    MS Interval 162 ms    QRS Duration 80 ms    QT Interval 324 ms    QTC Calculation(Bazett) 424 ms    P Axis 70 degrees    R Axis 81 degrees    T Axis 78 degrees    QRS Count 17 beats    Q Onset 224 ms    P Onset 143 ms    P Offset 193 ms    T Offset 386 ms    QTC Fredericia 388 ms       Scheduled medications  Scheduled Medications[1]  Continuous medications  Continuous Medications[2]  PRN medications  PRN Medications[3]    Assessment & Plan  Inguinal swelling    Cellulitis of groin      Assessment/Plan  Inga Sams continues to be managed in accordance with the CDU clinical guidelines for Cellulitis. An update of their clinical problem list included:   1) Cellulitis of Groin   -- PO Bactrim BID   -- As needed analgesics   -- Reassessment for improving margins         Mendy Alvarado PA-C  Emergency Medicine/Clinical Decision Unit   [facility]   Available via Secure Chat            [1]  emtricitabine-tenofovir (TDF), 1 tablet, oral, Daily  sertraline, 50 mg, oral, Daily  sulfamethoxazole-trimethoprim, 2 tablet, oral, BID     [2]    [3] PRN medications: acetaminophen, ibuprofen

## 2025-04-22 ENCOUNTER — HOSPITAL ENCOUNTER (INPATIENT)
Facility: HOSPITAL | Age: 35
LOS: 1 days | Discharge: HOME | DRG: 603 | End: 2025-04-24
Attending: EMERGENCY MEDICINE | Admitting: INTERNAL MEDICINE
Payer: COMMERCIAL

## 2025-04-22 DIAGNOSIS — L03.115 CELLULITIS OF RIGHT LOWER EXTREMITY: Primary | ICD-10-CM

## 2025-04-22 DIAGNOSIS — R60.0 LOCALIZED EDEMA: ICD-10-CM

## 2025-04-22 DIAGNOSIS — I82.811 SUPERFICIAL THROMBOSIS OF RIGHT LOWER EXTREMITY: ICD-10-CM

## 2025-04-22 DIAGNOSIS — L30.9 ECZEMA, UNSPECIFIED TYPE: ICD-10-CM

## 2025-04-22 PROCEDURE — 99285 EMERGENCY DEPT VISIT HI MDM: CPT | Performed by: EMERGENCY MEDICINE

## 2025-04-22 ASSESSMENT — PAIN DESCRIPTION - PAIN TYPE: TYPE: ACUTE PAIN

## 2025-04-22 ASSESSMENT — PAIN SCALES - GENERAL: PAINLEVEL_OUTOF10: 2

## 2025-04-22 ASSESSMENT — COLUMBIA-SUICIDE SEVERITY RATING SCALE - C-SSRS
2. HAVE YOU ACTUALLY HAD ANY THOUGHTS OF KILLING YOURSELF?: NO
6. HAVE YOU EVER DONE ANYTHING, STARTED TO DO ANYTHING, OR PREPARED TO DO ANYTHING TO END YOUR LIFE?: NO
1. IN THE PAST MONTH, HAVE YOU WISHED YOU WERE DEAD OR WISHED YOU COULD GO TO SLEEP AND NOT WAKE UP?: NO

## 2025-04-22 ASSESSMENT — PAIN DESCRIPTION - LOCATION: LOCATION: LEG

## 2025-04-22 ASSESSMENT — PAIN DESCRIPTION - ORIENTATION: ORIENTATION: RIGHT

## 2025-04-22 ASSESSMENT — PAIN - FUNCTIONAL ASSESSMENT: PAIN_FUNCTIONAL_ASSESSMENT: 0-10

## 2025-04-23 ENCOUNTER — APPOINTMENT (OUTPATIENT)
Dept: VASCULAR MEDICINE | Facility: HOSPITAL | Age: 35
DRG: 603 | End: 2025-04-23
Payer: COMMERCIAL

## 2025-04-23 ENCOUNTER — APPOINTMENT (OUTPATIENT)
Dept: RADIOLOGY | Facility: HOSPITAL | Age: 35
DRG: 603 | End: 2025-04-23
Payer: COMMERCIAL

## 2025-04-23 PROBLEM — L03.115 CELLULITIS OF RIGHT LOWER EXTREMITY: Status: ACTIVE | Noted: 2025-04-23

## 2025-04-23 LAB
ALBUMIN SERPL BCP-MCNC: 4.4 G/DL (ref 3.4–5)
ALP SERPL-CCNC: 114 U/L (ref 33–110)
ALT SERPL W P-5'-P-CCNC: 23 U/L (ref 7–45)
ANION GAP SERPL CALC-SCNC: 16 MMOL/L (ref 10–20)
APPEARANCE UR: CLEAR
AST SERPL W P-5'-P-CCNC: 22 U/L (ref 9–39)
BASOPHILS # BLD AUTO: 0.05 X10*3/UL (ref 0–0.1)
BASOPHILS NFR BLD AUTO: 0.4 %
BILIRUB SERPL-MCNC: 0.3 MG/DL (ref 0–1.2)
BILIRUB UR STRIP.AUTO-MCNC: NEGATIVE MG/DL
BUN SERPL-MCNC: 13 MG/DL (ref 6–23)
C TRACH RRNA SPEC QL NAA+PROBE: NEGATIVE
CALCIUM SERPL-MCNC: 9.7 MG/DL (ref 8.6–10.6)
CHLORIDE SERPL-SCNC: 101 MMOL/L (ref 98–107)
CK SERPL-CCNC: 60 U/L (ref 0–215)
CLUE CELLS SPEC QL WET PREP: NORMAL
CO2 SERPL-SCNC: 23 MMOL/L (ref 21–32)
COLOR UR: NORMAL
CREAT SERPL-MCNC: 0.74 MG/DL (ref 0.5–1.05)
CRP SERPL-MCNC: 9.57 MG/DL
EGFRCR SERPLBLD CKD-EPI 2021: >90 ML/MIN/1.73M*2
EOSINOPHIL # BLD AUTO: 0.29 X10*3/UL (ref 0–0.7)
EOSINOPHIL NFR BLD AUTO: 2.6 %
ERYTHROCYTE [DISTWIDTH] IN BLOOD BY AUTOMATED COUNT: 11.9 % (ref 11.5–14.5)
GLUCOSE SERPL-MCNC: 94 MG/DL (ref 74–99)
GLUCOSE UR STRIP.AUTO-MCNC: NORMAL MG/DL
HCT VFR BLD AUTO: 38.2 % (ref 36–46)
HGB BLD-MCNC: 13.1 G/DL (ref 12–16)
HIV 1+2 AB+HIV1 P24 AG SERPL QL IA: NONREACTIVE
HOLD SPECIMEN: NORMAL
HOLD SPECIMEN: NORMAL
IMM GRANULOCYTES # BLD AUTO: 0.06 X10*3/UL (ref 0–0.7)
IMM GRANULOCYTES NFR BLD AUTO: 0.5 % (ref 0–0.9)
KETONES UR STRIP.AUTO-MCNC: NEGATIVE MG/DL
LACTATE SERPL-SCNC: 1.2 MMOL/L (ref 0.4–2)
LEUKOCYTE ESTERASE UR QL STRIP.AUTO: NEGATIVE
LYMPHOCYTES # BLD AUTO: 2.05 X10*3/UL (ref 1.2–4.8)
LYMPHOCYTES NFR BLD AUTO: 18.3 %
MAGNESIUM SERPL-MCNC: 2.23 MG/DL (ref 1.6–2.4)
MCH RBC QN AUTO: 29.3 PG (ref 26–34)
MCHC RBC AUTO-ENTMCNC: 34.3 G/DL (ref 32–36)
MCV RBC AUTO: 86 FL (ref 80–100)
MONOCYTES # BLD AUTO: 0.62 X10*3/UL (ref 0.1–1)
MONOCYTES NFR BLD AUTO: 5.5 %
N GONORRHOEA DNA SPEC QL PROBE+SIG AMP: NEGATIVE
NEUTROPHILS # BLD AUTO: 8.15 X10*3/UL (ref 1.2–7.7)
NEUTROPHILS NFR BLD AUTO: 72.7 %
NITRITE UR QL STRIP.AUTO: NEGATIVE
NRBC BLD-RTO: 0 /100 WBCS (ref 0–0)
PH UR STRIP.AUTO: 6.5 [PH]
PLATELET # BLD AUTO: 312 X10*3/UL (ref 150–450)
POTASSIUM SERPL-SCNC: 3.6 MMOL/L (ref 3.5–5.3)
PREGNANCY TEST URINE, POC: NEGATIVE
PROCALCITONIN SERPL-MCNC: 0.12 NG/ML
PROT SERPL-MCNC: 8.8 G/DL (ref 6.4–8.2)
PROT UR STRIP.AUTO-MCNC: NEGATIVE MG/DL
RBC # BLD AUTO: 4.47 X10*6/UL (ref 4–5.2)
RBC # UR STRIP.AUTO: NEGATIVE MG/DL
SODIUM SERPL-SCNC: 136 MMOL/L (ref 136–145)
SP GR UR STRIP.AUTO: 1.01
T VAGINALIS RRNA SPEC QL NAA+PROBE: NEGATIVE
T VAGINALIS SPEC QL WET PREP: NORMAL
TREPONEMA PALLIDUM IGG+IGM AB [PRESENCE] IN SERUM OR PLASMA BY IMMUNOASSAY: NONREACTIVE
TRICHOMONAS REFLEX COMMENT: NORMAL
UROBILINOGEN UR STRIP.AUTO-MCNC: NORMAL MG/DL
WBC # BLD AUTO: 11.2 X10*3/UL (ref 4.4–11.3)
WBC VAG QL WET PREP: NORMAL
YEAST VAG QL WET PREP: NORMAL

## 2025-04-23 PROCEDURE — 81025 URINE PREGNANCY TEST: CPT

## 2025-04-23 PROCEDURE — 84145 PROCALCITONIN (PCT): CPT

## 2025-04-23 PROCEDURE — 36415 COLL VENOUS BLD VENIPUNCTURE: CPT

## 2025-04-23 PROCEDURE — 83605 ASSAY OF LACTIC ACID: CPT

## 2025-04-23 PROCEDURE — 2500000001 HC RX 250 WO HCPCS SELF ADMINISTERED DRUGS (ALT 637 FOR MEDICARE OP)

## 2025-04-23 PROCEDURE — 83735 ASSAY OF MAGNESIUM: CPT

## 2025-04-23 PROCEDURE — 93971 EXTREMITY STUDY: CPT

## 2025-04-23 PROCEDURE — 2500000002 HC RX 250 W HCPCS SELF ADMINISTERED DRUGS (ALT 637 FOR MEDICARE OP, ALT 636 FOR OP/ED)

## 2025-04-23 PROCEDURE — 87389 HIV-1 AG W/HIV-1&-2 AB AG IA: CPT

## 2025-04-23 PROCEDURE — 93971 EXTREMITY STUDY: CPT | Performed by: INTERNAL MEDICINE

## 2025-04-23 PROCEDURE — 99232 SBSQ HOSP IP/OBS MODERATE 35: CPT

## 2025-04-23 PROCEDURE — 80053 COMPREHEN METABOLIC PANEL: CPT

## 2025-04-23 PROCEDURE — 86140 C-REACTIVE PROTEIN: CPT

## 2025-04-23 PROCEDURE — 87081 CULTURE SCREEN ONLY: CPT

## 2025-04-23 PROCEDURE — 76881 US COMPL JOINT R-T W/IMG: CPT

## 2025-04-23 PROCEDURE — 87210 SMEAR WET MOUNT SALINE/INK: CPT

## 2025-04-23 PROCEDURE — 82550 ASSAY OF CK (CPK): CPT

## 2025-04-23 PROCEDURE — 87205 SMEAR GRAM STAIN: CPT

## 2025-04-23 PROCEDURE — 85025 COMPLETE CBC W/AUTO DIFF WBC: CPT

## 2025-04-23 PROCEDURE — 2500000004 HC RX 250 GENERAL PHARMACY W/ HCPCS (ALT 636 FOR OP/ED): Mod: JZ

## 2025-04-23 PROCEDURE — 81003 URINALYSIS AUTO W/O SCOPE: CPT

## 2025-04-23 PROCEDURE — 1210000001 HC SEMI-PRIVATE ROOM DAILY

## 2025-04-23 PROCEDURE — 76882 US LMTD JT/FCL EVL NVASC XTR: CPT | Performed by: RADIOLOGY

## 2025-04-23 PROCEDURE — RXMED WILLOW AMBULATORY MEDICATION CHARGE

## 2025-04-23 PROCEDURE — 87491 CHLMYD TRACH DNA AMP PROBE: CPT

## 2025-04-23 PROCEDURE — 87661 TRICHOMONAS VAGINALIS AMPLIF: CPT

## 2025-04-23 PROCEDURE — 87040 BLOOD CULTURE FOR BACTERIA: CPT

## 2025-04-23 PROCEDURE — 86780 TREPONEMA PALLIDUM: CPT

## 2025-04-23 PROCEDURE — 99222 1ST HOSP IP/OBS MODERATE 55: CPT | Performed by: STUDENT IN AN ORGANIZED HEALTH CARE EDUCATION/TRAINING PROGRAM

## 2025-04-23 RX ORDER — CLINDAMYCIN PHOSPHATE 900 MG/50ML
900 INJECTION, SOLUTION INTRAVENOUS ONCE
Status: DISCONTINUED | OUTPATIENT
Start: 2025-04-23 | End: 2025-04-23 | Stop reason: ALTCHOICE

## 2025-04-23 RX ORDER — ACETAMINOPHEN 325 MG/1
650 TABLET ORAL EVERY 6 HOURS PRN
Status: DISCONTINUED | OUTPATIENT
Start: 2025-04-23 | End: 2025-04-24 | Stop reason: HOSPADM

## 2025-04-23 RX ORDER — TRIAMCINOLONE ACETONIDE 1 MG/G
CREAM TOPICAL 2 TIMES DAILY
Qty: 80 G | Refills: 0 | Status: SHIPPED | OUTPATIENT
Start: 2025-04-23 | End: 2025-05-24

## 2025-04-23 RX ORDER — ONDANSETRON HYDROCHLORIDE 2 MG/ML
4 INJECTION, SOLUTION INTRAVENOUS EVERY 8 HOURS PRN
Status: DISCONTINUED | OUTPATIENT
Start: 2025-04-23 | End: 2025-04-24 | Stop reason: HOSPADM

## 2025-04-23 RX ORDER — SERTRALINE HYDROCHLORIDE 50 MG/1
50 TABLET, FILM COATED ORAL DAILY
Status: DISCONTINUED | OUTPATIENT
Start: 2025-04-23 | End: 2025-04-24 | Stop reason: HOSPADM

## 2025-04-23 RX ORDER — EMTRICITABINE AND TENOFOVIR DISOPROXIL FUMARATE 200; 300 MG/1; MG/1
1 TABLET, FILM COATED ORAL DAILY
Status: DISCONTINUED | OUTPATIENT
Start: 2025-04-23 | End: 2025-04-24 | Stop reason: HOSPADM

## 2025-04-23 RX ORDER — POLYETHYLENE GLYCOL 3350 17 G/17G
17 POWDER, FOR SOLUTION ORAL DAILY
Status: DISCONTINUED | OUTPATIENT
Start: 2025-04-23 | End: 2025-04-24 | Stop reason: HOSPADM

## 2025-04-23 RX ORDER — IBUPROFEN 600 MG/1
600 TABLET, FILM COATED ORAL EVERY 6 HOURS PRN
Status: DISCONTINUED | OUTPATIENT
Start: 2025-04-23 | End: 2025-04-23 | Stop reason: SDUPTHER

## 2025-04-23 RX ORDER — VANCOMYCIN HYDROCHLORIDE 1 G/200ML
1000 INJECTION, SOLUTION INTRAVENOUS ONCE
Status: DISCONTINUED | OUTPATIENT
Start: 2025-04-23 | End: 2025-04-23

## 2025-04-23 RX ORDER — ONDANSETRON 4 MG/1
4 TABLET, FILM COATED ORAL EVERY 8 HOURS PRN
Status: DISCONTINUED | OUTPATIENT
Start: 2025-04-23 | End: 2025-04-24 | Stop reason: HOSPADM

## 2025-04-23 RX ORDER — VANCOMYCIN HYDROCHLORIDE 1 G/200ML
1000 INJECTION, SOLUTION INTRAVENOUS EVERY 12 HOURS
Status: COMPLETED | OUTPATIENT
Start: 2025-04-23 | End: 2025-04-24

## 2025-04-23 RX ORDER — SULFAMETHOXAZOLE AND TRIMETHOPRIM 800; 160 MG/1; MG/1
2 TABLET ORAL 2 TIMES DAILY
Status: DISCONTINUED | OUTPATIENT
Start: 2025-04-23 | End: 2025-04-24 | Stop reason: HOSPADM

## 2025-04-23 RX ORDER — TRIAMCINOLONE ACETONIDE 1 MG/G
CREAM TOPICAL 2 TIMES DAILY
Status: DISCONTINUED | OUTPATIENT
Start: 2025-04-23 | End: 2025-04-24 | Stop reason: HOSPADM

## 2025-04-23 RX ORDER — EMTRICITABINE AND TENOFOVIR DISOPROXIL FUMARATE 200; 300 MG/1; MG/1
1 TABLET, FILM COATED ORAL DAILY
Status: DISCONTINUED | OUTPATIENT
Start: 2025-04-23 | End: 2025-04-23 | Stop reason: SDUPTHER

## 2025-04-23 RX ORDER — CLINDAMYCIN HYDROCHLORIDE 150 MG/1
450 CAPSULE ORAL EVERY 8 HOURS SCHEDULED
Status: DISCONTINUED | OUTPATIENT
Start: 2025-04-23 | End: 2025-04-23

## 2025-04-23 RX ADMIN — VANCOMYCIN HYDROCHLORIDE 1000 MG: 1 INJECTION, SOLUTION INTRAVENOUS at 17:49

## 2025-04-23 RX ADMIN — CLINDAMYCIN HYDROCHLORIDE 450 MG: 150 CAPSULE ORAL at 04:16

## 2025-04-23 RX ADMIN — EMTRICITABINE AND TENOFOVIR DISOPROXIL FUMARATE 1 TABLET: 200; 300 TABLET, FILM COATED ORAL at 08:21

## 2025-04-23 RX ADMIN — SERTRALINE 50 MG: 50 TABLET, FILM COATED ORAL at 08:21

## 2025-04-23 RX ADMIN — TRIAMCINOLONE ACETONIDE: 1 CREAM TOPICAL at 21:38

## 2025-04-23 RX ADMIN — SULFAMETHOXAZOLE AND TRIMETHOPRIM 2 TABLET: 800; 160 TABLET ORAL at 08:21

## 2025-04-23 SDOH — SOCIAL STABILITY: SOCIAL INSECURITY: WITHIN THE LAST YEAR, HAVE YOU BEEN AFRAID OF YOUR PARTNER OR EX-PARTNER?: NO

## 2025-04-23 SDOH — SOCIAL STABILITY: SOCIAL INSECURITY: WITHIN THE LAST YEAR, HAVE YOU BEEN HUMILIATED OR EMOTIONALLY ABUSED IN OTHER WAYS BY YOUR PARTNER OR EX-PARTNER?: NO

## 2025-04-23 SDOH — SOCIAL STABILITY: SOCIAL INSECURITY
WITHIN THE LAST YEAR, HAVE YOU BEEN RAPED OR FORCED TO HAVE ANY KIND OF SEXUAL ACTIVITY BY YOUR PARTNER OR EX-PARTNER?: NO

## 2025-04-23 SDOH — SOCIAL STABILITY: SOCIAL INSECURITY: ARE YOU OR HAVE YOU BEEN THREATENED OR ABUSED PHYSICALLY, EMOTIONALLY, OR SEXUALLY BY ANYONE?: NO

## 2025-04-23 SDOH — ECONOMIC STABILITY: FOOD INSECURITY: WITHIN THE PAST 12 MONTHS, YOU WORRIED THAT YOUR FOOD WOULD RUN OUT BEFORE YOU GOT THE MONEY TO BUY MORE.: NEVER TRUE

## 2025-04-23 SDOH — SOCIAL STABILITY: SOCIAL INSECURITY: DO YOU FEEL UNSAFE GOING BACK TO THE PLACE WHERE YOU ARE LIVING?: NO

## 2025-04-23 SDOH — ECONOMIC STABILITY: INCOME INSECURITY: IN THE PAST 12 MONTHS HAS THE ELECTRIC, GAS, OIL, OR WATER COMPANY THREATENED TO SHUT OFF SERVICES IN YOUR HOME?: NO

## 2025-04-23 SDOH — SOCIAL STABILITY: SOCIAL INSECURITY
WITHIN THE LAST YEAR, HAVE YOU BEEN KICKED, HIT, SLAPPED, OR OTHERWISE PHYSICALLY HURT BY YOUR PARTNER OR EX-PARTNER?: NO

## 2025-04-23 SDOH — ECONOMIC STABILITY: FOOD INSECURITY: WITHIN THE PAST 12 MONTHS, THE FOOD YOU BOUGHT JUST DIDN'T LAST AND YOU DIDN'T HAVE MONEY TO GET MORE.: NEVER TRUE

## 2025-04-23 SDOH — SOCIAL STABILITY: SOCIAL INSECURITY: HAVE YOU HAD THOUGHTS OF HARMING ANYONE ELSE?: NO

## 2025-04-23 SDOH — SOCIAL STABILITY: SOCIAL INSECURITY: HAS ANYONE EVER THREATENED TO HURT YOUR FAMILY OR YOUR PETS?: NO

## 2025-04-23 SDOH — SOCIAL STABILITY: SOCIAL INSECURITY: HAVE YOU HAD ANY THOUGHTS OF HARMING ANYONE ELSE?: NO

## 2025-04-23 SDOH — SOCIAL STABILITY: SOCIAL INSECURITY: DO YOU FEEL ANYONE HAS EXPLOITED OR TAKEN ADVANTAGE OF YOU FINANCIALLY OR OF YOUR PERSONAL PROPERTY?: NO

## 2025-04-23 SDOH — SOCIAL STABILITY: SOCIAL INSECURITY: ARE THERE ANY APPARENT SIGNS OF INJURIES/BEHAVIORS THAT COULD BE RELATED TO ABUSE/NEGLECT?: NO

## 2025-04-23 SDOH — SOCIAL STABILITY: SOCIAL INSECURITY: WERE YOU ABLE TO COMPLETE ALL THE BEHAVIORAL HEALTH SCREENINGS?: YES

## 2025-04-23 SDOH — SOCIAL STABILITY: SOCIAL INSECURITY: ABUSE: ADULT

## 2025-04-23 SDOH — SOCIAL STABILITY: SOCIAL INSECURITY: DOES ANYONE TRY TO KEEP YOU FROM HAVING/CONTACTING OTHER FRIENDS OR DOING THINGS OUTSIDE YOUR HOME?: NO

## 2025-04-23 ASSESSMENT — PAIN SCALES - GENERAL
PAINLEVEL_OUTOF10: 0 - NO PAIN
PAINLEVEL_OUTOF10: 10 - WORST POSSIBLE PAIN
PAINLEVEL_OUTOF10: 0 - NO PAIN

## 2025-04-23 ASSESSMENT — ENCOUNTER SYMPTOMS
FEVER: 0
FACIAL ASYMMETRY: 0
WOUND: 0
EYE DISCHARGE: 0
COLOR CHANGE: 1
UNEXPECTED WEIGHT CHANGE: 0
DIZZINESS: 0
DIFFICULTY URINATING: 0
DIAPHORESIS: 0

## 2025-04-23 ASSESSMENT — ACTIVITIES OF DAILY LIVING (ADL)
HEARING - RIGHT EAR: FUNCTIONAL
GROOMING: INDEPENDENT
FEEDING YOURSELF: INDEPENDENT
WALKS IN HOME: INDEPENDENT
BATHING: INDEPENDENT
HEARING - LEFT EAR: FUNCTIONAL
PATIENT'S MEMORY ADEQUATE TO SAFELY COMPLETE DAILY ACTIVITIES?: YES
DRESSING YOURSELF: INDEPENDENT
ADEQUATE_TO_COMPLETE_ADL: YES
JUDGMENT_ADEQUATE_SAFELY_COMPLETE_DAILY_ACTIVITIES: YES
LACK_OF_TRANSPORTATION: NO
TOILETING: INDEPENDENT

## 2025-04-23 ASSESSMENT — COGNITIVE AND FUNCTIONAL STATUS - GENERAL
MOBILITY SCORE: 24
DAILY ACTIVITIY SCORE: 24
DAILY ACTIVITIY SCORE: 24
MOBILITY SCORE: 24
PATIENT BASELINE BEDBOUND: NO

## 2025-04-23 ASSESSMENT — LIFESTYLE VARIABLES
SKIP TO QUESTIONS 9-10: 1
AUDIT-C TOTAL SCORE: 0
HOW OFTEN DO YOU HAVE A DRINK CONTAINING ALCOHOL: NEVER
HOW MANY STANDARD DRINKS CONTAINING ALCOHOL DO YOU HAVE ON A TYPICAL DAY: PATIENT DOES NOT DRINK
HOW OFTEN DO YOU HAVE 6 OR MORE DRINKS ON ONE OCCASION: NEVER
AUDIT-C TOTAL SCORE: 0

## 2025-04-23 ASSESSMENT — PATIENT HEALTH QUESTIONNAIRE - PHQ9
1. LITTLE INTEREST OR PLEASURE IN DOING THINGS: NOT AT ALL
2. FEELING DOWN, DEPRESSED OR HOPELESS: NOT AT ALL
SUM OF ALL RESPONSES TO PHQ9 QUESTIONS 1 & 2: 0

## 2025-04-23 ASSESSMENT — PAIN - FUNCTIONAL ASSESSMENT: PAIN_FUNCTIONAL_ASSESSMENT: 0-10

## 2025-04-23 NOTE — PROGRESS NOTES
"Pharmacy Medication History Review    Inga Sams is a 34 y.o. female admitted for Cellulitis of right lower extremity. Pharmacy reviewed the patient's [id number] medications and allergies for accuracy.    Medications ADDED:  None  Medications CHANGED:  None  Medications REMOVED:   None     The list below reflects the updated PTA list.   Prior to Admission Medications   Prescriptions Last Dose Informant   acetaminophen (Tylenol) 325 mg tablet Past Week Self   Sig: Take 2 tablets (650 mg) by mouth every 6 hours if needed for mild pain (1 - 3) for up to 5 days.   emtricitabine-tenofovir, TDF, (Truvada) 200-300 mg tablet 4/22/2025 Self   Sig: Take 1 tablet by mouth once daily.   sertraline (Zoloft) 50 mg tablet 4/22/2025 Self   Sig: TAKE 1 TABLET DAILY AS DIRECTED   sulfamethoxazole-trimethoprim (Bactrim DS) 800-160 mg tablet 4/22/2025 Self   Sig: Take 2 tablets by mouth 2 times a day for 7 days.      Facility-Administered Medications: None        The list below reflects the updated allergy list. Please review each documented allergy for additional clarification and justification.  Allergies  Reviewed by Raul Khan PharmD on 4/23/2025   No Known Allergies         Patient accepts M2B at discharge.     [facility]  Pharmacy dispense history  Patient Interview Good historian  Chart Review  Care Everywhere  4/21/2025 [facility] discharge summary    Additional Comments:  No recent dispense history per [facility] report      Raul Khan PharmD  Transitions of Care Pharmacist  04/23/25     Secure Chat preferred   If no response call [phone] or Vocera \"Med Rec\"    "

## 2025-04-23 NOTE — PROGRESS NOTES
Music Therapy Note    Inga Sams was referred by    Therapy Session  Referral Type: New referral this admission  Visit Type: New visit  Session Start Time: 0955  Session End Time: 1009  Intervention Delivery: In-person  Conflict of Service: None  Number of family members present: 1  Family Present for Session: Other (Comment) (Father in law)  Family Participation: Supportive  Number of staff members present: 1     Pre-assessment  Pain Score: 10 - Worst possible pain  Stress Level (0-10): 10         Treatment/Interventions       Post-assessment  0-10 (Numeric) Pain Score: 10 - Worst possible pain  Stress Level (0-10): 5  Continue Visiting: No  Total Session Time (min): 14 minutes    Narrative  Assessment Detail: Patient was referred for a music therapy session to adress pain and stress  Plan: music for relaxation  Intervention: The therapist asked the patient to identify their favorite music styles. The therapist engaged the patient in repetitive rhythmic movement via music live guitar and backing track.    Education Documentation  No documentation found.

## 2025-04-23 NOTE — CARE PLAN
The patient's goals for the shift include      The clinical goals for the shift include patient  will remain HDS during the shift    Over the shift, the patient did not make progress toward the following goals. Barriers to progression include . Recommendations to address these barriers include   Problem: Pain - Adult  Goal: Verbalizes/displays adequate comfort level or baseline comfort level  Outcome: Progressing     Problem: Safety - Adult  Goal: Free from fall injury  Outcome: Progressing     Problem: Discharge Planning  Goal: Discharge to home or other facility with appropriate resources  Outcome: Progressing     Problem: Chronic Conditions and Co-morbidities  Goal: Patient's chronic conditions and co-morbidity symptoms are monitored and maintained or improved  Outcome: Progressing     Problem: Nutrition  Goal: Nutrient intake appropriate for maintaining nutritional needs  Outcome: Progressing     Problem: Infection related to problem list condition  Goal: Infection will resolve through treatment  Outcome: Progressing   .

## 2025-04-23 NOTE — DISCHARGE INSTRUCTIONS
REASON FOR ADMISSION & BRIEF DESCRIPTION OF HOSPITAL STAY:   Dear Ms. Sams,     You presented to [facility] with a chief complaint of right upper thigh rash. We tested chlamydia, gonorrhea, HIV, trichomonas, all of them have been negative. Ultrasound showed a non-occlusive blood clot in the right thigh, and we ordered a different ultrasound to further examine the blood clot, which showed a small blood clot in a superficial vein that is less than 1 cm long. A small clot like this will go away on its own and does not need treatment in the hospital. We would like you to see a blood specialist (Hematologist) as an outpatient to make sure you do not have any blood disorders that put you at higher risk of developing blood clots. We also consulted dermatology, who recommended that you receive two doses of IV vancomycin to cover any additional bacteria that may not be covered by Bactrim. You can keep taking the Bactrim through 4/28/25. Dermatology also recommended a steroid cream for your hands since the skin seemed very irritated.    HOW TO TAKE CARE OF YOURSELF AFTER DISCHARGE:  -Please follow-up with your doctors appointment and take medications as prescribed.     The central scheduling line is [phone] if you do not hear from one of these services or if you need to reschedule.  If you have any worsening symptoms including shortness of breath, chest pain, nausea or vomiting, or extremely high blood sugars please do not hesitate to go to the emergency room.      Medication Changes:   Medications started: Triamcinolone cream twice daily to both hands for irritated skin.  Medications stopped: None      Appointments/Follow-up:  Future Appointments   Date Time Provider Department Center   4/25/2025 12:30 PM Kasey Villa MD [id number] [facility]        - Please follow up with your PCP within 2 weeks for how your skin rash changes after completing the full course of Bactrim. You can also follow up with your PCP for  resolution of the irritated skin on both of your hands.    - You have a referral for Hematology to evaluate for any clotting disorders. They will call you to schedule an appointment.    Sincerely,  Your  Care Team

## 2025-04-23 NOTE — H&P
History Of Present Illness  This is a 34 year old lady not known to have any medical illness, presented to the ED second time due to the increasing erythema on her presumed Rt inguinal cellulitis.     Her Hx started a week ag where she went to urgent care with vaginal discharge for the past couple of days and she was examined and she was managed as a case of acute vaginitis with metro for 7 days, this was on on 4/7/2024 and she was started on Truvada for PrEP. She then presented to [facility] ED 4/19 with symptoms of Rt groin pain, she did spike fever 101.8 °F on presentation to the ED there and she was discharged on Abx, but she did not take them and came [place] [facility] [place], and she did report that when she was discharged she noticed redness on her right medial aspect of her upper thigh where she used a pen to jennifer out the redness. Patient states that the erythema surpassed her markings so she presented to ED. She was admitted to CDU for observation and she underwent CT abdomen that showed 25 x 17 x 22 mm right inguinal adenopathy with moderate surrounding soft tissue stranding with adjacent skin thickening likely secondary to a reactive lymph node and she was started on Oral Bactrim and pain management, its worth to mention that she had leukocytosis at the time of admission to CDU, she was admitted as a case of cellulitis. She was monitored and then she was DC on 4/21 on oral Bactrim. She came in today on 4/23, she never had any fevers after the Bactrim use and she was came in due to the rash going down her thigh and she came in for assessment. ED wanted to admit for observation however no CDU beds. She denied any diarrhea or UTI symptoms and she denied any fever or chills or rigors, she denied any URTI. She denied any changes on her Rt inguinal pain, her only concern was the rash. She denied any allergic symptoms and she denied any SOB or CP or Palpitations, She also denied any rash anywhere else in her body.      Past  Medical History  She has no past medical history on file.    Surgical History  She has a past surgical history that includes ASD repair.     Social History  She reports that she has never smoked. She has never used smokeless tobacco. She reports current alcohol use. She reports that she does not use drugs.    Family History  Family History[1]     Allergies  Patient has no known allergies.    Review of Systems   No history of fever, night sweats or weight loss   No cough, shortness of breath, or contact with sick patient.   No rhinorrhea, or nasal congestion   No chest pain, palpitations, or diaphoresis   No orthopnea, PND, or lower limb edema   No dysuria, or change in urine color or smell or frequency   No abdominal pain, constipation, or nausea/vomiting   No difficulty swallowing or sore throat   No diarrhea, melena, or hematochezia   No dizziness, weakness, LOC, or seizures.   No vision changes, no slurred speech   No rashes, no oral ulcers, no joint pain or swelling   No recent travel, no recent antibiotic use or new medication     Physical Exam  Last Recorded Vitals  /70   Pulse 80   Temp 36.6 °C (97.9 °F)   Resp 18   Wt 68 kg (150 lb)   SpO2 97%   Rt Groin was tender on exam and it was noted for Warm, skin and an area of erythema to the right upper thigh and which has been outlined on the margins, no fluctuance, skin intact with no drainage or bleeding.    Relevant Results  Results for orders placed or performed during the hospital encounter of 04/22/25 (from the past 24 hours)   CBC and Auto Differential   Result Value Ref Range    WBC 11.2 4.4 - 11.3 x10*3/uL    nRBC 0.0 0.0 - 0.0 /100 WBCs    RBC 4.47 4.00 - 5.20 x10*6/uL    Hemoglobin 13.1 12.0 - 16.0 g/dL    Hematocrit 38.2 36.0 - 46.0 %    MCV 86 80 - 100 fL    MCH 29.3 26.0 - 34.0 pg    MCHC 34.3 32.0 - 36.0 g/dL    RDW 11.9 11.5 - 14.5 %    Platelets 312 150 - 450 x10*3/uL    Neutrophils % 72.7 40.0 - 80.0 %    Immature Granulocytes %,  Automated 0.5 0.0 - 0.9 %    Lymphocytes % 18.3 13.0 - 44.0 %    Monocytes % 5.5 2.0 - 10.0 %    Eosinophils % 2.6 0.0 - 6.0 %    Basophils % 0.4 0.0 - 2.0 %    Neutrophils Absolute 8.15 (H) 1.20 - 7.70 x10*3/uL    Immature Granulocytes Absolute, Automated 0.06 0.00 - 0.70 x10*3/uL    Lymphocytes Absolute 2.05 1.20 - 4.80 x10*3/uL    Monocytes Absolute 0.62 0.10 - 1.00 x10*3/uL    Eosinophils Absolute 0.29 0.00 - 0.70 x10*3/uL    Basophils Absolute 0.05 0.00 - 0.10 x10*3/uL   Magnesium   Result Value Ref Range    Magnesium 2.23 1.60 - 2.40 mg/dL   Comprehensive metabolic panel   Result Value Ref Range    Glucose 94 74 - 99 mg/dL    Sodium 136 136 - 145 mmol/L    Potassium 3.6 3.5 - 5.3 mmol/L    Chloride 101 98 - 107 mmol/L    Bicarbonate 23 21 - 32 mmol/L    Anion Gap 16 10 - 20 mmol/L    Urea Nitrogen 13 6 - 23 mg/dL    Creatinine 0.74 0.50 - 1.05 mg/dL    eGFR >90 >60 mL/min/1.73m*2    Calcium 9.7 8.6 - 10.6 mg/dL    Albumin 4.4 3.4 - 5.0 g/dL    Alkaline Phosphatase 114 (H) 33 - 110 U/L    Total Protein 8.8 (H) 6.4 - 8.2 g/dL    AST 22 9 - 39 U/L    Bilirubin, Total 0.3 0.0 - 1.2 mg/dL    ALT 23 7 - 45 U/L   Lactate   Result Value Ref Range    Lactate 1.2 0.4 - 2.0 mmol/L   HIV 1/2 Antigen/Antibody Screen with Reflex to Confirmation   Result Value Ref Range    HIV 1/2 Antigen/Antibody Screen with Reflex to Confirmation Nonreactive Nonreactive   Blood Culture    Specimen: Peripheral Venipuncture; Blood culture   Result Value Ref Range    Blood Culture Loaded on Instrument - Culture in progress    Blood Culture    Specimen: Peripheral Venipuncture; Blood culture   Result Value Ref Range    Blood Culture Loaded on Instrument - Culture in progress    POCT pregnancy, urine   Result Value Ref Range    Preg Test, Ur Negative          Assessment/Plan   This is a 34 years old lady admitted for observation of her MILD SSTI over the Rt inguinal area, witH expanding rash after started Abx, she was actually improving in  terms of labs and her leukocytosis resolved, even though the choice of antibiotics was odd and was targeted at staph cellulitis with no abscess , she did have improved leukocytosis, while the rash did expand, it seems positional, and less likely it's a reaction to Abx. One ddx is LGV due to chlamydia however her NAAT tested prior is negative and the first choice would be doxycycline. However since improvement of her WBC while on Bactrim, I will cont Bactrim and send for CRP PCT and send for STDs work up again with US to follow the LAD in the Rt Groin.       Problem List:   #Mild SSIT of the Rt Groin   #Skin Erythema around the Rt groin.   #Possible STD ( LGV is the top ddx)   Her symptoms were preceded by vaginal discharge, then she developed the rt inguinal pain and rash.   She underwent CT images which are noted above.   She was started on Bactrim and showed Improvement in her WBCs but her rash seems to expanding and that's her main concern.   No documented fever and she did not have any fevers after her Bactrim initiation   HIV negative in the past and NAAT was negative as well.   Plan:   I will Cont Bactrim, considering the improved leukocytosis ( can consider Doxy or Augmentin), I would not change anything pending follow up on erythema and STD work up.   Send for PCT and CRP   US Rt Groin to follow the LAD   Basic labs   Pain management as needed.   Consider DC home in the next 24hr provided that her rash is stable and she had no spikes.   Cont her home meds     Full code     Prasanna Sams (Spouse)  [phone] (Mobile)             Bret Amaya MD         [1] No family history on file.

## 2025-04-23 NOTE — CONSULTS
Reason For Consult  rash    History Of Present Illness  Inga Sams is a 34 y.o. female otherwise healthy presenting with expanding erythema of the R thigh/groin since ~4/20. She had R groin pain, vaginal discharge initially, and groin swelling. She took Bactrim after a second ED visit on 4/20 and has been afebrile since but the rash has been expanding in diameter on her leg. CT showed some inguinal adenopathy. Chlamydia, gonorrhea, HIV, trich testing all negative. No pruritus. Her partner is monogamous, she is monogamous. No outdoor camping or tick exposures for months and months. She has not shaved near the pubic area in weeks or more and it was at least a week go that she shaved her legs last.    She also notes dry, sometimes pruritic painful hands for a long period.     Past Medical History  She has no past medical history on file.    Surgical History  She has a past surgical history that includes ASD repair.     Social History  She reports that she has never smoked. She has never used smokeless tobacco. She reports current alcohol use. She reports that she does not use drugs.    Family History  Family History[1]     Allergies  Patient has no known allergies.    Home Medications  Prior to Admission medications    Medication Sig Start Date End Date Taking? Authorizing Provider   acetaminophen (Tylenol) 325 mg tablet Take 2 tablets (650 mg) by mouth every 6 hours if needed for mild pain (1 - 3) for up to 5 days. 4/21/25 4/26/25  Mendy Alvarado PA-C   emtricitabine-tenofovir, TDF, (Truvada) 200-300 mg tablet Take 1 tablet by mouth once daily. 4/7/25   Kacey Humphrey V, APRN-CNM, APRN-CNP   sertraline (Zoloft) 50 mg tablet TAKE 1 TABLET DAILY AS DIRECTED 6/5/24   Mariam Stover MD   sulfamethoxazole-trimethoprim (Bactrim DS) 800-160 mg tablet Take 2 tablets by mouth 2 times a day for 7 days. 4/21/25 4/28/25  Mendy Alvarado PA-C   bacitracin 500 unit/gram ointment Apply topically 2 times a day. 3/31/25  "4/23/25  Kristin L Schoenlein, APRN-CNP   cephalexin (Keflex) 500 mg capsule Take 1 capsule (500 mg) by mouth 4 times a day for 10 days. 4/19/25 4/23/25  Arik Goddard MD   clotrimazole-betamethasone (Lotrisone) cream Apply 1 Application topically 2 times a day for 28 days. 4/7/25 4/23/25  Kacey Milagro DAVIES, APRN-CNM, APRN-CNP   ibuprofen 600 mg tablet Take 1 tablet (600 mg) by mouth every 6 hours if needed for mild pain (1 - 3) for up to 5 days. 4/21/25 4/23/25  Mendy Alvarado PA-C       Review of Systems  Review of Systems   Constitutional:  Negative for diaphoresis, fever and unexpected weight change.   HENT:  Negative for congestion.    Eyes:  Negative for discharge.   Cardiovascular:  Negative for leg swelling.   Genitourinary:  Negative for decreased urine volume, difficulty urinating and vaginal discharge.   Skin:  Positive for color change. Negative for wound.   Allergic/Immunologic: Negative for immunocompromised state.   Neurological:  Negative for dizziness and facial asymmetry.       Physical Exam  Bilateral lower extremity and groin skin exam performed. Across superior anteromedial R thigh is an inferiorly well-demarcated homogenously erythematous patch that is much warmer to touch than the surrounding and contralateral skin. It ends superiorly before the intercrural fold or genitalia. There is no overlying secondary skin change.  No inguinal adenopathy palpable on either side. The erythematous skin is mildly tender to palpation.  Bilateral hands with erythema, xerosis, some lichenification around the knuckles.     Last Recorded Vitals  Blood pressure (!) 150/110, pulse 83, temperature 36.7 °C (98.1 °F), temperature source Tympanic, resp. rate 17, height 1.575 m (5' 2\"), weight 68 kg (150 lb), last menstrual period 03/01/2025, SpO2 98%, currently breastfeeding.    Relevant Results  4/19/25 CT abd/pelvis w/IV contrast  IMPRESSION:  1.  25 x 17 x 22 mm right inguinal adenopathy, with " moderate  surrounding soft tissue stranding an adjacent skin thickening, likely  secondary to a reactive lymph node. Recommend ultrasound follow-up as  clinically indicated, to document resolution.  2. Punctate right upper pole nonobstructing calculus, without  hydronephrosis.    4/23 R inguinal US  IMPRESSION:  1. Redemonstration of right inguinal lymphadenopathy, as seen on  prior CT abdomen pelvis 04/19/2025, which is likely reactive.  2. Increased echogenicity of the subcutaneous fat, likely  representing associated edema. Further, recommend correlation with  concern for cellulitis.  3. Incidental note of nonocclusive thrombus within the proximal right  greater saphenous vein. Consider dedicated lower extremity Doppler  examination for further assessment in case of any persistent clinical  concern.     Assessment/Plan   Cellulitis of the R thigh  - has been stable since PO Bactrim started ~2-3 days ago  - needs to show improvement w/at least one IV dose of clindamycin vs vancomycin vs other anti-MRSA abx and then can discharge from derm standpoint to finish rest of PO Bactrim outpt  - no palpable cord, less likely a thrombophlebitis  - not raised, less likely erysipelas  - treatment would be ensuring any infection is taken care of for the above two dx anyway + supportive care    Hand atopic dermatitis  - recommend triamcinolone 0.1% cream BID to hands as needed for thickened, rough, or scaly areas; can use to non-face non-groin areas of eczema as needed too  - counseled pt to use a thick cream/ointment emollient all over her skin regularly as well such as a Eucerin product, Aquaphor    Maria Elena Jackson MD  Department of Dermatology  Epic chat preferred    Pt seen and staffed with Dr. Gotti.       [1] No family history on file.     and physical exam or was physically present for key and critical portions performed by the resident. I reviewed the resident's documentation and discussed the patient with the resident. I agree with the resident's medical decision making as documented in the note.    Jaspreet Gotti MD           [1] No family history on file.

## 2025-04-23 NOTE — PROGRESS NOTES
Inga Sams is a 34 y.o. female on day 0 of admission presenting with Cellulitis of right lower extremity.      Subjective   NAEON. Pt noted the rash is still expanding. Denied fever, chills CP, SOB tender or pruritus associated with the rash.        Objective     Last Recorded Vitals  /78   Pulse 98   Temp 36.7 °C (98.1 °F) (Tympanic)   Resp 16   Wt 68 kg (150 lb)   SpO2 96%   Intake/Output last 3 Shifts:  No intake or output data in the 24 hours ending 04/23/25 1425    Admission Weight  Weight: 68 kg (150 lb) (04/22/25 2216)    Daily Weight  04/22/25 : 68 kg (150 lb)    Image Results  US nonvascular extremity US extremity nonvascular real time w image documentation complete  Narrative: Interpreted By:  Danny Sahu and Meyers Emily   STUDY:  US NONVASCULAR EXTREMITY US EXTREMITY NONVASCULAR REAL TIME WITH  IMAGE DOCUMENTATION COMPLETE; ;  4/23/2025 9:33 am      INDICATION:  Signs/Symptoms:Rt inguinal area for assessment of LAD.          COMPARISON:  CT abdomen pelvis 04/19/2025      ACCESSION NUMBER(S):  [id number]      ORDERING CLINICIAN:  MARIMAR ASHFORD      TECHNIQUE:  Limited ultrasound images of the right groin were obtained.      FINDINGS:  Limited sonographic evaluation of the right groin was performed which  demonstrated several enlarged lymph nodes, with a typical fatty  hilum, measuring: 2.1 x 0.8 x 1.0 cm, 2.5 x 1.3 x 1.5 cm, and 1.3 x  0.5 x 0.5 cm.      In addition, there is incidental note of partial thrombus seen within  the proximal right greater saphenous vein.      Further, there is increased echogenicity of the subcutaneous fat,  likely representing associated edema.      Impression: 1. Redemonstration of right inguinal lymphadenopathy, as seen on  prior CT abdomen pelvis 04/19/2025, which is likely reactive.  2. Increased echogenicity of the subcutaneous fat, likely  representing associated edema. Further, recommend correlation with  concern for cellulitis.  3.  Incidental note of nonocclusive thrombus within the proximal right  greater saphenous vein. Consider dedicated lower extremity Doppler  examination for further assessment in case of any persistent clinical  concern.      I personally reviewed the images/study, and I agree with the findings  as stated above by resident physician Dr. Yasmeen Flores MD. This  study was interpreted at [facility], [place], [place].      MACRO:  Critical Finding:  See findings. Notification was initiated on  4/23/2025 at 11:06 am by  Danny Richardson.  (**-YCF-**)  Instructions:      Signed by: Danny Richardson 4/23/2025 11:07 AM  Dictation workstation:   [id number]      Physical Exam    PHYSICAL EXAM:  General: awake, alert, conversant, appears stated age  HEENT: pupils equal and round, no scleral icterus or conjunctivitis  Skin: confluent macular erythematous rash on the R upper thigh, no fluctuance, ulcer, bleeding or drainage. Warm to touch, not tender or pruritic   Chest: ctab, normal respiratory effort, not on supplemental oxygen  Cardiac: regular rate and rhythm, normal s1, s2, no M/R/G, no JVD  Abdomen: soft, ND, NT, no involuntary guarding  EXT: no peripheral edema, no asymmetry noted  MSK: no focal joint swelling noted  Neuro: AOx4, moving all limbs spontaneously, follows commands  Psych: coherent thought process, appropriate mood and affect      Relevant Results    Results for orders placed or performed during the hospital encounter of 04/22/25 (from the past 24 hours)   CBC and Auto Differential   Result Value Ref Range    WBC 11.2 4.4 - 11.3 x10*3/uL    nRBC 0.0 0.0 - 0.0 /100 WBCs    RBC 4.47 4.00 - 5.20 x10*6/uL    Hemoglobin 13.1 12.0 - 16.0 g/dL    Hematocrit 38.2 36.0 - 46.0 %    MCV 86 80 - 100 fL    MCH 29.3 26.0 - 34.0 pg    MCHC 34.3 32.0 - 36.0 g/dL    RDW 11.9 11.5 - 14.5 %    Platelets 312 150 - 450 x10*3/uL    Neutrophils % 72.7 40.0 - 80.0 %    Immature  Granulocytes %, Automated 0.5 0.0 - 0.9 %    Lymphocytes % 18.3 13.0 - 44.0 %    Monocytes % 5.5 2.0 - 10.0 %    Eosinophils % 2.6 0.0 - 6.0 %    Basophils % 0.4 0.0 - 2.0 %    Neutrophils Absolute 8.15 (H) 1.20 - 7.70 x10*3/uL    Immature Granulocytes Absolute, Automated 0.06 0.00 - 0.70 x10*3/uL    Lymphocytes Absolute 2.05 1.20 - 4.80 x10*3/uL    Monocytes Absolute 0.62 0.10 - 1.00 x10*3/uL    Eosinophils Absolute 0.29 0.00 - 0.70 x10*3/uL    Basophils Absolute 0.05 0.00 - 0.10 x10*3/uL   Magnesium   Result Value Ref Range    Magnesium 2.23 1.60 - 2.40 mg/dL   Comprehensive metabolic panel   Result Value Ref Range    Glucose 94 74 - 99 mg/dL    Sodium 136 136 - 145 mmol/L    Potassium 3.6 3.5 - 5.3 mmol/L    Chloride 101 98 - 107 mmol/L    Bicarbonate 23 21 - 32 mmol/L    Anion Gap 16 10 - 20 mmol/L    Urea Nitrogen 13 6 - 23 mg/dL    Creatinine 0.74 0.50 - 1.05 mg/dL    eGFR >90 >60 mL/min/1.73m*2    Calcium 9.7 8.6 - 10.6 mg/dL    Albumin 4.4 3.4 - 5.0 g/dL    Alkaline Phosphatase 114 (H) 33 - 110 U/L    Total Protein 8.8 (H) 6.4 - 8.2 g/dL    AST 22 9 - 39 U/L    Bilirubin, Total 0.3 0.0 - 1.2 mg/dL    ALT 23 7 - 45 U/L   Lactate   Result Value Ref Range    Lactate 1.2 0.4 - 2.0 mmol/L   HIV 1/2 Antigen/Antibody Screen with Reflex to Confirmation   Result Value Ref Range    HIV 1/2 Antigen/Antibody Screen with Reflex to Confirmation Nonreactive Nonreactive   Blood Culture    Specimen: Peripheral Venipuncture; Blood culture   Result Value Ref Range    Blood Culture Loaded on Instrument - Culture in progress    Blood Culture    Specimen: Peripheral Venipuncture; Blood culture   Result Value Ref Range    Blood Culture Loaded on Instrument - Culture in progress    Creatine Kinase   Result Value Ref Range    Creatine Kinase 60 0 - 215 U/L   C-reactive protein   Result Value Ref Range    C-Reactive Protein 9.57 (H) <1.00 mg/dL   POCT pregnancy, urine   Result Value Ref Range    Preg Test, Ur Negative    Urinalysis  with Reflex Culture and Microscopic   Result Value Ref Range    Color, Urine Light-Yellow Light-Yellow, Yellow, Dark-Yellow    Appearance, Urine Clear Clear    Specific Gravity, Urine 1.014 1.005 - 1.035    pH, Urine 6.5 5.0, 5.5, 6.0, 6.5, 7.0, 7.5, 8.0    Protein, Urine NEGATIVE NEGATIVE, 10 (TRACE), 20 (TRACE) mg/dL    Glucose, Urine Normal Normal mg/dL    Blood, Urine NEGATIVE NEGATIVE mg/dL    Ketones, Urine NEGATIVE NEGATIVE mg/dL    Bilirubin, Urine NEGATIVE NEGATIVE mg/dL    Urobilinogen, Urine Normal Normal mg/dL    Nitrite, Urine NEGATIVE NEGATIVE    Leukocyte Esterase, Urine NEGATIVE NEGATIVE   Syphilis Screen with Reflex   Result Value Ref Range    Syphilis Total Ab Nonreactive Nonreactive   Procalcitonin   Result Value Ref Range    Procalcitonin 0.12 (H) <=0.07 ng/mL   Lavender Top   Result Value Ref Range    Extra Tube Hold for add-ons.    Trichomonas Wet Prep Reflex to PCR   Result Value Ref Range    Trichomonas None Seen None Seen    Clue Cells None Seen None Seen    Yeast None Seen None Seen    WBC 3-8     Trichomonas reflex comment       Trichomonas was not seen by wet prep. Reflex Trichomonas vaginalis by Amplified Detection.   C. trachomatis / N. gonorrhoeae, Amplified, Urogenital   Result Value Ref Range    Neisseria gonorrhea,Amplified Negative Negative    Chlamydia trachomatis, Amplified Negative Negative       US nonvascular extremity US extremity nonvascular real time w image documentation complete  Result Date: 4/23/2025  Interpreted By:  Danny Sahu and Meyers Emily STUDY: US NONVASCULAR EXTREMITY US EXTREMITY NONVASCULAR REAL TIME WITH IMAGE DOCUMENTATION COMPLETE; ;  4/23/2025 9:33 am   INDICATION: Signs/Symptoms:Rt inguinal area for assessment of LAD.     COMPARISON: CT abdomen pelvis 04/19/2025   ACCESSION NUMBER(S): [id number]   ORDERING CLINICIAN: MARIMAR ASHFORD   TECHNIQUE: Limited ultrasound images of the right groin were obtained.   FINDINGS: Limited sonographic  evaluation of the right groin was performed which demonstrated several enlarged lymph nodes, with a typical fatty hilum, measuring: 2.1 x 0.8 x 1.0 cm, 2.5 x 1.3 x 1.5 cm, and 1.3 x 0.5 x 0.5 cm.   In addition, there is incidental note of partial thrombus seen within the proximal right greater saphenous vein.   Further, there is increased echogenicity of the subcutaneous fat, likely representing associated edema.       1. Redemonstration of right inguinal lymphadenopathy, as seen on prior CT abdomen pelvis 04/19/2025, which is likely reactive. 2. Increased echogenicity of the subcutaneous fat, likely representing associated edema. Further, recommend correlation with concern for cellulitis. 3. Incidental note of nonocclusive thrombus within the proximal right greater saphenous vein. Consider dedicated lower extremity Doppler examination for further assessment in case of any persistent clinical concern.   I personally reviewed the images/study, and I agree with the findings as stated above by resident physician Dr. Yasmeen Flores MD. This study was interpreted at [place], [place].   MACRO: Critical Finding:  See findings. Notification was initiated on 4/23/2025 at 11:06 am by  Danny Richardson.  (**-YCF-**) Instructions:   Signed by: Danny Richardson 4/23/2025 11:07 AM Dictation workstation:   [id number]          Assessment & Plan  Cellulitis of right lower extremity    This is a 34 years old lady admitted for observation of her erythematous rash over the Rt inguinal area, with expanding rash after started bactrim, nor more fever or leukocytosis  and less likely it's a reaction to Abx. Unclear etiology. Consulted derm and pending derm recs.        #Skin Erythema around the Rt groin.   #Cellulitis  Her symptoms were preceded by vaginal discharge, then she developed the rt inguinal pain and rash.   CT results above   She was started on Bactrim and  showed Improvement in her WBCs but her rash seems to expanding and that's her main concern.   Denied fevers after her Bactrim initiation   HIV, NAAT, syphilis all neg  CRP elevated 9.57  Lactate 1.2  Trichomonas/gonorrhea/chlamydia neg     Plan:   C/w Bactrim   U/S showed nonocclusive thrombus, DVT U/S ordered for further exam  Consulted derm for further recs      F: prn  E: Goal Mg>2, Phos >3, K >4  N: regular diet   A: PIV  DVT ppx: none  GI ppx: none  Bowel care: miralax       Code Status: Full code  NOK:  Prasanna Sams (Spouse)  [phone] (Mobile)

## 2025-04-23 NOTE — HOSPITAL COURSE
This is a 34 years old lady admitted for observation of her erythematous rash over the Rt inguinal area, with expanding rash after started bactrim, nor more fever or leukocytosis  and less likely it's a reaction to Abx.  Syphilis, trichomonas, gonorrhea, chlamydia screen were all negative.  Blood culture has been negative. DVT ultrasound of right lower extremity showed acute nonocclusive thrombosis visualized in the proximal thigh greater saphenous, also multiple nonvascularized lymph nodes in the right groin.  No evidence of acute deep vein thrombosis in the right lower extremity.  However, a referral to hem/onc has been made. Consulted derm, recommended two doses of IV vanc. Pt's symptoms has improved and will be discharged home today.

## 2025-04-23 NOTE — ED TRIAGE NOTES
PT presents to ED via triage fro chief complaint of leg swelling and redness of her right upper leg/groin PT states she was seen here and diagnosed with Lymphatic infection. PT states she was discharged and placed on bactrim but her swelling and redness has gotten worse and is going down her leg. PT is Aox4 and ambulates on her own.

## 2025-04-24 ENCOUNTER — PHARMACY VISIT (OUTPATIENT)
Dept: PHARMACY | Facility: CLINIC | Age: 35
End: 2025-04-24
Payer: COMMERCIAL

## 2025-04-24 VITALS
HEART RATE: 73 BPM | WEIGHT: 150 LBS | DIASTOLIC BLOOD PRESSURE: 72 MMHG | HEIGHT: 62 IN | OXYGEN SATURATION: 97 % | SYSTOLIC BLOOD PRESSURE: 99 MMHG | TEMPERATURE: 97.9 F | BODY MASS INDEX: 27.6 KG/M2 | RESPIRATION RATE: 18 BRPM

## 2025-04-24 LAB
APTT PPP: 36 SECONDS (ref 26–36)
BASOPHILS # BLD AUTO: 0.04 X10*3/UL (ref 0–0.1)
BASOPHILS NFR BLD AUTO: 0.5 %
EOSINOPHIL # BLD AUTO: 0.17 X10*3/UL (ref 0–0.7)
EOSINOPHIL NFR BLD AUTO: 2.1 %
ERYTHROCYTE [DISTWIDTH] IN BLOOD BY AUTOMATED COUNT: 12.1 % (ref 11.5–14.5)
HCT VFR BLD AUTO: 39.1 % (ref 36–46)
HGB BLD-MCNC: 12.6 G/DL (ref 12–16)
IMM GRANULOCYTES # BLD AUTO: 0.08 X10*3/UL (ref 0–0.7)
IMM GRANULOCYTES NFR BLD AUTO: 1 % (ref 0–0.9)
INR PPP: 1.1 (ref 0.9–1.1)
LYMPHOCYTES # BLD AUTO: 1.37 X10*3/UL (ref 1.2–4.8)
LYMPHOCYTES NFR BLD AUTO: 17.1 %
MAGNESIUM SERPL-MCNC: 2.02 MG/DL (ref 1.6–2.4)
MCH RBC QN AUTO: 29.4 PG (ref 26–34)
MCHC RBC AUTO-ENTMCNC: 32.2 G/DL (ref 32–36)
MCV RBC AUTO: 91 FL (ref 80–100)
MONOCYTES # BLD AUTO: 0.4 X10*3/UL (ref 0.1–1)
MONOCYTES NFR BLD AUTO: 5 %
NEUTROPHILS # BLD AUTO: 5.94 X10*3/UL (ref 1.2–7.7)
NEUTROPHILS NFR BLD AUTO: 74.3 %
NRBC BLD-RTO: 0 /100 WBCS (ref 0–0)
PLATELET # BLD AUTO: 320 X10*3/UL (ref 150–450)
PROTHROMBIN TIME: 11.9 SECONDS (ref 9.8–12.4)
RBC # BLD AUTO: 4.29 X10*6/UL (ref 4–5.2)
STAPHYLOCOCCUS SPEC CULT: NORMAL
WBC # BLD AUTO: 8 X10*3/UL (ref 4.4–11.3)

## 2025-04-24 PROCEDURE — 99239 HOSP IP/OBS DSCHRG MGMT >30: CPT

## 2025-04-24 PROCEDURE — 2500000001 HC RX 250 WO HCPCS SELF ADMINISTERED DRUGS (ALT 637 FOR MEDICARE OP)

## 2025-04-24 PROCEDURE — 36415 COLL VENOUS BLD VENIPUNCTURE: CPT

## 2025-04-24 PROCEDURE — 2500000004 HC RX 250 GENERAL PHARMACY W/ HCPCS (ALT 636 FOR OP/ED): Mod: JZ

## 2025-04-24 PROCEDURE — 83735 ASSAY OF MAGNESIUM: CPT

## 2025-04-24 PROCEDURE — 85025 COMPLETE CBC W/AUTO DIFF WBC: CPT

## 2025-04-24 PROCEDURE — 85730 THROMBOPLASTIN TIME PARTIAL: CPT

## 2025-04-24 PROCEDURE — 2500000002 HC RX 250 W HCPCS SELF ADMINISTERED DRUGS (ALT 637 FOR MEDICARE OP, ALT 636 FOR OP/ED)

## 2025-04-24 RX ADMIN — VANCOMYCIN HYDROCHLORIDE 1000 MG: 1 INJECTION, SOLUTION INTRAVENOUS at 05:14

## 2025-04-24 RX ADMIN — SERTRALINE 50 MG: 50 TABLET, FILM COATED ORAL at 09:03

## 2025-04-24 RX ADMIN — TRIAMCINOLONE ACETONIDE: 1 CREAM TOPICAL at 09:04

## 2025-04-24 RX ADMIN — EMTRICITABINE AND TENOFOVIR DISOPROXIL FUMARATE 1 TABLET: 200; 300 TABLET, FILM COATED ORAL at 09:03

## 2025-04-24 ASSESSMENT — COGNITIVE AND FUNCTIONAL STATUS - GENERAL
MOBILITY SCORE: 24
DAILY ACTIVITIY SCORE: 24

## 2025-04-24 ASSESSMENT — ACTIVITIES OF DAILY LIVING (ADL): LACK_OF_TRANSPORTATION: NO

## 2025-04-24 ASSESSMENT — PAIN SCALES - GENERAL: PAINLEVEL_OUTOF10: 0 - NO PAIN

## 2025-04-24 NOTE — NURSING NOTE
4/24/2025 1145 Discharge Note  Patient discharged home with no needs. Discharge instructions discussed with patient, verbalized understanding. Aware of follow up appointments needed. Meds to beds delivered to patient prior to discharge. Peripheral IV removed. Belongings gathered. Patient had a ride home. Refused transport and ambulated off unit at 1128. ----- Charlotte Lennon RN

## 2025-04-24 NOTE — PROGRESS NOTES
04/24/25 0924   Discharge Planning   Living Arrangements Spouse/significant other   Support Systems Spouse/significant other   Assistance Needed Independent with ADL's   Type of Residence Private residence   Do you have animals or pets at home? No   Who is requesting discharge planning? Provider   Home or Post Acute Services None   Expected Discharge Disposition Home   Does the patient need discharge transport arranged? No   Financial Resource Strain   How hard is it for you to pay for the very basics like food, housing, medical care, and heating? Not hard   Housing Stability   In the last 12 months, was there a time when you were not able to pay the mortgage or rent on time? N   At any time in the past 12 months, were you homeless or living in a shelter (including now)? N   Transportation Needs   In the past 12 months, has lack of transportation kept you from medical appointments or from getting medications? no   In the past 12 months, has lack of transportation kept you from meetings, work, or from getting things needed for daily living? No   Patient Choice   Patient / Family choosing to utilize agency / facility established prior to hospitalization No     Assessment Note:  Met with patient and Introduced myself as care coordinator and member of the Care Transitions team for discharge planning. Patient demographics and contact information verified. Pt feels safe at home. Patient have no further questions or concerns at this time.    JOSE FarrellN-RN  Transitional Care Coordinator (TCC)  [phone] ext [phone]

## 2025-04-24 NOTE — CARE PLAN
Problem: Pain - Adult  Goal: Verbalizes/displays adequate comfort level or baseline comfort level  Outcome: Progressing     Problem: Safety - Adult  Goal: Free from fall injury  Outcome: Progressing     Problem: Discharge Planning  Goal: Discharge to home or other facility with appropriate resources  Outcome: Progressing     Problem: Chronic Conditions and Co-morbidities  Goal: Patient's chronic conditions and co-morbidity symptoms are monitored and maintained or improved  Outcome: Progressing     Problem: Nutrition  Goal: Nutrient intake appropriate for maintaining nutritional needs  Outcome: Progressing   The patient's goals for the shift include      The clinical goals for the shift include pt will remain HDS

## 2025-04-24 NOTE — DISCHARGE SUMMARY
Discharge Diagnosis  Cellulitis of right lower extremity     Issues Requiring Follow-Up  - PCP within 2 weeks  - Hem/onc f/up regarding the ultrasound findings [Acute thrombosis in the proximal GSV, Multiple non-vascularized lymphn nodes in right groin]    Discharge Meds     Medication List      START taking these medications     triamcinolone 0.1 % cream; Commonly known as: Kenalog; Apply topically 2   times a day.     CONTINUE taking these medications     acetaminophen 325 mg tablet; Commonly known as: Tylenol; Take 2 tablets   (650 mg) by mouth every 6 hours if needed for mild pain (1 - 3) for up to   5 days.   emtricitabine-tenofovir (TDF) 200-300 mg tablet; Commonly known as:   Truvada; Take 1 tablet by mouth once daily.   sertraline 50 mg tablet; Commonly known as: Zoloft; TAKE 1 TABLET DAILY   AS DIRECTED   sulfamethoxazole-trimethoprim 800-160 mg tablet; Commonly known as:   Bactrim DS; Take 2 tablets by mouth 2 times a day for 7 days.       Test Results Pending At Discharge  Pending Labs       Order Current Status    Coagulation Screen In process    Magnesium In process    Staphylococcus aureus/MRSA colonization, Culture In process    Vaginitis Gram Stain For Bacterial Vaginosis + Yeast In process    Blood Culture Preliminary result    Blood Culture Preliminary result            Hospital Course  This is a 34 years old lady admitted for observation of her erythematous rash over the Rt inguinal area, with expanding rash after started bactrim, nor more fever or leukocytosis  and less likely it's a reaction to Abx.  Syphilis, trichomonas, gonorrhea, chlamydia screen were all negative.  Blood culture has been negative. DVT ultrasound of right lower extremity showed acute nonocclusive thrombosis visualized in the proximal thigh greater saphenous, also multiple nonvascularized lymph nodes in the right groin.  No evidence of acute deep vein thrombosis in the right lower extremity.  However, a referral to hem/onc  has been made. Consulted derm, recommended two doses of IV vanc. Pt's symptoms has improved and will be discharged home today.     Pertinent Physical Exam At Time of Discharge    General: awake, alert, conversant, appears stated age  HEENT: pupils equal and round, no scleral icterus or conjunctivitis  Skin: improved erythematous rash on the R upper thigh, no fluctuance, ulcer, bleeding or drainage. Slightly arm to touch, not tender or pruritic, no palpable lymph nodes  Chest: ctab, normal respiratory effort, not on supplemental oxygen  Cardiac: regular rate and rhythm, normal s1, s2, no M/R/G, no JVD  Abdomen: soft, ND, NT, no involuntary guarding  EXT: no peripheral edema, no asymmetry noted, swelling of upper left arm 2/2 IV infiltration   Neuro: AOx4, moving all limbs spontaneously, follows commands  Psych: coherent thought process, appropriate mood and affect    Outpatient Follow-Up  Future Appointments   Date Time Provider Department Center   4/25/2025 12:30 PM Kasey Villa MD [id number] [facility]   8/1/2025  8:00 AM Reggie Nieto MD PhD [id number] Academic       Neo Miner MD  PGY-1 Neurology

## 2025-04-25 ENCOUNTER — OFFICE VISIT (OUTPATIENT)
Dept: PRIMARY CARE | Facility: CLINIC | Age: 35
End: 2025-04-25
Payer: COMMERCIAL

## 2025-04-25 ENCOUNTER — PATIENT OUTREACH (OUTPATIENT)
Dept: PRIMARY CARE | Facility: CLINIC | Age: 35
End: 2025-04-25

## 2025-04-25 VITALS
HEART RATE: 87 BPM | BODY MASS INDEX: 28.56 KG/M2 | HEIGHT: 62 IN | DIASTOLIC BLOOD PRESSURE: 64 MMHG | OXYGEN SATURATION: 99 % | SYSTOLIC BLOOD PRESSURE: 110 MMHG | WEIGHT: 155.2 LBS | TEMPERATURE: 98.1 F

## 2025-04-25 DIAGNOSIS — L03.115 CELLULITIS OF RIGHT LOWER EXTREMITY: ICD-10-CM

## 2025-04-25 DIAGNOSIS — R59.1 LYMPHADENOPATHY: Primary | ICD-10-CM

## 2025-04-25 PROCEDURE — 3008F BODY MASS INDEX DOCD: CPT | Performed by: FAMILY MEDICINE

## 2025-04-25 PROCEDURE — 99496 TRANSJ CARE MGMT HIGH F2F 7D: CPT | Performed by: FAMILY MEDICINE

## 2025-04-25 ASSESSMENT — ENCOUNTER SYMPTOMS
DEPRESSION: 0
LOSS OF SENSATION IN FEET: 0
OCCASIONAL FEELINGS OF UNSTEADINESS: 0

## 2025-04-25 ASSESSMENT — PATIENT HEALTH QUESTIONNAIRE - PHQ9
2. FEELING DOWN, DEPRESSED OR HOPELESS: NOT AT ALL
SUM OF ALL RESPONSES TO PHQ9 QUESTIONS 1 AND 2: 0
1. LITTLE INTEREST OR PLEASURE IN DOING THINGS: NOT AT ALL

## 2025-04-25 NOTE — ED PROVIDER NOTES
History of Present Illness     History provided by: Patient  Limitations to History: None Identified  External Records Reviewed with Brief Summary: Previous ED visits/recent PCP notes for PMH     HPI:  Inga Sams is a 35 y.o. female who presents for evaluation of right lower extremity and inguinal region infection.  Patient has been seen at the hospital now 3 times for his infection.  She states overall and spite of ongoing outpatient antibiotic she has been having increasing redness in the area.  No fevers or chills last hospital visit.  She initially was seen at [facility] on the 19th where she was febrile covered with broad-spectrum antibiotics treated for UTI and sent home with p.o. antibiotics.  Patient was having worsening pain and redness and with this she returned to the ED main campus where she was CT showing enlarged lymph nodes and inflammatory changes likely secondary to skin infection and cellulitis.  Patient had received Ancef and was observed overnight clinical decision unit and with stability of the redness and patient feeling generally well she was discharged on antibiotics.  She has been taking Bactrim for the last 2 days.  The redness has continued to increase past the margins noted.  She has not any falls or traumas.  The pain in her enlarged lymph nodes is significantly improved and the swelling there has gone down.  She has no rash elsewhere having vomiting diarrhea.  No chest pain or abdominal pain.  Denies chance of pregnancy.    Physical Exam   Triage vitals:  T 36.8 °C (98.3 °F)  HR 93  BP (!) 140/95  RR 16  O2 98 % None (Room air)    General: Awake, alert, in no acute distress  Eyes: Gaze conjugate.  No scleral icterus or injection  HENT: Normo-cephalic, atraumatic. No stridor  CV: RRR. Radial/PT pulses 2+ bilaterally  Resp: Breathing non-labored, speaking in full sentences.  Clear to auscultation bilaterally  GI: Soft, non-distended, non-tender. No rebound or guarding.  :  Deferred  MSK/Extremities: No gross bony deformities. Moving all extremities  Skin: Warm.  Increasing erythema over right inguinal region and right anterior medial thigh see pictures below.  Palpable lymphadenopathy within the right inguinal chain.  Neuro: Alert. Oriented. Face symmetric. Speech is fluent.  Gross strength and sensation intact in b/l UE and LEs  Psych: Appropriate mood and affect            Medical Decision Making & ED Course   Medical Decision Makin y.o. female with no significant past medical history presents for evaluation of worsening redness and swelling in her right inguinal region and thigh.  She is vitally stable upon arrival.  I do of concern for worsening cellulitis.  Less likely related to a localized drug reaction.  Does not appear to be SJS/TEN.  No urticaria.  She is on day 3 3 of Bactrim for course.  No concerns for sepsis.  Did obtain basic labs that shows no leukocytosis anemia electrolyte derangements.  Patient is on Truvada for PrEP and HIV here is negative pregnancy is negative no elevation in lactate.  Patient is not having any urinary symptoms at this time.  Discussed with pharmacy given that patient does not appear to be responding to Bactrim and recommended switching to clindamycin.  Patient was given first dose and will plan to admit for observation.  Patient was updated on results and plan of care and all questions answered.  ----     Social Determinants of Health which Significantly Impact Care: None identified   Independent Result Review and Interpretation: Results were independently reviewed and interpreted by myself. Please see ED course and MDM for full interpretation.    Chronic conditions affecting the patient's care: As documented in the MDM    Care Considerations: As per [facility]    ED Course:  ED Course as of 25 0215      0345 HIV 1/2 Antigen/Antibody Screen with Reflex to Confirmation: Nonreactive [SC]   0419 CBC and Auto Differential(!)  No  leukocytosis anemia or thrombocytopenia [SC]   0421 Comprehensive metabolic panel(!)  No renal hepatic or electrolyte abnormalities [SC]   0616 ED Attending Attestation: this is a 34-year-old female who has failed outpatient antibiotic therapy for right leg infection.  This is her 3rd ED visit.  Has noticed worsening redness to the right thigh.  Bedside ultrasound did confirm lymphadenopathy but no evidence of abscess.  No clinical concern for deeper soft tissue infection.  No crepitus, no systemic sx, no clinical concern for nec fasc. Will admit to medicine given failed antibiotic therapy, with recs for dermatology and/or ID consult in the morning.  - Jim Dc MD, MPH  ED Attending  [KF]      ED Course User Index  [KF] Jim Dc MD MPH  [SC] Angelika Reaves DO         Diagnoses as of 04/25/25 0215   Cellulitis of right lower extremity     Disposition   Adm to medicine    Procedures   Procedures    Patient seen and discussed with ED attending physician.    Angelika Reaves DO  PGY-3 Emergency Medicine     Angelika Reaves DO  Resident  04/25/25 0215

## 2025-04-25 NOTE — PROGRESS NOTES
"Patient: Inga Sams  : 1990  PCP: Kasey Villa MD  MRN: [id number]  Program: Transitional Care Management  Status: Enrolled  Effective Dates: 2025 - present  Responsible Staff: Kirti Cook  Social Drivers to be Addressed: Physical Activity, Social Connections, Stress         Inga Sams is a 35 y.o. female presenting today for follow-up after being discharged from the hospital 1 days ago. The main problem requiring admission was lymphadenopathy and cellulitis. The discharge summary and/or Transitional Care Management documentation was reviewed. Medication reconciliation was performed as indicated via the \"Arya as Reviewed\" timestamp.     Inga Sams was contacted by Transitional Care Management services two days after her discharge. This encounter and supporting documentation was reviewed.    Inga is an otherwise healthy patient with an atypical recent presentation. 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  Treated with OTC monistat but without improvement. Went to UC and had testing which was inconclusive but given Diflucan.  Symptoms did not improve and developed a mild rash in the groin folds, red dots which were nontender.  Went to OB and again had testing that was nonconclusive.  Was given Flagyl without improvement.  Took another Diflucan but about 24 hours after that started to feel unwell. Diaphoretic, lightheaded and significant pain on the right groin fold.  Went to ER and did not feel heard.  Workup showed tachycardia, leukocytosis up to 20,  UA with leuks but pt asymptomatic.  Received one dose of Rocephin.  Discharged home with keflex but was febrile.  Went back to ER next day because developed rash on right inner thigh that started at the groin.  Was kept in observation.  Received additional antibiotics. Discharged home when leukocytosis improved.  Rash " "on the right leg still increasing and spreading therefore returned back and admitted again.  On repeat hospitalization she had further evaluation including ultrasound of swollen lymph node.  This incidentally showed an acute superficial blood clot which was not felt to be related the current presentation. Dermatology was consulted and evaluated her.  Recommendation for IV vanco and discharge with bactrim.  Had ongoing improvement with vancomycin and was discharged on 4/24/25.    Since going home reports that she if feeling better.  Right lymph node seems to be improving. No nausea or lightheadness.  No fevers.    Worried about return or worsening of rash.  Still with lots of questions about why lymph node/infection.  States that they did think of the fact that they have 3 cats who frequently are jumping on her lab, possible scratch.  Possible ingrown hair?, razor?     Review of Systems  Negative unless noted in HPI    /64 (BP Location: Right arm, Patient Position: Sitting, BP Cuff Size: Adult)   Pulse 87   Temp 36.7 °C (98.1 °F) (Temporal)   Ht 1.575 m (5' 2\")   Wt 70.4 kg (155 lb 3.2 oz)   LMP 03/01/2025 (Approximate)   SpO2 99%   BMI 28.39 kg/m²     Physical Exam  Constitutional:       Appearance: Normal appearance.   Cardiovascular:      Rate and Rhythm: Normal rate and regular rhythm.   Pulmonary:      Effort: Pulmonary effort is normal.      Breath sounds: Normal breath sounds.   Lymphadenopathy:      Lower Body: Right inguinal adenopathy (right inner node present but nontender, small) present.   Skin:     Comments: Right inner thigh with previously marked area of erythema, not erythematous currently and mostly appearing as bruise like discoloration.  The groin folds with scant erythematous lesions present around the hair follicles.   Neurological:      Mental Status: She is alert.         The complexity of medical decision making for this patient's transitional care is high.    Assessment/Plan "   Problem List Items Addressed This Visit           ICD-10-CM    Cellulitis of right lower extremity L03.115    Relevant Orders    Bartonella Antibody Panel    Toxoplasma Gondii Antibody, IgM    Lymphadenopathy - Primary R59.1    Complicated recent history reviewed at length  Will check several titers for possible causes of infection  We did discuss option of also following up with ID to evaluate further  Overall improving clinically  Finish antibiotics  Recheck US of lymphadenopathy in about 1 month, reassurance provided that likely reactive and related to infection which could have been related to ingrown hair and subsequent infection  Agree with hematology referral for SVT and lymphadenopathy, will attempt to get referral moved up  Discussed return precautions, advise pt to reach out with any concerns         Relevant Orders    Bartonella Antibody Panel    Toxoplasma Gondii Antibody, IgM

## 2025-04-25 NOTE — PROGRESS NOTES
This patient has a follow up scheduled with PCP within 2 business days of DC on (4/25/25). This visit qualifies for TCM billing.    TCM completed 04/25/25   Discharge Facility: [facility]  Discharge Diagnosis: Cellulitis of right lower extremity  Admission Date: 4/22/25  Discharge Date: 4/24/25     PCP Appointment Date: 4/25/25  Specialist Appointment Date:   Hem/Onc-  8/1/25    Hospital Encounter and Summary Linked: Yes  ED to Hosp-Admission (Discharged) with Pam Cary DO; Jim Dc MD MPH (04/22/2025)           Issues Requiring Follow-Up:  - PCP within 2 weeks  - Hem/onc f/up regarding the ultrasound findings [Acute thrombosis in the proximal GSV, Multiple non-vascularized lymphn nodes in right groin]      Discharge Meds  START taking these medications:     Triamcinolone 0.1 % cream; Commonly known as: Kenalog; Apply topically 2 times a day.

## 2025-04-26 PROBLEM — U07.1 COVID-19 AFFECTING PREGNANCY IN FIRST TRIMESTER (HHS-HCC): Status: RESOLVED | Noted: 2023-10-19 | Resolved: 2025-04-26

## 2025-04-26 PROBLEM — R59.1 LYMPHADENOPATHY: Status: ACTIVE | Noted: 2025-04-26

## 2025-04-26 PROBLEM — O98.511 COVID-19 AFFECTING PREGNANCY IN FIRST TRIMESTER (HHS-HCC): Status: RESOLVED | Noted: 2023-10-19 | Resolved: 2025-04-26

## 2025-04-26 NOTE — ASSESSMENT & PLAN NOTE
Complicated recent history reviewed at length  Will check several titers for possible causes of infection  We did discuss option of also following up with ID to evaluate further  Overall improving clinically  Finish antibiotics  Recheck US of lymphadenopathy in about 1 month, reassurance provided that likely reactive and related to infection which could have been related to ingrown hair and subsequent infection  Agree with hematology referral for SVT and lymphadenopathy, will attempt to get referral moved up  Discussed return precautions, advise pt to reach out with any concerns

## 2025-04-27 LAB
BACTERIA BLD CULT: NORMAL
BACTERIA BLD CULT: NORMAL

## 2025-05-01 DIAGNOSIS — R59.1 LYMPHADENOPATHY: ICD-10-CM

## 2025-05-01 DIAGNOSIS — Z20.6 EXPOSURE TO HIV: Primary | ICD-10-CM

## 2025-05-01 DIAGNOSIS — L03.115 CELLULITIS OF RIGHT LOWER EXTREMITY: ICD-10-CM

## 2025-05-01 DIAGNOSIS — I82.811 ACUTE SUPERFICIAL VENOUS THROMBOSIS OF RIGHT LOWER EXTREMITY: ICD-10-CM

## 2025-05-02 LAB
B HENSELAE IGG SER QL: NEGATIVE
B HENSELAE IGM SER QL: NEGATIVE
T GONDII IGM SERPL QL IA: <8 AU/ML

## 2025-05-15 ENCOUNTER — APPOINTMENT (OUTPATIENT)
Dept: RADIOLOGY | Facility: HOSPITAL | Age: 35
End: 2025-05-15
Payer: COMMERCIAL

## 2025-05-15 ENCOUNTER — PATIENT OUTREACH (OUTPATIENT)
Dept: PRIMARY CARE | Facility: CLINIC | Age: 35
End: 2025-05-15

## 2025-05-15 DIAGNOSIS — R59.1 LYMPHADENOPATHY: ICD-10-CM

## 2025-05-15 PROCEDURE — 76770 US EXAM ABDO BACK WALL COMP: CPT

## 2025-05-19 DIAGNOSIS — R59.0 INGUINAL LYMPHADENOPATHY: Primary | ICD-10-CM

## 2025-05-20 ENCOUNTER — TELEPHONE (OUTPATIENT)
Dept: HEMATOLOGY/ONCOLOGY | Facility: HOSPITAL | Age: 35
End: 2025-05-20
Payer: COMMERCIAL

## 2025-05-20 ENCOUNTER — PATIENT MESSAGE (OUTPATIENT)
Dept: PRIMARY CARE | Facility: CLINIC | Age: 35
End: 2025-05-20
Payer: COMMERCIAL

## 2025-05-20 NOTE — TELEPHONE ENCOUNTER
Pt called regarding [facility]. Per Shayla RUANO NP, pt can either address lymphadenopathy with Hem Onc appointment in June or see Diagnostic Clinic. Pt prefers to see Diagnostic Clinic. Appointment made for 5/27 at 12pm with Shayla RUANO NP.     Jennifer KERNS RN   [facility]

## 2025-05-23 ENCOUNTER — APPOINTMENT (OUTPATIENT)
Dept: PRIMARY CARE | Facility: CLINIC | Age: 35
End: 2025-05-23
Payer: COMMERCIAL

## 2025-05-27 ENCOUNTER — OFFICE VISIT (OUTPATIENT)
Dept: HEMATOLOGY/ONCOLOGY | Facility: HOSPITAL | Age: 35
End: 2025-05-27
Payer: COMMERCIAL

## 2025-05-27 ENCOUNTER — LAB (OUTPATIENT)
Dept: LAB | Facility: HOSPITAL | Age: 35
End: 2025-05-27
Payer: COMMERCIAL

## 2025-05-27 VITALS
TEMPERATURE: 98.2 F | OXYGEN SATURATION: 100 % | BODY MASS INDEX: 28.92 KG/M2 | SYSTOLIC BLOOD PRESSURE: 114 MMHG | DIASTOLIC BLOOD PRESSURE: 74 MMHG | HEART RATE: 88 BPM | RESPIRATION RATE: 15 BRPM | WEIGHT: 158.1 LBS

## 2025-05-27 DIAGNOSIS — R59.0 INGUINAL LYMPHADENOPATHY: ICD-10-CM

## 2025-05-27 LAB
ALBUMIN SERPL BCP-MCNC: 4.6 G/DL (ref 3.4–5)
ALP SERPL-CCNC: 81 U/L (ref 33–110)
ALT SERPL W P-5'-P-CCNC: 14 U/L (ref 7–45)
ANION GAP SERPL CALC-SCNC: 14 MMOL/L (ref 10–20)
APTT PPP: 36 SECONDS (ref 26–36)
AST SERPL W P-5'-P-CCNC: 20 U/L (ref 9–39)
BASOPHILS # BLD AUTO: 0.05 X10*3/UL (ref 0–0.1)
BASOPHILS NFR BLD AUTO: 0.8 %
BILIRUB SERPL-MCNC: 0.4 MG/DL (ref 0–1.2)
BUN SERPL-MCNC: 15 MG/DL (ref 6–23)
CALCIUM SERPL-MCNC: 9.6 MG/DL (ref 8.6–10.3)
CHLORIDE SERPL-SCNC: 106 MMOL/L (ref 98–107)
CO2 SERPL-SCNC: 24 MMOL/L (ref 21–32)
CREAT SERPL-MCNC: 0.61 MG/DL (ref 0.5–1.05)
CRP SERPL-MCNC: 0.16 MG/DL
EGFRCR SERPLBLD CKD-EPI 2021: >90 ML/MIN/1.73M*2
EOSINOPHIL # BLD AUTO: 0.07 X10*3/UL (ref 0–0.7)
EOSINOPHIL NFR BLD AUTO: 1.1 %
ERYTHROCYTE [DISTWIDTH] IN BLOOD BY AUTOMATED COUNT: 12.4 % (ref 11.5–14.5)
ERYTHROCYTE [SEDIMENTATION RATE] IN BLOOD BY WESTERGREN METHOD: 15 MM/H (ref 0–20)
GLUCOSE SERPL-MCNC: 118 MG/DL (ref 74–99)
HBV CORE AB SER QL: NONREACTIVE
HBV SURFACE AG SERPL QL IA: NONREACTIVE
HCT VFR BLD AUTO: 40 % (ref 36–46)
HGB BLD-MCNC: 13.4 G/DL (ref 12–16)
HIV 1+2 AB+HIV1 P24 AG SERPL QL IA: NONREACTIVE
IMM GRANULOCYTES # BLD AUTO: 0.02 X10*3/UL (ref 0–0.7)
IMM GRANULOCYTES NFR BLD AUTO: 0.3 % (ref 0–0.9)
INR PPP: 1.2 (ref 0.9–1.1)
LDH SERPL L TO P-CCNC: 150 U/L (ref 84–246)
LYMPHOCYTES # BLD AUTO: 1.67 X10*3/UL (ref 1.2–4.8)
LYMPHOCYTES NFR BLD AUTO: 25.2 %
MCH RBC QN AUTO: 30.3 PG (ref 26–34)
MCHC RBC AUTO-ENTMCNC: 33.5 G/DL (ref 32–36)
MCV RBC AUTO: 91 FL (ref 80–100)
MONOCYTES # BLD AUTO: 0.32 X10*3/UL (ref 0.1–1)
MONOCYTES NFR BLD AUTO: 4.8 %
NEUTROPHILS # BLD AUTO: 4.5 X10*3/UL (ref 1.2–7.7)
NEUTROPHILS NFR BLD AUTO: 67.8 %
NRBC BLD-RTO: 0 /100 WBCS (ref 0–0)
PLATELET # BLD AUTO: 290 X10*3/UL (ref 150–450)
POTASSIUM SERPL-SCNC: 3.8 MMOL/L (ref 3.5–5.3)
PROT SERPL-MCNC: 8.1 G/DL (ref 6.4–8.2)
PROTHROMBIN TIME: 13.3 SECONDS (ref 9.8–12.4)
RBC # BLD AUTO: 4.42 X10*6/UL (ref 4–5.2)
SODIUM SERPL-SCNC: 140 MMOL/L (ref 136–145)
URATE SERPL-MCNC: 3.6 MG/DL (ref 2.3–6.7)
WBC # BLD AUTO: 6.6 X10*3/UL (ref 4.4–11.3)

## 2025-05-27 PROCEDURE — 83615 LACTATE (LD) (LDH) ENZYME: CPT

## 2025-05-27 PROCEDURE — 86611 BARTONELLA ANTIBODY: CPT

## 2025-05-27 PROCEDURE — 86140 C-REACTIVE PROTEIN: CPT

## 2025-05-27 PROCEDURE — 99204 OFFICE O/P NEW MOD 45 MIN: CPT | Performed by: NURSE PRACTITIONER

## 2025-05-27 PROCEDURE — 85025 COMPLETE CBC W/AUTO DIFF WBC: CPT

## 2025-05-27 PROCEDURE — 36415 COLL VENOUS BLD VENIPUNCTURE: CPT

## 2025-05-27 PROCEDURE — 87340 HEPATITIS B SURFACE AG IA: CPT

## 2025-05-27 PROCEDURE — 87799 DETECT AGENT NOS DNA QUANT: CPT

## 2025-05-27 PROCEDURE — 86698 HISTOPLASMA ANTIBODY: CPT

## 2025-05-27 PROCEDURE — 85652 RBC SED RATE AUTOMATED: CPT

## 2025-05-27 PROCEDURE — 80053 COMPREHEN METABOLIC PANEL: CPT

## 2025-05-27 PROCEDURE — 84550 ASSAY OF BLOOD/URIC ACID: CPT

## 2025-05-27 PROCEDURE — 85610 PROTHROMBIN TIME: CPT

## 2025-05-27 PROCEDURE — 87389 HIV-1 AG W/HIV-1&-2 AB AG IA: CPT

## 2025-05-27 PROCEDURE — 86704 HEP B CORE ANTIBODY TOTAL: CPT

## 2025-05-27 PROCEDURE — 86235 NUCLEAR ANTIGEN ANTIBODY: CPT

## 2025-05-27 PROCEDURE — 86038 ANTINUCLEAR ANTIBODIES: CPT

## 2025-05-27 PROCEDURE — 99214 OFFICE O/P EST MOD 30 MIN: CPT | Mod: 25 | Performed by: NURSE PRACTITIONER

## 2025-05-27 ASSESSMENT — ENCOUNTER SYMPTOMS
DEPRESSION: 0
LOSS OF SENSATION IN FEET: 0
OCCASIONAL FEELINGS OF UNSTEADINESS: 0

## 2025-05-27 ASSESSMENT — PAIN SCALES - GENERAL: PAINLEVEL_OUTOF10: 0-NO PAIN

## 2025-05-27 NOTE — PROGRESS NOTES
[facility]  Diagnostic Clinic  New Patient Visit    Date of Service: 25      Patient Name: Inga Sams   : 1990  MRN: [id number]   PCP: Kasey Villa MD   Referred by: Dr. Kasey Villa    Problem: Right inguinal lymphadenopathy seen on 25 CT scan of the abdomen/pelvis and a follow-up 5/15/25 lymph node US. Recently discharged from the hospital on 25 for right leg cellulitis. Found to have nonocclusive thrombus in RLE on 25 vascular US.     HPI: Inga Sams is a 35 y.o. year old female who presents to [facility] with lymphadenopathy, referral placed by Dr. Villa on 25.     Denies B symptoms, specifically denies fevers/chills, night sweats, unintentional weight loss, fatigue, malaise, and CABRERA. Able to run this morning without difficulty.     Currently on Eliquis, Zoloft, and Truvada (HIV prevention -  HIV positive)    History of tobacco use:   Drinks ETOH occasionally. Denies tobacco and illicit drug use.    Personal history of malignancy:   None    Family history of malignancy:   Mom breast cancer mid-50's. Still living.   Father and siblings in good health.    Health Maintenance:  Up-to-date    PATHOLOGY:  N/A     IMAGING:    === 25 ===    CT ABDOMEN PELVIS W IV CONTRAST    - Impression -  1.  25 x 17 x 22 mm right inguinal adenopathy, with moderate  surrounding soft tissue stranding an adjacent skin thickening, likely  secondary to a reactive lymph node. Recommend ultrasound follow-up as  clinically indicated, to document resolution.  2. Punctate right upper pole nonobstructing calculus, without  hydronephrosis.      Signed by: Pravin Steve 2025 11:09 AM  Dictation workstation:   [id number]  === 25 ===    XR CHEST 2 VIEWS    - Impression -  No acute cardiopulmonary disease.  Signed by Herson Zazueta MD    LABS:  CBC w/ diff on 25 WNL, all other labs pending    PAST MEDICAL HISTORY:  Non-Occlusive Thrombus in RLE  25  , youngest child born 24  HIV exposure starting in  (on Truvada)    PAST SURGICAL HISTORY:  Surgical History[1]    SOCIAL HISTORY:   with 3 Children    MEDICATIONS:  Medications Ordered Prior to Encounter[2]    OBJECTIVE:  /74 (BP Location: Left arm, Patient Position: Sitting, BP Cuff Size: Adult)   Pulse 88   Temp 36.8 °C (98.2 °F) (Skin)   Resp 15   Wt 71.7 kg (158 lb 1.6 oz)   SpO2 100%   BMI 28.92 kg/m²     Physical Exam  Constitutional:       Appearance: Normal appearance.   HENT:      Head: Normocephalic.      Mouth/Throat:      Mouth: Mucous membranes are moist.      Pharynx: Oropharynx is clear. No oropharyngeal exudate.   Eyes:      Pupils: Pupils are equal, round, and reactive to light.   Cardiovascular:      Rate and Rhythm: Normal rate and regular rhythm.      Pulses: Normal pulses.      Heart sounds: Normal heart sounds.   Pulmonary:      Effort: Pulmonary effort is normal.      Breath sounds: Normal breath sounds.   Abdominal:      General: Bowel sounds are normal.      Palpations: Abdomen is soft.   Musculoskeletal:         General: Normal range of motion.      Cervical back: Normal range of motion and neck supple.      Right lower leg: No edema.      Left lower leg: No edema.   Lymphadenopathy:      Comments: No lymphadenopathy   Skin:     General: Skin is warm and dry.      Findings: No lesion or rash.   Neurological:      General: No focal deficit present.      Mental Status: She is alert and oriented to person, place, and time. Mental status is at baseline.   Psychiatric:         Mood and Affect: Mood normal.          ASSESSMENT:  34yo female with recent history of cellulitis and RLE non-occlusive thrombus and right inguinal lymphadenopathy.    PLAN:  -- CBC w/ diff, infectious, autoimmune, and inflammatory labs ordered.  -- Lymphadenopathy possibly reactive based on recent infection and thrombus. Will repeat CT scan of the abdomen/pelvis to further  evaluate before determining if biopsy is necessary.  -- Follow-up pending results of above; Diagnostic Clinic to follow results & contact patient for review.  -- All patient and family questions answered.    SARAH Ann.CNP  [facility]                 [1]   Past Surgical History:  Procedure Laterality Date    ASD REPAIR     [2]   Current Outpatient Medications on File Prior to Visit   Medication Sig Dispense Refill    apixaban (Eliquis) 5 mg tablet Take 1 tablet (5 mg) by mouth 2 times a day. 90 tablet 0    emtricitabine-tenofovir, TDF, (Truvada) 200-300 mg tablet Take 1 tablet by mouth once daily. 90 tablet 0    sertraline (Zoloft) 50 mg tablet TAKE 1 TABLET DAILY AS DIRECTED 90 tablet 3    [] triamcinolone (Kenalog) 0.1 % cream Apply topically 2 times a day. 80 g 0     No current facility-administered medications on file prior to visit.

## 2025-05-28 ENCOUNTER — HOSPITAL ENCOUNTER (OUTPATIENT)
Dept: RADIOLOGY | Facility: CLINIC | Age: 35
Discharge: HOME | End: 2025-05-28
Payer: COMMERCIAL

## 2025-05-28 ENCOUNTER — TELEPHONE (OUTPATIENT)
Dept: HEMATOLOGY/ONCOLOGY | Facility: HOSPITAL | Age: 35
End: 2025-05-28
Payer: COMMERCIAL

## 2025-05-28 DIAGNOSIS — R59.0 INGUINAL LYMPHADENOPATHY: ICD-10-CM

## 2025-05-28 LAB
ANA PATTERN: ABNORMAL
ANA SER QL HEP2 SUBST: POSITIVE
ANA TITR SER IF: ABNORMAL {TITER}
CENTROMERE B AB SER-ACNC: <0.2 AI
CHROMATIN AB SERPL-ACNC: <0.2 AI
DSDNA AB SER-ACNC: <1 IU/ML
EBV DNA SPEC NAA+PROBE-LOG#: NORMAL {LOG_COPIES}/ML
ENA JO1 AB SER QL IA: <0.2 AI
ENA RNP AB SER IA-ACNC: 0.5 AI
ENA SCL70 AB SER QL IA: <0.2 AI
ENA SM AB SER IA-ACNC: <0.2 AI
ENA SM+RNP AB SER QL IA: <0.2 AI
ENA SS-A AB SER IA-ACNC: <0.2 AI
ENA SS-B AB SER IA-ACNC: <0.2 AI
LABORATORY COMMENT REPORT: NOT DETECTED
RIBOSOMAL P AB SER-ACNC: <0.2 AI

## 2025-05-28 PROCEDURE — 74177 CT ABD & PELVIS W/CONTRAST: CPT

## 2025-05-28 PROCEDURE — 2550000001 HC RX 255 CONTRASTS: Mod: JZ | Performed by: NURSE PRACTITIONER

## 2025-05-28 RX ADMIN — IOHEXOL 75 ML: 350 INJECTION, SOLUTION INTRAVENOUS at 12:12

## 2025-05-28 NOTE — TELEPHONE ENCOUNTER
Mrs. Sams was seen in our diagnostic clinic yesterday, 5/27/25 for right inguinal lymphadenopathy. Additional lab work was completed yesterday and repeat CT abd/pelvis today. The CT scan showed near resolution of her lymphadenopathy and so far all blood work is unremarkable. REBECCA and some additional testing for infections is pending, but more than likely her lymphadenopathy was reactive due to her recent cellulitis and thrombus. I spoke to Mrs. Sams today to let her know of the results and we will follow-up with her by phone once all blood work is back.    Shayla Marroquin APRN-CNP  [facility]

## 2025-05-29 ENCOUNTER — PATIENT OUTREACH (OUTPATIENT)
Dept: PRIMARY CARE | Facility: CLINIC | Age: 35
End: 2025-05-29
Payer: COMMERCIAL

## 2025-05-29 LAB
B HENSELAE IGG TITR SER IF: NORMAL {TITER}
B HENSELAE IGM TITR SER IF: NORMAL {TITER}

## 2025-05-31 LAB
H CAPSUL AB SER QL ID: NOT DETECTED
H CAPSUL MYC AB TITR SER CF: NORMAL {TITER}
H CAPSUL YST AB TITR SER CF: NORMAL {TITER}

## 2025-06-05 ENCOUNTER — OFFICE VISIT (OUTPATIENT)
Dept: HEMATOLOGY/ONCOLOGY | Facility: CLINIC | Age: 35
End: 2025-06-05
Payer: COMMERCIAL

## 2025-06-05 VITALS
HEART RATE: 89 BPM | WEIGHT: 159.72 LBS | TEMPERATURE: 98.2 F | OXYGEN SATURATION: 95 % | BODY MASS INDEX: 28.3 KG/M2 | RESPIRATION RATE: 18 BRPM | HEIGHT: 63 IN | SYSTOLIC BLOOD PRESSURE: 115 MMHG | DIASTOLIC BLOOD PRESSURE: 78 MMHG

## 2025-06-05 DIAGNOSIS — I82.811 SUPERFICIAL THROMBOSIS OF RIGHT LOWER EXTREMITY: ICD-10-CM

## 2025-06-05 PROCEDURE — 99215 OFFICE O/P EST HI 40 MIN: CPT | Performed by: INTERNAL MEDICINE

## 2025-06-05 PROCEDURE — 3008F BODY MASS INDEX DOCD: CPT | Performed by: INTERNAL MEDICINE

## 2025-06-05 ASSESSMENT — PAIN SCALES - GENERAL: PAINLEVEL_OUTOF10: 0-NO PAIN

## 2025-06-05 NOTE — PROGRESS NOTES
Patient ID: Inga Sams is a 35 y.o. female.  [place]  [id number]  Department [place] [address]  Primary Care Provider: Kasey Villa MD  Referral Reason: nonocclusive thrombus in RLE     Subjective:      Heme/Onc History:  Inga Sams is a 35 y.o. year old female who presents to [facility] with lymphadenopathy, referral placed by Dr. Villa on 5/19/25.      Denies B symptoms, specifically denies fevers/chills, night sweats, unintentional weight loss, fatigue, malaise, and CABRERA. Able to run this morning without difficulty.      Currently on Eliquis, Zoloft, and Truvada (HIV prevention -  HIV positive)    Right inguinal lymphadenopathy seen on 4/19/25 CT scan of the abdomen/pelvis and a follow-up 5/15/25 lymph node US. Recently discharged from the hospital on 4/24/25 for right leg cellulitis. Found to have nonocclusive thrombus in RLE on 4/23/25 vascular US.     Duplex CONCLUSIONS:  Right Lower Venous: No evidence of acute deep vein thrombus visualized in the right lower extremity. Cannot rule out thrombus in non-visualized iliac vein due to too painful to compress. There is acute non-occlusive thrombosis visualized in the proximal thigh great saphenous. Acute thrombosis in the proximal GSV thigh is 7.71 mm from CFV. Additional Findings; Lymph nodes Multiple non-vascularized lymphn nodes in right groin.  1. measures 17.67 mm x 15.93 mm.  2. measures 10.33 mm x 18.46 mm.     Comparison:  Compared with study from 4/23/2025, no significant change.Seen in point of care ultrasound done bedside.    Past Medical History: Medical History[1]  Social History:   Social History     Socioeconomic History    Marital status:      Spouse name: Not on file    Number of children: Not on file    Years of education: Not on file    Highest education level: Not on file   Occupational History    Not on file   Tobacco Use    Smoking status:  Never    Smokeless tobacco: Never   Vaping Use    Vaping status: Never Used   Substance and Sexual Activity    Alcohol use: Yes     Comment: occasional    Drug use: Never    Sexual activity: Not on file   Other Topics Concern    Not on file   Social History Narrative    Not on file     Social Drivers of Health     Financial Resource Strain: Low Risk  (4/24/2025)    Overall Financial Resource Strain (CARDIA)     Difficulty of Paying Living Expenses: Not hard at all   Food Insecurity: No Food Insecurity (4/23/2025)    Hunger Vital Sign     Worried About Running Out of Food in the Last Year: Never true     Ran Out of Food in the Last Year: Never true   Transportation Needs: No Transportation Needs (4/24/2025)    PRAPARE - Transportation     Lack of Transportation (Medical): No     Lack of Transportation (Non-Medical): No   Physical Activity: Inactive (1/22/2021)    Received from [facility]    Exercise Vital Sign     Days of Exercise per Week: 0 days     Minutes of Exercise per Session: 0 min   Stress: No Stress Concern Present (1/22/2021)    Received from [facility]    [employer] [place] [employer] - Occupational Stress Questionnaire     Feeling of Stress : Only a little   Social Connections: Moderately Isolated (1/22/2021)    Received from [facility]    Social Connection and Isolation Panel [NHANES]     Frequency of Communication with Friends and Family: More than three times a week     Frequency of Social Gatherings with Friends and Family: More than three times a week     Attends Amish Services: Never     Active Member of Clubs or Organizations: No     Attends Club or Organization Meetings: Never     Marital Status:    Intimate Partner Violence: Not At Risk (4/23/2025)    Humiliation, Afraid, Rape, and Kick questionnaire     Fear of Current or Ex-Partner: No     Emotionally Abused: No     Physically Abused: No     Sexually Abused: No   Housing Stability: Low Risk  (4/24/2025)  "   Housing Stability Vital Sign     Unable to Pay for Housing in the Last Year: No     Number of Times Moved in the Last Year: 1     Homeless in the Last Year: No     Surgical History: Surgical History[2]  Family History: Family History[3]   reports that she has never smoked. She has never used smokeless tobacco.  Oncology Family history: Cancer-related family history is not on file.    Review Of Systems:  As stated per in HPI; otherwise all other 12 point ROS are negative    Physical Exam:  /78 (BP Location: Left arm, Patient Position: Sitting, BP Cuff Size: Adult long)   Pulse 89   Temp 36.8 °C (98.2 °F) (Temporal)   Resp 18   Ht (S) 1.595 m (5' 2.8\")   Wt 72.4 kg (159 lb 11.6 oz)   SpO2 95%   BMI 28.48 kg/m²   BSA: 1.79 meters squared  General: awake/alert/oriented x3, no distress, alert and cooperative  Head: Short hair fully covering scalp. Symmetric facial expressions  Eyes: PERRL, EOMI, clear sclera, eyebrows present.  Ears/Nose/Mouth/Throat:  Oral mucous membranes moist. No oral ulcers. No palpable pre/post-auricular lymph nodes  Neck: No palpable cervical chain lymph nodes  Respiratory: unlabored breathing on room air, good chest expansion, thorax symmetric  Cardio: Regular rate and rhythm, normal S1 and S2, radial pulses symmetric  GI: Nondistended, soft, non-tender abdomen  Musculoskeletal: Normal muscle bulk and tone, ROM intact, no joint swelling.  Rises from chair and walks unassisted.  Extremities: No ankle swelling, no arm or leg wounds  Neuro: Alert, cognition intact, speech normal. Facial expressions symmetric.  No motor deficits noted. Sensation intact to touch and hot/cold.   Able to stand from seated position unassisted and walks around the room unassisted.  Psychological: Appropriate mood and behavior.  Skin: Warm and dry, no lesions, no rashes    Results:  Diagnostic Results   Lab Results   Component Value Date    WBC 6.6 05/27/2025    HGB 13.4 05/27/2025    HCT 40.0 05/27/2025    " MCV 91 05/27/2025     05/27/2025     Lab Results   Component Value Date    CALCIUM 9.6 05/27/2025     05/27/2025    K 3.8 05/27/2025    CO2 24 05/27/2025     05/27/2025    BUN 15 05/27/2025    CREATININE 0.61 05/27/2025    ALT 14 05/27/2025    AST 20 05/27/2025     Current Medications[4]     Assessment/Plan:  ? SVT:    Provoked by cellulitis. She has been on Eliquis 5 mgpo BID for the last 5 weeks. We will repeat Duplex to confirm resolution of SVT and then she can stop. No need for thrombophilia testing.    Telemed in 1 week    Diagnoses and all orders for this visit:  Superficial thrombosis of right lower extremity  -     Referral To Hematology and Oncology       Performance Status: Asymptomatic    I spent more than 60 minutes for the patient today, including face-to-face conversation, pre-visit preparation, post-visit orders, and others.   Edith Juarez MD                                [1] No past medical history on file.  [2]   Past Surgical History:  Procedure Laterality Date    ASD REPAIR     [3] No family history on file.  [4]   Current Outpatient Medications:     apixaban (Eliquis) 5 mg tablet, Take 1 tablet (5 mg) by mouth 2 times a day., Disp: 90 tablet, Rfl: 0    emtricitabine-tenofovir, TDF, (Truvada) 200-300 mg tablet, Take 1 tablet by mouth once daily., Disp: 90 tablet, Rfl: 0    sertraline (Zoloft) 50 mg tablet, TAKE 1 TABLET DAILY AS DIRECTED, Disp: 90 tablet, Rfl: 0

## 2025-06-05 NOTE — PROGRESS NOTES
Pt seen in office today for anew patient  visit with Dr. Edith Juarez for management of a RLE DVT and right inguinal lymphadenopathy.  She is without complaints today and denies pain.     Medications, pharmacy preference and allergies were reviewed with patient and updated in the medical record by MD.     Per orders, a DT a/p was completed on 5/28/25 and results are to be reviewed in office today by MD with patient. No labs have been ordered prior to this visit.  She is to have a RLE duplex scan this week or next and will have a virtual visit next week to discuss the results.  Our contact information was given to patient and they were encouraged to contact us with any questions or concerns.    Patient verbalized understanding and agreement regarding discussed information via verbal feedback. Pt escorted to scheduling.

## 2025-06-10 ENCOUNTER — HOSPITAL ENCOUNTER (OUTPATIENT)
Dept: VASCULAR MEDICINE | Facility: HOSPITAL | Age: 35
Discharge: HOME | End: 2025-06-10
Payer: COMMERCIAL

## 2025-06-10 DIAGNOSIS — I82.811 SUPERFICIAL THROMBOSIS OF RIGHT LOWER EXTREMITY: ICD-10-CM

## 2025-06-10 PROCEDURE — 93971 EXTREMITY STUDY: CPT | Performed by: INTERNAL MEDICINE

## 2025-06-10 PROCEDURE — 93971 EXTREMITY STUDY: CPT

## 2025-06-12 ENCOUNTER — TELEMEDICINE (OUTPATIENT)
Dept: HEMATOLOGY/ONCOLOGY | Facility: CLINIC | Age: 35
End: 2025-06-12
Payer: COMMERCIAL

## 2025-06-12 DIAGNOSIS — I82.811 SUPERFICIAL THROMBOSIS OF RIGHT LOWER EXTREMITY: ICD-10-CM

## 2025-06-12 PROCEDURE — 99214 OFFICE O/P EST MOD 30 MIN: CPT | Performed by: INTERNAL MEDICINE

## 2025-06-12 NOTE — PROGRESS NOTES
Patient ID: Inga Sams is a 35 y.o. female.  Referring Physician: Edith Juarez MD  [address] [place] [address]  [facility] [place] [address]  Primary Care Provider: Kasey Villa MD  Referral Reason: nonocclusive thrombus in RLE     Subjective:      Heme/Onc History:  Inga Sams is a 35 y.o. year old female who presents to [facility] with lymphadenopathy, referral placed by Dr. Villa on 5/19/25.      Denies B symptoms, specifically denies fevers/chills, night sweats, unintentional weight loss, fatigue, malaise, and CABRERA. Able to run this morning without difficulty.      Currently on Eliquis, Zoloft, and Truvada (HIV prevention -  HIV positive)    Right inguinal lymphadenopathy seen on 4/19/25 CT scan of the abdomen/pelvis and a follow-up 5/15/25 lymph node US. Recently discharged from the hospital on 4/24/25 for right leg cellulitis. Found to have nonocclusive thrombus in RLE on 4/23/25 vascular US.     Duplex CONCLUSIONS:  Right Lower Venous: No evidence of acute deep vein thrombus visualized in the right lower extremity. Cannot rule out thrombus in non-visualized iliac vein due to too painful to compress. There is acute non-occlusive thrombosis visualized in the proximal thigh great saphenous. Acute thrombosis in the proximal GSV thigh is 7.71 mm from CFV. Additional Findings; Lymph nodes Multiple non-vascularized lymphn nodes in right groin.  1. measures 17.67 mm x 15.93 mm.  2. measures 10.33 mm x 18.46 mm.     Comparison:  Compared with study from 4/23/2025, no significant change.Seen in point of care ultrasound done bedside.    Past Medical History: Medical History[1]  Social History:   Social History     Socioeconomic History    Marital status:      Spouse name: Not on file    Number of children: Not on file    Years of education: Not on file    Highest education level: Not on file   Occupational History    Not on file   Tobacco Use    Smoking status: Never    Smokeless tobacco: Never    Vaping Use    Vaping status: Never Used   Substance and Sexual Activity    Alcohol use: Yes     Comment: occasional    Drug use: Never    Sexual activity: Not on file   Other Topics Concern    Not on file   Social History Narrative    Not on file     Social Drivers of Health     Financial Resource Strain: Low Risk  (4/24/2025)    Overall Financial Resource Strain (CARDIA)     Difficulty of Paying Living Expenses: Not hard at all   Food Insecurity: No Food Insecurity (4/23/2025)    Hunger Vital Sign     Worried About Running Out of Food in the Last Year: Never true     Ran Out of Food in the Last Year: Never true   Transportation Needs: No Transportation Needs (4/24/2025)    PRAPARE - Transportation     Lack of Transportation (Medical): No     Lack of Transportation (Non-Medical): No   Physical Activity: Inactive (1/22/2021)    Received from [facility]    Exercise Vital Sign     Days of Exercise per Week: 0 days     Minutes of Exercise per Session: 0 min   Stress: No Stress Concern Present (1/22/2021)    Received from [facility]    [place] [place] [employer] - Occupational Stress Questionnaire     Feeling of Stress : Only a little   Social Connections: Moderately Isolated (1/22/2021)    Received from [facility]    Social Connection and Isolation Panel [NHANES]     Frequency of Communication with Friends and Family: More than three times a week     Frequency of Social Gatherings with Friends and Family: More than three times a week     Attends Nondenominational Services: Never     Active Member of Clubs or Organizations: No     Attends Club or Organization Meetings: Never     Marital Status:    Intimate Partner Violence: Not At Risk (4/23/2025)    Humiliation, Afraid, Rape, and Kick questionnaire     Fear of Current or Ex-Partner: No     Emotionally Abused: No     Physically Abused: No     Sexually Abused: No   Housing Stability: Low Risk  (4/24/2025)    Housing Stability Vital Sign      Unable to Pay for Housing in the Last Year: No     Number of Times Moved in the Last Year: 1     Homeless in the Last Year: No     Surgical History: Surgical History[2]  Family History: Family History[3]   reports that she has never smoked. She has never used smokeless tobacco.  Oncology Family history: Cancer-related family history is not on file.    Review Of Systems:  As stated per in HPI; otherwise all other 12 point ROS are negative    Physical Exam:  There were no vitals taken for this visit.  BSA: There is no height or weight on file to calculate BSA.  General: awake/alert/oriented x3, no distress, alert and cooperative  Head: Short hair fully covering scalp. Symmetric facial expressions  Eyes: PERRL, EOMI, clear sclera, eyebrows present.  Ears/Nose/Mouth/Throat:  Oral mucous membranes moist. No oral ulcers. No palpable pre/post-auricular lymph nodes  Neck: No palpable cervical chain lymph nodes  Respiratory: unlabored breathing on room air, good chest expansion, thorax symmetric  Cardio: Regular rate and rhythm, normal S1 and S2, radial pulses symmetric  GI: Nondistended, soft, non-tender abdomen  Musculoskeletal: Normal muscle bulk and tone, ROM intact, no joint swelling.  Rises from chair and walks unassisted.  Extremities: No ankle swelling, no arm or leg wounds  Neuro: Alert, cognition intact, speech normal. Facial expressions symmetric.  No motor deficits noted. Sensation intact to touch and hot/cold.   Able to stand from seated position unassisted and walks around the room unassisted.  Psychological: Appropriate mood and behavior.  Skin: Warm and dry, no lesions, no rashes    Results:  Diagnostic Results   Lab Results   Component Value Date    WBC 6.6 05/27/2025    HGB 13.4 05/27/2025    HCT 40.0 05/27/2025    MCV 91 05/27/2025     05/27/2025     Lab Results   Component Value Date    CALCIUM 9.6 05/27/2025     05/27/2025    K 3.8 05/27/2025    CO2 24 05/27/2025     05/27/2025    BUN  15 05/27/2025    CREATININE 0.61 05/27/2025    ALT 14 05/27/2025    AST 20 05/27/2025     Current Medications[4]     Assessment/Plan:  ? SVT:    Provoked by cellulitis. She has been on Eliquis 5 mg po BID for the last 5 weeks. Repeat Duplex confirms resolution of SVT, so  she can stop AC. No need for thrombophilia testing.    No need for further hematology FU    Diagnoses and all orders for this visit:  Superficial thrombosis of right lower extremity  -     Clinic Appointment Request Virtual Est       Performance Status: Asymptomatic    I spent more than 60 minutes for the patient today, including face-to-face conversation, pre-visit preparation, post-visit orders, and others.   Edith Juarez MD                                [1] No past medical history on file.  [2]   Past Surgical History:  Procedure Laterality Date    ASD REPAIR     [3] No family history on file.  [4]   Current Outpatient Medications:     apixaban (Eliquis) 5 mg tablet, Take 1 tablet (5 mg) by mouth 2 times a day., Disp: 90 tablet, Rfl: 0    emtricitabine-tenofovir, TDF, (Truvada) 200-300 mg tablet, Take 1 tablet by mouth once daily., Disp: 90 tablet, Rfl: 0    sertraline (Zoloft) 50 mg tablet, TAKE 1 TABLET DAILY AS DIRECTED, Disp: 90 tablet, Rfl: 0

## 2025-07-17 DIAGNOSIS — Z20.6 EXPOSURE TO HIV: ICD-10-CM

## 2025-07-17 RX ORDER — EMTRICITABINE AND TENOFOVIR DISOPROXIL FUMARATE 200; 300 MG/1; MG/1
1 TABLET, FILM COATED ORAL DAILY
Qty: 90 TABLET | Refills: 0 | Status: SHIPPED | OUTPATIENT
Start: 2025-07-17

## 2025-07-18 ENCOUNTER — APPOINTMENT (OUTPATIENT)
Dept: HEMATOLOGY/ONCOLOGY | Facility: CLINIC | Age: 35
End: 2025-07-18
Payer: COMMERCIAL

## 2025-08-01 ENCOUNTER — APPOINTMENT (OUTPATIENT)
Dept: HEMATOLOGY/ONCOLOGY | Facility: HOSPITAL | Age: 35
End: 2025-08-01
Payer: COMMERCIAL

## 2025-08-05 DIAGNOSIS — Z83.49 FAMILY HISTORY OF THYROID DISEASE: Primary | ICD-10-CM

## 2025-08-07 LAB
THYROPEROXIDASE AB SERPL-ACNC: 1 IU/ML
TSH SERPL-ACNC: 1.08 MIU/L

## 2025-08-29 DIAGNOSIS — F32.9 REACTIVE DEPRESSION: ICD-10-CM

## 2025-08-29 RX ORDER — SERTRALINE HYDROCHLORIDE 50 MG/1
50 TABLET, FILM COATED ORAL DAILY
Qty: 90 TABLET | Refills: 3 | Status: SHIPPED | OUTPATIENT
Start: 2025-08-29

## 2025-08-30 ENCOUNTER — HOSPITAL ENCOUNTER (EMERGENCY)
Facility: HOSPITAL | Age: 35
Discharge: HOME | End: 2025-08-30
Attending: EMERGENCY MEDICINE
Payer: COMMERCIAL

## 2025-08-30 ENCOUNTER — APPOINTMENT (OUTPATIENT)
Dept: CARDIOLOGY | Facility: HOSPITAL | Age: 35
End: 2025-08-30
Payer: COMMERCIAL

## 2025-08-30 ASSESSMENT — PAIN DESCRIPTION - DESCRIPTORS: DESCRIPTORS: DISCOMFORT

## 2025-08-30 ASSESSMENT — PAIN - FUNCTIONAL ASSESSMENT: PAIN_FUNCTIONAL_ASSESSMENT: 0-10

## 2025-08-30 ASSESSMENT — PAIN SCALES - GENERAL
PAINLEVEL_OUTOF10: 2
PAINLEVEL_OUTOF10: 0 - NO PAIN

## 2025-09-16 ENCOUNTER — APPOINTMENT (OUTPATIENT)
Dept: CARDIOLOGY | Facility: CLINIC | Age: 35
End: 2025-09-16
Payer: COMMERCIAL